# Patient Record
Sex: FEMALE | Race: WHITE | NOT HISPANIC OR LATINO | URBAN - METROPOLITAN AREA
[De-identification: names, ages, dates, MRNs, and addresses within clinical notes are randomized per-mention and may not be internally consistent; named-entity substitution may affect disease eponyms.]

---

## 2017-10-03 ENCOUNTER — EMERGENCY (EMERGENCY)
Facility: HOSPITAL | Age: 82
LOS: 0 days | Discharge: HOME | End: 2017-10-04

## 2017-10-03 DIAGNOSIS — Z79.899 OTHER LONG TERM (CURRENT) DRUG THERAPY: ICD-10-CM

## 2017-10-03 DIAGNOSIS — K59.00 CONSTIPATION, UNSPECIFIED: ICD-10-CM

## 2017-10-03 DIAGNOSIS — I10 ESSENTIAL (PRIMARY) HYPERTENSION: ICD-10-CM

## 2017-10-03 DIAGNOSIS — A41.9 SEPSIS, UNSPECIFIED ORGANISM: ICD-10-CM

## 2017-10-03 DIAGNOSIS — Z96.649 PRESENCE OF UNSPECIFIED ARTIFICIAL HIP JOINT: ICD-10-CM

## 2017-10-03 DIAGNOSIS — Z98.890 OTHER SPECIFIED POSTPROCEDURAL STATES: ICD-10-CM

## 2017-10-04 ENCOUNTER — EMERGENCY (EMERGENCY)
Facility: HOSPITAL | Age: 82
LOS: 0 days | Discharge: HOME | End: 2017-10-05

## 2017-10-04 DIAGNOSIS — A41.9 SEPSIS, UNSPECIFIED ORGANISM: ICD-10-CM

## 2017-10-04 DIAGNOSIS — R10.32 LEFT LOWER QUADRANT PAIN: ICD-10-CM

## 2017-10-04 DIAGNOSIS — I10 ESSENTIAL (PRIMARY) HYPERTENSION: ICD-10-CM

## 2017-10-04 DIAGNOSIS — K59.00 CONSTIPATION, UNSPECIFIED: ICD-10-CM

## 2017-10-04 DIAGNOSIS — Z79.899 OTHER LONG TERM (CURRENT) DRUG THERAPY: ICD-10-CM

## 2018-08-02 ENCOUNTER — APPOINTMENT (OUTPATIENT)
Dept: BURN CARE | Facility: CLINIC | Age: 83
End: 2018-08-02

## 2018-08-02 PROBLEM — Z00.00 ENCOUNTER FOR PREVENTIVE HEALTH EXAMINATION: Status: ACTIVE | Noted: 2018-08-02

## 2018-10-18 ENCOUNTER — APPOINTMENT (OUTPATIENT)
Dept: BURN CARE | Facility: CLINIC | Age: 83
End: 2018-10-18

## 2018-10-18 ENCOUNTER — OUTPATIENT (OUTPATIENT)
Dept: OUTPATIENT SERVICES | Facility: HOSPITAL | Age: 83
LOS: 1 days | Discharge: HOME | End: 2018-10-18

## 2018-10-18 DIAGNOSIS — S81.802A UNSPECIFIED OPEN WOUND, LEFT LOWER LEG, INITIAL ENCOUNTER: ICD-10-CM

## 2018-10-18 DIAGNOSIS — I10 ESSENTIAL (PRIMARY) HYPERTENSION: ICD-10-CM
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
## 2018-10-24 DIAGNOSIS — S81.802A UNSPECIFIED OPEN WOUND, LEFT LOWER LEG, INITIAL ENCOUNTER: ICD-10-CM
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
## 2018-11-01 ENCOUNTER — APPOINTMENT (OUTPATIENT)
Dept: WOUND CARE | Facility: CLINIC | Age: 83
End: 2018-11-01

## 2019-03-05 ENCOUNTER — INPATIENT (INPATIENT)
Facility: HOSPITAL | Age: 84
LOS: 8 days | Discharge: HOME HEALTH | End: 2019-03-14
Attending: INTERNAL MEDICINE | Admitting: INTERNAL MEDICINE
Payer: MEDICARE

## 2019-03-05 VITALS
RESPIRATION RATE: 20 BRPM | SYSTOLIC BLOOD PRESSURE: 177 MMHG | HEART RATE: 69 BPM | TEMPERATURE: 98 F | OXYGEN SATURATION: 98 % | DIASTOLIC BLOOD PRESSURE: 85 MMHG

## 2019-03-05 DIAGNOSIS — R09.89 OTHER SPECIFIED SYMPTOMS AND SIGNS INVOLVING THE CIRCULATORY AND RESPIRATORY SYSTEMS: ICD-10-CM

## 2019-03-05 LAB
ALBUMIN SERPL ELPH-MCNC: 3.9 G/DL — SIGNIFICANT CHANGE UP (ref 3.5–5.2)
ALP SERPL-CCNC: 116 U/L — HIGH (ref 30–115)
ALT FLD-CCNC: 8 U/L — SIGNIFICANT CHANGE UP (ref 0–41)
ANION GAP SERPL CALC-SCNC: 10 MMOL/L — SIGNIFICANT CHANGE UP (ref 7–14)
AST SERPL-CCNC: 18 U/L — SIGNIFICANT CHANGE UP (ref 0–41)
BASOPHILS # BLD AUTO: 0.02 K/UL — SIGNIFICANT CHANGE UP (ref 0–0.2)
BASOPHILS NFR BLD AUTO: 0.3 % — SIGNIFICANT CHANGE UP (ref 0–1)
BILIRUB SERPL-MCNC: 0.5 MG/DL — SIGNIFICANT CHANGE UP (ref 0.2–1.2)
BUN SERPL-MCNC: 11 MG/DL — SIGNIFICANT CHANGE UP (ref 10–20)
CALCIUM SERPL-MCNC: 9 MG/DL — SIGNIFICANT CHANGE UP (ref 8.5–10.1)
CHLORIDE SERPL-SCNC: 102 MMOL/L — SIGNIFICANT CHANGE UP (ref 98–110)
CO2 SERPL-SCNC: 26 MMOL/L — SIGNIFICANT CHANGE UP (ref 17–32)
CREAT SERPL-MCNC: 0.6 MG/DL — LOW (ref 0.7–1.5)
EOSINOPHIL # BLD AUTO: 0.16 K/UL — SIGNIFICANT CHANGE UP (ref 0–0.7)
EOSINOPHIL NFR BLD AUTO: 2.4 % — SIGNIFICANT CHANGE UP (ref 0–8)
GLUCOSE SERPL-MCNC: 91 MG/DL — SIGNIFICANT CHANGE UP (ref 70–99)
HCT VFR BLD CALC: 37.8 % — SIGNIFICANT CHANGE UP (ref 37–47)
HGB BLD-MCNC: 12.4 G/DL — SIGNIFICANT CHANGE UP (ref 12–16)
IMM GRANULOCYTES NFR BLD AUTO: 0.1 % — SIGNIFICANT CHANGE UP (ref 0.1–0.3)
LACTATE SERPL-SCNC: 1.1 MMOL/L — SIGNIFICANT CHANGE UP (ref 0.5–2.2)
LYMPHOCYTES # BLD AUTO: 1 K/UL — LOW (ref 1.2–3.4)
LYMPHOCYTES # BLD AUTO: 14.9 % — LOW (ref 20.5–51.1)
MCHC RBC-ENTMCNC: 30.4 PG — SIGNIFICANT CHANGE UP (ref 27–31)
MCHC RBC-ENTMCNC: 32.8 G/DL — SIGNIFICANT CHANGE UP (ref 32–37)
MCV RBC AUTO: 92.6 FL — SIGNIFICANT CHANGE UP (ref 81–99)
MONOCYTES # BLD AUTO: 0.78 K/UL — HIGH (ref 0.1–0.6)
MONOCYTES NFR BLD AUTO: 11.6 % — HIGH (ref 1.7–9.3)
NEUTROPHILS # BLD AUTO: 4.75 K/UL — SIGNIFICANT CHANGE UP (ref 1.4–6.5)
NEUTROPHILS NFR BLD AUTO: 70.7 % — SIGNIFICANT CHANGE UP (ref 42.2–75.2)
NRBC # BLD: 0 /100 WBCS — SIGNIFICANT CHANGE UP (ref 0–0)
PLATELET # BLD AUTO: 317 K/UL — SIGNIFICANT CHANGE UP (ref 130–400)
POTASSIUM SERPL-MCNC: 5 MMOL/L — SIGNIFICANT CHANGE UP (ref 3.5–5)
POTASSIUM SERPL-SCNC: 5 MMOL/L — SIGNIFICANT CHANGE UP (ref 3.5–5)
PROT SERPL-MCNC: 6.8 G/DL — SIGNIFICANT CHANGE UP (ref 6–8)
RBC # BLD: 4.08 M/UL — LOW (ref 4.2–5.4)
RBC # FLD: 14 % — SIGNIFICANT CHANGE UP (ref 11.5–14.5)
SODIUM SERPL-SCNC: 138 MMOL/L — SIGNIFICANT CHANGE UP (ref 135–146)
WBC # BLD: 6.72 K/UL — SIGNIFICANT CHANGE UP (ref 4.8–10.8)
WBC # FLD AUTO: 6.72 K/UL — SIGNIFICANT CHANGE UP (ref 4.8–10.8)

## 2019-03-05 PROCEDURE — 93970 EXTREMITY STUDY: CPT | Mod: 26

## 2019-03-05 PROCEDURE — 93925 LOWER EXTREMITY STUDY: CPT | Mod: 26

## 2019-03-05 RX ORDER — COLLAGENASE CLOSTRIDIUM HIST. 250 UNIT/G
1 OINTMENT (GRAM) TOPICAL
Qty: 0 | Refills: 0 | Status: DISCONTINUED | OUTPATIENT
Start: 2019-03-05 | End: 2019-03-12

## 2019-03-05 RX ORDER — LOSARTAN POTASSIUM 100 MG/1
25 TABLET, FILM COATED ORAL DAILY
Qty: 0 | Refills: 0 | Status: DISCONTINUED | OUTPATIENT
Start: 2019-03-05 | End: 2019-03-12

## 2019-03-05 RX ORDER — CHLORHEXIDINE GLUCONATE 213 G/1000ML
1 SOLUTION TOPICAL
Qty: 0 | Refills: 0 | Status: DISCONTINUED | OUTPATIENT
Start: 2019-03-05 | End: 2019-03-12

## 2019-03-05 RX ORDER — AMPICILLIN SODIUM AND SULBACTAM SODIUM 250; 125 MG/ML; MG/ML
3 INJECTION, POWDER, FOR SUSPENSION INTRAMUSCULAR; INTRAVENOUS ONCE
Qty: 0 | Refills: 0 | Status: DISCONTINUED | OUTPATIENT
Start: 2019-03-05 | End: 2019-03-05

## 2019-03-05 RX ORDER — FENTANYL CITRATE 50 UG/ML
1 INJECTION INTRAVENOUS
Qty: 0 | Refills: 0 | COMMUNITY

## 2019-03-05 RX ORDER — HEPARIN SODIUM 5000 [USP'U]/ML
5000 INJECTION INTRAVENOUS; SUBCUTANEOUS EVERY 8 HOURS
Qty: 0 | Refills: 0 | Status: DISCONTINUED | OUTPATIENT
Start: 2019-03-05 | End: 2019-03-12

## 2019-03-05 RX ORDER — VANCOMYCIN HCL 1 G
1000 VIAL (EA) INTRAVENOUS ONCE
Qty: 0 | Refills: 0 | Status: COMPLETED | OUTPATIENT
Start: 2019-03-05 | End: 2019-03-05

## 2019-03-05 RX ORDER — SODIUM CHLORIDE 9 MG/ML
1000 INJECTION INTRAMUSCULAR; INTRAVENOUS; SUBCUTANEOUS ONCE
Qty: 0 | Refills: 0 | Status: COMPLETED | OUTPATIENT
Start: 2019-03-05 | End: 2019-03-05

## 2019-03-05 RX ORDER — OLANZAPINE 15 MG/1
1 TABLET, FILM COATED ORAL
Qty: 0 | Refills: 0 | COMMUNITY

## 2019-03-05 RX ORDER — VALSARTAN 80 MG/1
1 TABLET ORAL
Qty: 0 | Refills: 0 | COMMUNITY

## 2019-03-05 RX ORDER — DIPHENHYDRAMINE HCL 50 MG
25 CAPSULE ORAL ONCE
Qty: 0 | Refills: 0 | Status: COMPLETED | OUTPATIENT
Start: 2019-03-05 | End: 2019-03-05

## 2019-03-05 RX ADMIN — SODIUM CHLORIDE 2000 MILLILITER(S): 9 INJECTION INTRAMUSCULAR; INTRAVENOUS; SUBCUTANEOUS at 15:08

## 2019-03-05 RX ADMIN — Medication 25 MILLIGRAM(S): at 18:55

## 2019-03-05 RX ADMIN — Medication 1 APPLICATION(S): at 17:07

## 2019-03-05 RX ADMIN — Medication 250 MILLIGRAM(S): at 14:52

## 2019-03-05 RX ADMIN — Medication 1 TABLET(S): at 16:16

## 2019-03-05 RX ADMIN — Medication 100 MILLIGRAM(S): at 21:35

## 2019-03-05 RX ADMIN — HEPARIN SODIUM 5000 UNIT(S): 5000 INJECTION INTRAVENOUS; SUBCUTANEOUS at 21:35

## 2019-03-05 NOTE — CONSULT NOTE ADULT - ASSESSMENT
pain sub 5th metatarsal due to plantarflexed 5th metatarsal  with pretrophic lesion.  pt unable to walk due to the pain.

## 2019-03-05 NOTE — ED PROVIDER NOTE - OBJECTIVE STATEMENT
84 yo f with pmh of htn sent in by podiatry for admission for left foot cellulitis.  no trauma, no injuries, no numbness, no weakness, no fevers, no chills, no cp, no sob.  pt with pain in the foot for 3 days.  pt went to podiatrist who debreeided foot and sent her in for admission for iv abx.  pt also has chronic left lower leg open wounds/ulcerations that she changes the dressing on daily

## 2019-03-05 NOTE — PATIENT PROFILE ADULT - NSPROMUTINFOINDIVIDFT_GEN_A_NUR
Verified Results  TSH 26Sep2018 08:15DEBORAH THEO STACIEFAVIONITO   [Sep 27, 2018 8:13AM THEO STACIEFAVIONITO]  Labs are okay     Test Name Result Flag Reference   TSH 2.073 mcUnits/mL  0.350-5.000     CBC WITHOUT DIFFERENTIAL 26Sep2018 08:15DEBORAH VENEGAS STACIERBAX   [Sep 27, 2018 8:13DEBORAH THEO STACIERBAX]  Labs are okay     Test Name Result Flag Reference   RDW-CV 13.3 %  11.0-15.0   PLATELET COUNT 219 K/mcL  140-450   WHITE BLOOD COUNT 9.8 K/mcL  4.2-11.0   RED CELL COUNT 4.35 mil/mcL L 4.50-5.90   HEMOGLOBIN 13.2 g/dl  13.0-17.0   HEMATOCRIT 41.1 %  39.0-51.0   MEAN CORPUSCULAR VOLUME 94.5 fL  78.0-100.0   MEAN CORPUSCULAR HEMOGLOBIN 30.3 pg  26.0-34.0   MEAN CORPUSCULAR HGB CONC 32.1 g/dl  32.0-36.5   NRBC 0 /100 WBC  0     COMP METABOLIC PNL 26Sep2018 08:15DEBORAH THEO [place]   [Sep 27, 2018 8:13AM THEO STACIEFAVIONITO]  Labs are okay     Test Name Result Flag Reference   SODIUM 144 mmol/L  135-145   POTASSIUM 4.3 mmol/L  3.4-5.1   CHLORIDE 111 mmol/L H    CARBON DIOXIDE 25 mmol/L  21-32   ANION GAP 12 mmol/L  10-20   GLUCOSE 103 mg/dl H 65-99   BUN 21 mg/dl H 6-20   CREATININE 1.16 mg/dl  0.67-1.17   GFR EST.AFRICAN AMER 75     eGFR 60 - 89 mL/min/1.73m2 = Mild decrease in kidney function.   GFR EST.NONAFRI AMER 65     eGFR 60 - 89 mL/min/1.73m2 = Mild decrease in kidney function.   BUN/CREATININE RATIO 18  7-25   BILIRUBIN TOTAL 0.3 mg/dl  0.2-1.0   GOT/AST 24 Units/L  <38   ALKALINE PHOSPHATASE 78 Units/L     ALBUMIN 4.2 g/dl  3.6-5.1   TOTAL PROTEIN 7.9 g/dl  6.4-8.2   GLOBULIN (CALCULATED) 3.7 g/dl  2.0-4.0   A/G RATIO 1.1  1.0-2.4   CALCIUM 9.1 mg/dl  8.4-10.2   GPT/ALT 37 Units/L  <79   FASTING STATUS 12 HRS hrs       LIPID PNL [id number] 08:15AM [place] VENEGAS   [Sep 27, 2018 8:13AM [place] VENEGAS]  Labs are okay     Test Name Result Flag Reference   FASTING STATUS FASTING hrs     CHOLESTEROL 180 mg/dl  <200   Desirable            <200  Borderline High      200 to 239  High                 >=240   HDL CHOLESTEROL 37 mg/dl L >39   Low             <40  Borderline Low 40 to 49  Near Optimal   50 to 59  Optimal        >=60   TRIGLYCERIDES 82 mg/dl  <150   Normal                   <150  Borderline High          150 to 199  High                     200 to 499  Very High                >=500   LDL CHOLESTEROL (CALCULATED) 127 mg/dl  <130   OPTIMAL               <100  NEAR OPTIMAL          100-129  BORDERLINE HIGH       130-159  HIGH                  160-189  VERY HIGH             >=190   NON-HDL CHOLESTEROL 143 mg/dl     Therapeutic Target:  CHD and risk equivalents <130  Multiple risk factors    <160  0 to 1 risk factors      <190   CHOLESTEROL/HDL RATIO 4.9 H <4.5        none

## 2019-03-05 NOTE — ED PROVIDER NOTE - NS ED ROS FT
Constitutional:  no fevers, no chills, no malaise  ENMT: No neck pain or stiffness  Cardiac:  No chest pain  Respiratory:  No cough or sob  GI:  No nausea, vomiting, diarrhea or abdominal pain.  MS:  see hpi  Neuro:  No headache, no dizziness  Skin:  see hpi  Except as documented in the HPI,  all other systems are negative

## 2019-03-05 NOTE — H&P ADULT - ATTENDING COMMENTS
Agree with resident's note, HPI, PE, assessment and plan.  Pt was seen and examined independently.     86 yo F with MHx of HTN, chronic venous stasis and Kaposi sarcome, has been on chemo (?Taxol) [id number], last dose 2 weeks ago, treatment at [facility] with dr. Friedman, sent to Ed by podiatrist, who she sees rare, with suspision for cellulitis. Pt states for last couple of days the pain in LLE gotten worse that prompted visit to podiatrist. Pt denies fever, chills, no discharge from wound, no worsening of swelling.     CONSTITUTIONAL: No weakness, fevers or chills  EYES/ENT: No visual changes;  No vertigo or throat pain   NECK: No pain or stiffness  RESPIRATORY: No cough, wheezing, hemoptysis; No shortness of breath  CARDIOVASCULAR: No chest pain or palpitations  GASTROINTESTINAL: No abdominal or epigastric pain. No nausea, vomiting, or hematemesis; No diarrhea or constipation. No melena or hematochezia.  GENITOURINARY: No dysuria, frequency or hematuria  NEUROLOGICAL: No numbness or weakness  SKIN: No itching, rashes     T(C): 36.2  HR: 65  BP: 158/69  RR: 18  SpO2: 98%      Physical exam:  NAD, well developed, well nourished, decreased hearing  HEENT: PERRL  NECK: supple, no JVD  RESP: CTA b/l, no crackles, rhonchi  CVS: S1S2, RRR  GI: abdomen soft NT, ND  Extremities: b/l LE swelling, LE chronic venous stasis skin changes, LLE wounds with no drainage, no new erythema, no warmth.  NEURO: AOx3, no focal deficit  H/L: no enlarged LN noted     LABS:                       11.0   4.31  )-----------( 268      ( 06 Mar 2019 06:38 )             33.8     03-06    137  |  101  |  10  ----------------------------<  113<H>  3.9   |  25  |  0.6<L>    Ca    8.5      06 Mar 2019 06:38  Mg     2.1     03-06    TPro  6.8  /  Alb  3.9  /  TBili  0.5  /  DBili  x   /  AST  18  /  ALT  8   /  AlkPhos  116<H>  03-05    US art: Bilateral peripheral arterial occlusive disease. Moderate to high-grade   stenosis in the left superficial femoral artery.    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 No cellulitis  - pain control with Fentanyl patch, pt not very compliant with it as per son  - pt does not want to continue treatment at [facility], can refer her to our heme/onc clinic  - no ABx    # High grade stenosis in the left superficial femoral artery.  - vascular eval    # HTN  - cont home meds    # Chronic anemia - OP w/u and treatment with PMD  DVT ppx    Pt can be discharged after vascular evaluation with OP f/u with oncologist.

## 2019-03-05 NOTE — H&P ADULT - NSHPLABSRESULTS_GEN_ALL_CORE
12.4   6.72  )-----------( 317      ( 05 Mar 2019 14:20 )             37.8     03-05  138  |  102  |  11  ----------------------------<  91  5.0   |  26  |  0.6<L>  Ca    9.0      05 Mar 2019 14:20  TPro  6.8  /  Alb  3.9  /  TBili  0.5  /  DBili  x   /  AST  18  /  ALT  8   /  AlkPhos  116<H>  03-05    Lactate Trend  03-05 @ 14:20 Lactate:1.1

## 2019-03-05 NOTE — ED PROVIDER NOTE - PHYSICAL EXAMINATION
CONSTITUTIONAL: Well-developed; well-nourished; in no acute distress, nontoxic appearing  SKIN: Chronic left lower leg open wounds/ulcerations with erythema, no discharge.  HEAD: Normocephalic; atraumatic.  EYES:  conjunctiva and sclera clear.  ENT: MMM, no nasal congestion  NECK: Supple; non tender.  ROM intact.  CARD: S1, S2 normal, no murmur  RESP: no resp distress, pt breathing comfortably  ABD: soft; non-distended; non-tender. No Rebound, No guarding  EXT: LLE: with erythema to sole of foot with localized area of worsening erytrhema, no discharge, + tenderness, no bleeding, motor/sensaiton intact; chornic open ulcerations to lower leg.  2+ dp pulse, no crepitus  NEURO: awake, alert, following commands, oriented, grossly unremarkable. No Focal deficits. GCS 15.   PSYCH: Cooperative, appropriate.

## 2019-03-05 NOTE — H&P ADULT - NSHPSOCIALHISTORY_GEN_ALL_CORE
Tobacco Usage:  Alcohol Usage:  Illicit Drug Usage: Tobacco Usage: Denies  Alcohol Usage: Denies  Illicit Drug Usage: Denies

## 2019-03-05 NOTE — CONSULT NOTE ADULT - SUBJECTIVE AND OBJECTIVE BOX
PODIATRY CONSULT   KALI ANDERS is a pleasant well-nourished, well developed 85y Female in no acute distress, alert awake, and oriented to person, place and time.   Patient is a 85y old  Female who presents with a chief complaint of Left foot pain and chronic wounds LLE.  HPI:  84 yo f with pmh of htn sent in by podiatry for admission for left foot cellulitis.  no trauma, no injuries, no numbness, no weakness, no fevers, no chills, nocp, no sob.  pt with pain in the foot for 3 days.  pt went to podiatrist who debreeided foot and sent her in for admission for iv abx.  pt also has chronic left lower leg open wounds/ulcerations that she changes the dressing on daily  Skin ulcer of left ankle, limited to breakdown of skin  Leg swelling  HTN (hypertension)      PMH: Skin ulcer of left ankle, limited to breakdown of skin  Leg swelling  HTN (hypertension)    PSH:   Medication ampicillin/sulbactam  IVPB 3 Gram(s) IV Intermittent once  vancomycin  IVPB 1000 milliGRAM(s) IV Intermittent once    Allergy: No Known Allergies        Labs:                    O:   Derm: Ulceration Right/Left ---- in nature, +/- hyperkeratotic border, wound base Granular/Fibrogranular/Necrotic patches/ , wound size (-- cm X – cm X –cm) +/- edema, +/- chuy-wound erythema, +/- purulence, +/- fluctuance, +/- tracking/tunneling, +/- probe to bone. +/- streaking erythema. +/- xerosis, +/- hemosiderin deposits. +/- active sign of infection  Vascular: Dorsalis Pedis and Posterior Tibial pulses --/4.  Capillary re-fill time less then 3 seconds digits 1-5 bilateral.  B/L feet warm to touch  Neuro: Protective sensation intact/diminished to the level of the digits bilateral.  MSK: Muscle strength 5/5 all major muscle groups bilateral.        Assessment and Plan:   Pt with…………………..  Chart reviewed and Patient evaluated  Discussed diagnosis and treatment with patient  Wound flush with normal saline  Applied --- with dry sterile dressing  Offloading to bilateral Heels.   Obtained wound culture to be sent to Pathology  X-rays ordered/reviewed : Shows -------  Recommend ID/Vascular consult  Continue IV abx as per ID  Arterial duplex ordered  Weight bearing/Non-weight bearing  to ------------  Discussed importance of daily foot examinations and proper shoe gear and to importance of lower Fasting Blood Glucose levels.   Podiatry will follow while in house.   will discuss care plan  with all  Attendings ------  Pt to OR for Excisional debridement  on non viable soft  tissue  ---- base down to subcutaneous tissue red granular base  Right/ Left foot ulceration  Medical clearance requested PODIATRY CONSULT   KALI ANDERS is  a 85y old  Female who presents with a chief complaint of Left foot pain and chronic wounds LLE.  HPI:  84 yo f with pmh of htn sent in by podiatry for admission for left foot cellulitis.  no trauma, no injuries, no numbness, no weakness, no fevers, no chills, nocp, no sob.  pt with pain in the foot for 3 days.  pt went to podiatrist who debreeided foot and sent her in for admission for iv abx.  pt also has chronic left lower leg open wounds/ulcerations that she changes the dressing on daily  Skin ulcer of left ankle, limited to breakdown of skin  Leg swelling  HTN (hypertension)      PMH: Skin ulcer of left ankle, limited to breakdown of skin  Leg swelling  HTN (hypertension)    PSH:   Medication ampicillin/sulbactam  IVPB 3 Gram(s) IV Intermittent once  vancomycin  IVPB 1000 milliGRAM(s) IV Intermittent once    Allergy: No Known Allergies        Labs:                    O: -   - Derm: Ulceration Left LE, medial lower leg, venus stasis in nature, wound base Fibrogranular, wound size (22.3 cm X 15.2 x 0.5 cm), sharp well demarcated with rolled borders. + edema, + chuy-wound erythema, - purulence, - probe to bone. - streaking erythema. + hemosiderin deposits.  -  Ulceration Left LE, lateral lower leg, venus stasis in nature, wound base Fibrogranular, wound size (3.8  cm X  2.8 x 0.5 cm), sharp well demarcated with rolled borders. + edema, + chuy-wound erythema, - purulence, - probe to bone. - streaking erythema. + hemosiderin deposits.  - Edema, erythema, and warmth of the LLE   Vascular: Dorsalis Pedis and Posterior Tibial pulses diminished.  Capillary re-fill time less then 3 seconds digits 1-5 bilateral.  B/L feet warm to touch  Neuro: Protective sensation intact to the level of the digits bilateral.  MSK: Muscle strength 5/5 all major muscle groups bilateral.         Assessment:   Venus stasis ulcer LLE  P:  Chart reviewed and Patient evaluated  Discussed diagnosis and treatment with patient  Wound flush with normal saline/peroxide  Applied Xeroform with dry sterile dressing and ACE bandage with compression to pt tolerance   Recommend ID consult   will discuss care plan  with  Attending PODIATRY CONSULT   KALI ANDERS is  a 85y old  Female who presents with a chief complaint of Left foot pain and chronic wounds LLE.  HPI:  86 yo f with pmh of htn sent in by podiatry for admission for left foot cellulitis.  no trauma, no injuries, no numbness, no weakness, no fevers, no chills, nocp, no sob.  pt with pain in the foot for 3 days.  pt went to podiatrist who debreeided foot and sent her in for admission for iv abx.  pt also has chronic left lower leg open wounds/ulcerations that she changes the dressing on daily  Skin ulcer of left ankle, limited to breakdown of skin  Leg swelling  HTN (hypertension)      PMH: Skin ulcer of left ankle, limited to breakdown of skin  Leg swelling  HTN (hypertension)    PSH:   Medication ampicillin/sulbactam  IVPB 3 Gram(s) IV Intermittent once  vancomycin  IVPB 1000 milliGRAM(s) IV Intermittent once    Allergy: No Known Allergies        Labs:                    O: -   - Derm: Ulceration Left LE, medial lower leg, venus stasis in nature, wound base Fibrogranular, wound size (22.3 cm X 15.2 x 0.5 cm), sharp well demarcated with rolled borders. + edema, + chuy-wound erythema, - purulence, - probe to bone. - streaking erythema. + hemosiderin deposits.  -  Ulceration Left LE, lateral lower leg, venus stasis in nature, wound base Fibrogranular, wound size (3.8  cm X  2.8 x 0.5 cm), sharp well demarcated with rolled borders. + edema, + chuy-wound erythema, - purulence, - probe to bone. - streaking erythema. + hemosiderin deposits.  - Edema, erythema, and warmth of the LLE   Vascular: Dorsalis Pedis and Posterior Tibial pulses diminished.  Capillary re-fill time less then 3 seconds digits 1-5 bilateral.  B/L feet warm to touch  Neuro: Protective sensation intact to the level of the digits bilateral.  MSK: Muscle strength 5/5 all major muscle groups bilateral.   pain on palpation left 5th met head.      Assessment:   Venus stasis ulcer LLE  cellulitis LLE  left 5th met head pain  P:  Chart reviewed and Patient evaluated  Discussed diagnosis and treatment with patient  Wound flush with normal saline/peroxide  Applied Xeroform with dry sterile dressing and ACE bandage with compression to pt tolerance   Recommend ID consult  ordered left foot xray  ordered arterial and venous duplex, consult vascular if abnormal  will discuss care plan  with  Attending PODIATRY CONSULT   KALI ANDERS is  a 85y old  Female who presents with a chief complaint of Left foot pain and chronic wounds LLE.  HPI:  86 yo f with pmh of htn sent in by podiatry for admission for left foot cellulitis.  no trauma, no injuries, no numbness, no weakness, no fevers, no chills, nocp, no sob.  pt with pain in the foot for 3 days.  pt went to podiatrist who debreeided foot and sent her in for admission for iv abx.  pt also has chronic left lower leg open wounds/ulcerations that she changes the dressing on daily  Skin ulcer of left ankle, limited to breakdown of skin  Leg swelling  HTN (hypertension)      PMH: Skin ulcer of left ankle, limited to breakdown of skin  Leg swelling  HTN (hypertension)    PSH:   Medication ampicillin/sulbactam  IVPB 3 Gram(s) IV Intermittent once  vancomycin  IVPB 1000 milliGRAM(s) IV Intermittent once    Allergy: No Known Allergies        Labs:                    O: -   - Derm: Ulceration Left LE, medial lower leg, venus stasis in nature, wound base Fibrogranular, wound size (22.3 cm X 15.2 x 0.5 cm), sharp well demarcated with rolled borders. + edema, + chuy-wound erythema, - purulence, - probe to bone. - streaking erythema. + hemosiderin deposits.  -  Ulceration Left LE, lateral lower leg, venus stasis in nature, wound base Fibrogranular, wound size (3.8  cm X  2.8 x 0.5 cm), sharp well demarcated with rolled borders. + edema, + chuy-wound erythema, - purulence, - probe to bone. - streaking erythema. + hemosiderin deposits.  - Ulcer left sub met 5, limited to the dermis. 0.3x0.2x0.1cm size, stable.   - Edema, erythema, and warmth of the LLE   Vascular: Dorsalis Pedis and Posterior Tibial pulses diminished.  Capillary re-fill time less then 3 seconds digits 1-5 bilateral.  B/L feet warm to touch  Neuro: Protective sensation intact to the level of the digits bilateral.  MSK: Muscle strength 5/5 all major muscle groups bilateral.   pain on palpation left 5th met head.      Assessment:   Venus stasis ulcer LLE  cellulitis LLE  Small left 5th sub met ulcer, limited to dermis, stable  P:  Chart reviewed and Patient evaluated  Discussed diagnosis and treatment with patient  Wound flush with normal saline/peroxide  Applied Xeroform with dry sterile dressing and ACE bandage with compression to pt tolerance   Recommend ID consult  IV ABx as per ID recommendations  ordered left foot xray  ordered arterial and venous duplex, consult vascular if abnormal  will discuss care plan  with  Attending

## 2019-03-05 NOTE — ED ADULT NURSE NOTE - OBJECTIVE STATEMENT
pt with c/o left foot lac, left leg swelling and pressure ulcer ( stage 2 in appearance, yellow and scaley in appearance)

## 2019-03-05 NOTE — H&P ADULT - ASSESSMENT
86 yo F  PMH: HTN  cc: sent in by podiatrist for the evaluation of cellulitis.   History: Patient has been having right foot pain x 3 days. Sent to the hospital by her podiatrist for IV antibiotics.   In ED:   Medications ordered: Patient was given Augmentin 875/125mg x 1 dose, Vancomycin 1g IV x 1 dose, NS 1 L bolus. Santyl ordered  Imaging studies ordered: Venous Duplex, Arterial Duplex, X-ray of foot AP/Lateral/Olique    ASSESSMENT:  1.	HTN  2.	Left Lower Extremity Cellulitis with Venous Stasis Ulcer    PLAN:  ·	Admit to medicine  ·	Podiatry following  ·	Dressings changed - Continue Santyl  ·	Consider ID consult  ·	Left Foot X-ray completed, f/u read  ·	Venous Duplex performed, order pending  ·	Arterial Duplex for PAD evaluation pending. If abnormal will need vascular consult.  ·	Continue home medications  ·	CHG daily and prn for incontinence  ·	Carbohydrate consistent / DASH diet  ·	DVT ppx with Heparin sc  ·	Full Code 86 yo F  PMH: HTN  cc: sent in by podiatrist for the evaluation of cellulitis.   History: Patient has been having right foot pain x 3 days. Sent to the hospital by her podiatrist for IV antibiotics.   In ED:   Medications ordered: Patient was given Augmentin 875/125mg x 1 dose, Vancomycin 1g IV x 1 dose, NS 1 L bolus. Santyl ordered  Imaging studies ordered: Venous Duplex, Arterial Duplex, X-ray of foot AP/Lateral/Olique    ASSESSMENT:  1.	HTN  2.	Left Lower Extremity Cellulitis with Venous Stasis Ulcer    PLAN:  ·	Admit to medicine  ·	Podiatry following  ·	Dressings changed - Continue Santyl  ·	ID consulted, f/u recommendations  ·	Blood Cultures x 2 pending  ·	Continue IV antibiotics: Clindamycin 600mg  IV q8h  ·	Left Foot X-ray completed, f/u read  ·	Venous Duplex performed, order pending  ·	Arterial Duplex for PAD evaluation pending. If abnormal will need vascular consult.  ·	Continue home medications  ·	CHG daily and prn for incontinence  ·	Carbohydrate consistent / DASH diet  ·	DVT ppx with Heparin sc  ·	Full Code 86 yo F  PMH: HTN  cc: sent in by podiatrist for the evaluation of cellulitis.   History: Patient has been having right foot pain x 3 days. Sent to the hospital by her podiatrist for IV antibiotics.   In ED:   Medications ordered: Patient was given Augmentin 875/125mg x 1 dose, Vancomycin 1g IV x 1 dose, NS 1 L bolus. Santyl ordered  Imaging studies ordered: Venous Duplex, Arterial Duplex, X-ray of foot AP/Lateral/Olique    ASSESSMENT:  1.	HTN  2.	Left Lower Extremity Cellulitis with Venous Stasis Ulcer    PLAN:  ·	Admit to medicine  ·	Podiatry following  ·	Dressings changed - Continue Santyl  ·	ID consulted, f/u recommendations  ·	Blood Cultures x 2 pending  ·	Continue IV antibiotics: Clindamycin 600mg  IV q8h  ·	Left Foot X-ray completed, f/u read  ·	Venous Duplex performed, order pending  ·	Arterial Duplex for PAD evaluation pending. If abnormal will need vascular consult.  ·	Valsartan not available as formulary. Will begin Losartan 25mg PO once a day. Increase as needed.  ·	CHG daily and prn for incontinence  ·	Carbohydrate consistent / DASH diet  ·	DVT ppx with Heparin sc  ·	Full Code

## 2019-03-05 NOTE — H&P ADULT - HISTORY OF PRESENT ILLNESS
86 yo F  PMH: HTN  cc: sent in by podiatrist for the evaluation of cellulitis.   History: Patient has been having right foot pain x 3 days. She went to her podiatrist and had a debridement done. She was then instructed to come to the hospital for IV antibiotics.   ROS: 86 yo F  PMH: HTN  cc: sent in by podiatrist for the evaluation of cellulitis.   History: Patient states that history began three years ago. She was taking the trash outside while it was raining and fell down 15 steps injuring both her legs. The right lower extremity has healed well however the left lower extremity has not. She has been following with a podiatrist for chronic ulcers she has developed. Patient has been having right foot pain x 3 days. She said that last night the pain became very severe. She tried to take two Tylenol which usually helps to alleviate the pain however, this time it did not improve. She went to her podiatrist in AM and had a debridement done. She was then instructed to come to the hospital for IV antibiotics and to r/o cellulitis. Patient denies f/n/v/d/c, chills, headache, dizziness, chest pain, sob, abdominal pain, back pain. Admits to severe sharp pain when attempting to ambulate.

## 2019-03-05 NOTE — ED PROVIDER NOTE - PROGRESS NOTE DETAILS
a/p:  cellulitis.  pt sent in by podiatery for admission to medicine.  I spoke to podiatry who will come to see pt.  will do labs, xray, podiatry consult, abx, admission

## 2019-03-05 NOTE — ED ADULT NURSE NOTE - NSIMPLEMENTINTERV_GEN_ALL_ED
Implemented All Fall with Harm Risk Interventions:  [place] to call system. Call bell, personal items and telephone within reach. Instruct patient to call for assistance. Room bathroom lighting operational. Non-slip footwear when patient is off stretcher. Physically safe environment: no spills, clutter or unnecessary equipment. Stretcher in lowest position, wheels locked, appropriate side rails in place. Provide visual cue, wrist band, yellow gown, etc. Monitor gait and stability. Monitor for mental status changes and reorient to person, place, and time. Review medications for side effects contributing to fall risk. Reinforce activity limits and safety measures with patient and family. Provide visual clues: red socks.

## 2019-03-05 NOTE — ED PROVIDER NOTE - CLINICAL SUMMARY MEDICAL DECISION MAKING FREE TEXT BOX
Pt sent in by podiatrist for admission for cellulitis to the foot.  pt given iv abx.  xray done.  podiatry consulted.  pt admitted to medicine for further management.  pt aware of plan.

## 2019-03-05 NOTE — H&P ADULT - RS GEN PE MLT RESP DETAILS PC
respirations non-labored/no wheezes/no rhonchi/no rales/breath sounds equal/good air movement/airway patent

## 2019-03-06 ENCOUNTER — TRANSCRIPTION ENCOUNTER (OUTPATIENT)
Age: 84
End: 2019-03-06

## 2019-03-06 LAB
ANION GAP SERPL CALC-SCNC: 11 MMOL/L — SIGNIFICANT CHANGE UP (ref 7–14)
BUN SERPL-MCNC: 10 MG/DL — SIGNIFICANT CHANGE UP (ref 10–20)
CALCIUM SERPL-MCNC: 8.5 MG/DL — SIGNIFICANT CHANGE UP (ref 8.5–10.1)
CHLORIDE SERPL-SCNC: 101 MMOL/L — SIGNIFICANT CHANGE UP (ref 98–110)
CO2 SERPL-SCNC: 25 MMOL/L — SIGNIFICANT CHANGE UP (ref 17–32)
CREAT SERPL-MCNC: 0.6 MG/DL — LOW (ref 0.7–1.5)
CRP SERPL-MCNC: 5.22 MG/DL — HIGH (ref 0–0.4)
ERYTHROCYTE [SEDIMENTATION RATE] IN BLOOD: 57 MM/HR — HIGH (ref 0–20)
GLUCOSE SERPL-MCNC: 113 MG/DL — HIGH (ref 70–99)
HCT VFR BLD CALC: 33.8 % — LOW (ref 37–47)
HGB BLD-MCNC: 11 G/DL — LOW (ref 12–16)
MAGNESIUM SERPL-MCNC: 2.1 MG/DL — SIGNIFICANT CHANGE UP (ref 1.8–2.4)
MCHC RBC-ENTMCNC: 29.9 PG — SIGNIFICANT CHANGE UP (ref 27–31)
MCHC RBC-ENTMCNC: 32.5 G/DL — SIGNIFICANT CHANGE UP (ref 32–37)
MCV RBC AUTO: 91.8 FL — SIGNIFICANT CHANGE UP (ref 81–99)
NRBC # BLD: 0 /100 WBCS — SIGNIFICANT CHANGE UP (ref 0–0)
PLATELET # BLD AUTO: 268 K/UL — SIGNIFICANT CHANGE UP (ref 130–400)
POTASSIUM SERPL-MCNC: 3.9 MMOL/L — SIGNIFICANT CHANGE UP (ref 3.5–5)
POTASSIUM SERPL-SCNC: 3.9 MMOL/L — SIGNIFICANT CHANGE UP (ref 3.5–5)
RBC # BLD: 3.68 M/UL — LOW (ref 4.2–5.4)
RBC # FLD: 14 % — SIGNIFICANT CHANGE UP (ref 11.5–14.5)
SODIUM SERPL-SCNC: 137 MMOL/L — SIGNIFICANT CHANGE UP (ref 135–146)
WBC # BLD: 4.31 K/UL — LOW (ref 4.8–10.8)
WBC # FLD AUTO: 4.31 K/UL — LOW (ref 4.8–10.8)

## 2019-03-06 RX ORDER — FENTANYL CITRATE 50 UG/ML
1 INJECTION INTRAVENOUS
Qty: 0 | Refills: 0 | Status: DISCONTINUED | OUTPATIENT
Start: 2019-03-06 | End: 2019-03-06

## 2019-03-06 RX ORDER — FENTANYL CITRATE 50 UG/ML
1 INJECTION INTRAVENOUS
Qty: 0 | Refills: 0 | Status: DISCONTINUED | OUTPATIENT
Start: 2019-03-06 | End: 2019-03-12

## 2019-03-06 RX ADMIN — FENTANYL CITRATE 1 PATCH: 50 INJECTION INTRAVENOUS at 20:01

## 2019-03-06 RX ADMIN — HEPARIN SODIUM 5000 UNIT(S): 5000 INJECTION INTRAVENOUS; SUBCUTANEOUS at 22:03

## 2019-03-06 RX ADMIN — Medication 100 MILLIGRAM(S): at 05:08

## 2019-03-06 RX ADMIN — Medication 1 APPLICATION(S): at 05:09

## 2019-03-06 RX ADMIN — Medication 1 APPLICATION(S): at 18:07

## 2019-03-06 RX ADMIN — LOSARTAN POTASSIUM 25 MILLIGRAM(S): 100 TABLET, FILM COATED ORAL at 05:07

## 2019-03-06 RX ADMIN — FENTANYL CITRATE 1 PATCH: 50 INJECTION INTRAVENOUS at 18:07

## 2019-03-06 RX ADMIN — HEPARIN SODIUM 5000 UNIT(S): 5000 INJECTION INTRAVENOUS; SUBCUTANEOUS at 05:08

## 2019-03-06 RX ADMIN — HEPARIN SODIUM 5000 UNIT(S): 5000 INJECTION INTRAVENOUS; SUBCUTANEOUS at 13:25

## 2019-03-06 NOTE — CONSULT NOTE ADULT - ASSESSMENT
84 yo f with pmh of htn sent in by podiatry for admission for left foot cellulitis and non-healing open wound. Art dplx high grade stenosis SFA on the left side.  Vascular surgery called to evaluate and manage PVD     - pt will need an angiogram   - will d/w Dr. Crenshaw the timing  - May be as outpt    SPECTRA [id number] 84 yo f with pmh of htn sent in by podiatry for admission for left foot cellulitis and non-healing open wound. Art dplx high grade stenosis SFA on the left side.  Vascular surgery called to evaluate and manage PVD     - pt will need an angiogram     - LLE angiogram is scheduled for Tuesday     - case discussed with Dr. Crenshaw    Pt and family is aware    SPECTRA [id number]

## 2019-03-06 NOTE — DISCHARGE NOTE ADULT - CARE PLAN
Principal Discharge DX:	Skin ulcer of left ankle, limited to breakdown of skin  Goal:	Continued treatment and monitoring  Assessment and plan of treatment:	Arterial duplex was performed and demonstrated moderate to severe stenosis in left superficial femoral artery, no Deep vein thrombosis was identified. Vascular surgery team is planning for Angiogram as outpatient. Please follow up with vascular surgery within one week of discharge. Please also follow up with primary medical doctor and podiatry within one week of discharge. Principal Discharge DX:	Skin ulcer of left ankle, limited to breakdown of skin  Goal:	Continued treatment and monitoring  Assessment and plan of treatment:	Arterial duplex was performed and demonstrated moderate to severe stenosis in left superficial femoral artery, no Deep vein thrombosis was identified. Vascular surgery team completed left lower extremity angio of the superficial femoral artery which showed good peripheral flow.   You can follow up with the vascular surgeon in 1-2 weeks outpatient.   Wound care services have been set up to help with dressing changes on your lower extremities  Wound care: Wound flush with normal saline/peroxide  Apply Santyl, Xeroform with dry sterile dressind, and ACE bandage every day with compression to pt tolerance  Secondary Diagnosis:	Kaposi sarcoma  Goal:	continue medical management  Assessment and plan of treatment:	Please continue to see Dr. Chance regarding treatment for your Kaposi's sarcoma.     If you are interested in changes providers, as mentioned on your hospital stay, the number for another doctor at [facility] is provided below

## 2019-03-06 NOTE — PROGRESS NOTE ADULT - ASSESSMENT
KALI ANDERS 85y Female  MRN#: [id number]       SUBJECTIVE  Patient is a 85y old Female who presents with a chief complaint of Currently admitted to medicine with the primary diagnosis of left lower extremity swelling    Today is hospital day 1d, no acute events overnight. Reporting pain with dressing change this morning, but was comfortable now. Described having worsening pain for the last 2-3 days associated with the swelling that was preventing her from walking.     Denies SOB, CP, abdominal pain Tolerating PO diet without n/v. Regular BMs, no dysuria or increased urinary frequency.   OBJECTIVE  Home Medications:  valsartan 40 mg oral tablet: 40 milligram(s) orally once a day (05 Mar 2019 17:50)    MEDICATIONS:  STANDING MEDICATIONS  chlorhexidine 4% Liquid 1 Application(s) Topical <User Schedule>  collagenase Ointment 1 Application(s) Topical two times a day  heparin  Injectable 5000 Unit(s) SubCutaneous every 8 hours  losartan 25 milliGRAM(s) Oral daily    PRN MEDICATIONS      VITAL SIGNS: Last 24 Hours  T(C): 36.2 (06 Mar 2019 05:13), Max: 37.1 (05 Mar 2019 15:57)  T(F): 97.2 (06 Mar 2019 05:13), Max: 98.7 (05 Mar 2019 15:57)  HR: 65 (06 Mar 2019 05:13) (65 - 74)  BP: 158/69 (06 Mar 2019 05:13) (155/67 - 171/84)  BP(mean): --  RR: 18 (06 Mar 2019 05:13) (18 - 18)  SpO2: 98% (05 Mar 2019 20:30) (98% - 99%)    LABS:                        11.0   4.31  )-----------( 268      ( 06 Mar 2019 06:38 )             33.8     03-06    137  |  101  |  10  ----------------------------<  113<H>  3.9   |  25  |  0.6<L>    Ca    8.5      06 Mar 2019 06:38  Mg     2.1     03-06    TPro  6.8  /  Alb  3.9  /  TBili  0.5  /  DBili  x   /  AST  18  /  ALT  8   /  AlkPhos  116<H>  03-05    LIVER FUNCTIONS - ( 05 Mar 2019 14:20 )  Alb: 3.9 g/dL / Pro: 6.8 g/dL / ALK PHOS: 116 U/L / ALT: 8 U/L / AST: 18 U/L / GGT: x                 Sedimentation Rate, Erythrocyte: 57 mm/hr <H> (03-06-19 @ 06:38)  Lactate, Blood: 1.1 mmol/L (03-05-19 @ 14:20)          RADIOLOGY:  < from: Xray Foot AP + Lateral + Oblique, Left (03.05.19 @ 15:14) >  There is no evidence of acute fracture or dislocation. Diffuse   osteopenia. Scattered degenerative changes. Mild soft tissue swelling of   the foot, without any radiopaque foreign body.    Impression: No acute fracture or dislocation.      < end of copied text >    < from: VA Duplex Lower Ext Vein Scan, Bilat (03.05.19 @ 15:56) >  Impression:    No evidence of deep venous thrombosis or superficial thrombophlebitis in   the bilateral lower extremities.    < end of copied text >    < from: VA Duplex Lower Extrem Arterial, Bilat (03.05.19 @ 15:57) >  Impression:    Bilateral peripheral arterial occlusive disease. Moderate to high-grade   stenosis in the left superficial femoral artery.    < end of copied text >    PHYSICAL EXAM:    GENERAL: NAD, well-developed, AAOx3  HEENT:  Atraumatic, Normocephalic. EOMI, PERRLA no JVD  PULMONARY: Clear to auscultation bilaterally; No wheeze  CARDIOVASCULAR: Regular rate and rhythm; + holosystolic murmur  GASTROINTESTINAL: Soft, Nontender, Nondistended; Bowel sounds present  MUSCULOSKELETAL:  legs wrapped in b/l ace bandages from ankle to thigh (exam limited given that patient had dressing changed this morning and explained they were very painful). Based on previous pictures:  has large left medial ankle extending to posterior heel. base is clean granulating tissue, rolled borders without erythema, no pus.   NEUROLOGY: non-focal  SKIN: No rashes or lesions      ADMISSION SUMMARY  Patient is a 85y old Female with history of HTN and kaposi sarcoma, admitted to the hospital for possible     ASSESSMENT & PLAN      #Left Lower Extremity Venous Stasis Ulcer  - Patient has history of kaposi sarcoma getting chemotherapy treatments at [facility], will try to reach out to [facility] for further information   -  Arterial duplex positive for moderate to severe stenosis in left superficial femoral artery, no DVT  - Vascular consulted for further management of stenosis   -ID following: no antibiotics needed at this time  - FU Blood cultures  - Dressing changes as per podiatry- cw with [place]    #HTN  cw losartan   (takes valsartan at home)    #Diet: DASH diet  #DVT ppx: heparin subq  # GI ppx: not indicated  #Dispo: from home, can likely go back home when medically stable  -

## 2019-03-06 NOTE — DISCHARGE NOTE ADULT - MEDICATION SUMMARY - MEDICATIONS TO TAKE
I will START or STAY ON the medications listed below when I get home from the hospital:    fentaNYL 12 mcg/hr transdermal film, extended release  -- 1 patch by transdermal patch every 72 hours, As Needed for pain  -- Indication: For pain    aspirin 81 mg oral delayed release tablet  -- 1 tab(s) by mouth once a day  -- Indication: For peripheral artery disease    valsartan 40 mg oral tablet  -- 40 milligram(s) by mouth once a day  -- Indication: For HTN (hypertension)    gabapentin 300 mg oral capsule  -- 1 cap(s) by mouth once a day (at bedtime)  -- Indication: For pain    simvastatin 20 mg oral tablet  -- 1 tab(s) by mouth once a day (at bedtime)  -- Indication: For peripheral artery disease    OLANZapine 2.5 mg oral tablet  -- 1 tab(s) by mouth 2 times a day  -- Indication: For dementia    collagenase 250 units/g topical ointment  -- Apply on skin to affected area once a day   -- Indication: For wound care

## 2019-03-06 NOTE — DISCHARGE NOTE ADULT - PLAN OF CARE
Continued treatment and monitoring Arterial duplex was performed and demonstrated moderate to severe stenosis in left superficial femoral artery, no Deep vein thrombosis was identified. Vascular surgery team is planning for Angiogram as outpatient. Please follow up with vascular surgery within one week of discharge. Please also follow up with primary medical doctor and podiatry within one week of discharge. Arterial duplex was performed and demonstrated moderate to severe stenosis in left superficial femoral artery, no Deep vein thrombosis was identified. Vascular surgery team completed left lower extremity angio of the superficial femoral artery which showed good peripheral flow.   You can follow up with the vascular surgeon in 1-2 weeks outpatient.   Wound care services have been set up to help with dressing changes on your lower extremities  Wound care: Wound flush with normal saline/peroxide  Apply Santyl, Xeroform with dry sterile dressind, and ACE bandage every day with compression to pt tolerance continue medical management Please continue to see Dr. Chance regarding treatment for your Kaposi's sarcoma.     If you are interested in changes providers, as mentioned on your hospital stay, the number for another doctor at [facility] is provided below

## 2019-03-06 NOTE — CONSULT NOTE ADULT - SUBJECTIVE AND OBJECTIVE BOX
KALI ANDERS  85y, Female  Allergy: vancomycin (Flushing)      HPI:  84 yo F  PMH: HTN  cc: sent in by podiatrist for the evaluation of cellulitis.   History: Patient states that history began three years ago. She was taking the trash outside while it was raining and fell down 15 steps injuring both her legs. The right lower extremity has healed well however the left lower extremity has not. She has been following with a podiatrist for chronic ulcers she has developed. Patient has been having right foot pain x 3 days. She said that last night the pain became very severe. She tried to take two Tylenol which usually helps to alleviate the pain however, this time it did not improve. She went to her podiatrist in AM and had a debridement done. She was then instructed to come to the hospital for IV antibiotics and to r/o cellulitis. Patient denies f/n/v/d/c, chills, headache, dizziness, chest pain, sob, abdominal pain, back pain. Admits to severe sharp pain when attempting to ambulate. (05 Mar 2019 16:27)    FAMILY HISTORY:    PAST MEDICAL & SURGICAL HISTORY:  Skin ulcer of left ankle, limited to breakdown of skin  Leg swelling  HTN (hypertension)        VITALS:  T(F): 97.2, Max: 98.7 (03-05-19 @ 15:57)  HR: 65  BP: 158/69  RR: 18Vital Signs Last 24 Hrs  T(C): 36.2 (06 Mar 2019 05:13), Max: 37.1 (05 Mar 2019 15:57)  T(F): 97.2 (06 Mar 2019 05:13), Max: 98.7 (05 Mar 2019 15:57)  HR: 65 (06 Mar 2019 05:13) (65 - 74)  BP: 158/69 (06 Mar 2019 05:13) (155/67 - 177/85)  BP(mean): --  RR: 18 (06 Mar 2019 05:13) (18 - 20)  SpO2: 98% (05 Mar 2019 20:30) (98% - 99%)    TESTS & MEASUREMENTS:                        11.0   4.31  )-----------( 268      ( 06 Mar 2019 06:38 )             33.8     03-05    138  |  102  |  11  ----------------------------<  91  5.0   |  26  |  0.6<L>    Ca    9.0      05 Mar 2019 14:20    TPro  6.8  /  Alb  3.9  /  TBili  0.5  /  DBili  x   /  AST  18  /  ALT  8   /  AlkPhos  116<H>  03-05    LIVER FUNCTIONS - ( 05 Mar 2019 14:20 )  Alb: 3.9 g/dL / Pro: 6.8 g/dL / ALK PHOS: 116 U/L / ALT: 8 U/L / AST: 18 U/L / GGT: x                   RADIOLOGY & ADDITIONAL TESTS:    ANTIBIOTICS:  clindamycin IVPB 600 milliGRAM(s) IV Intermittent every 8 hours

## 2019-03-06 NOTE — DISCHARGE NOTE ADULT - CARE PROVIDER_API CALL
Wilian Crenshaw)  Surgery; Vascular Surgery  [address] [place] [address], [address] [address]  Phone: [phone]  Fax: [phone]  Follow Up Time:     Adolph Coleman (DPM)  Podiatric Medicine and Surgery  [address] [place] [address]  Phone: [phone]  Fax: [phone]  Follow Up Time:

## 2019-03-06 NOTE — CONSULT NOTE ADULT - EXTREMITIES COMMENTS
LLE with edema. Large ulcers mostly distal medially, also laterally, good granular base, chronic surrounding hyperpigmentation. Faint pedal ulses

## 2019-03-06 NOTE — CONSULT NOTE ADULT - SUBJECTIVE AND OBJECTIVE BOX
VASCULAR SURGERY CONSULT NOTE      HPI:  84 yo F  PMH: HTN  cc: sent in by podiatrist for the evaluation of cellulitis.   History: Patient states that history began three years ago. She was taking the trash outside while it was raining and fell down 15 steps injuring both her legs. The right lower extremity has healed well however the left lower extremity has not. She has been following with a podiatrist for chronic ulcers she has developed. Patient has been having right foot pain x 3 days. She said that last night the pain became very severe. She tried to take two Tylenol which usually helps to alleviate the pain however, this time it did not improve. She went to her podiatrist in AM and had a debridement done. She was then instructed to come to the hospital for IV antibiotics and to r/o cellulitis. Patient denies f/n/v/d/c, chills, headache, dizziness, chest pain, sob, abdominal pain, back pain. Admits to severe sharp pain when attempting to ambulate. (05 Mar 2019 16:27)        PAST MEDICAL & SURGICAL HISTORY:  Skin ulcer of left ankle, limited to breakdown of skin  Leg swelling  HTN (hypertension)    vancomycin (Flushing)    Home Medications:  valsartan 40 mg oral tablet: 40 milligram(s) orally once a day (05 Mar 2019 17:50)    No permtinent family history of PVD    REVIEW OF SYSTEMS:  GENERAL:                                         negative  SKIN:                                                see HPI  OPTHALMOLOGIC:                          negative  ENMT:                                               negative  RESPIRATORY AND THORAX:        negative  CARDIOVASCULAR:                        see HPI  GASTROINTESTINAL:                       negative  NEPHROLOGY:                                  negative  MUSCULOSKELETAL:                       negative  NEUROLOGIC:                                   negative  PSYCHIATRIC:                                    negative  HEMATOLOGY/LYMPHATICS:         negative  ENDOCRINE:                                     negative  ALLERGIC/IMMUNOLOGIC:            negative    12 point ROS otherwise normal except as stated in HPI    PHYSICAL EXAM  Vital Signs Last 24 Hrs  T(C): 36.2 (06 Mar 2019 05:13), Max: 37.1 (05 Mar 2019 15:57)  T(F): 97.2 (06 Mar 2019 05:13), Max: 98.7 (05 Mar 2019 15:57)  HR: 65 (06 Mar 2019 05:13) (65 - 74)  BP: 158/69 (06 Mar 2019 05:13) (155/67 - 171/84)  BP(mean): --  RR: 18 (06 Mar 2019 05:13) (18 - 18)  SpO2: 98% (05 Mar 2019 20:30) (98% - 99%)    Appearance: Normal	  HEENT:   Normal oral mucosa, PERRL, EOMI	  Neck: Supple, - JVD;   Cardiovascular: Normal S1 S2, No JVD, No murmurs,   Respiratory: Lungs clear to auscultation, No Rales, Rhonchi, Wheezing	  Gastrointestinal:  Soft, Non-tender, positive BS	  Skin:   Ulceration Left LE, medial lower leg, venus stasis in nature, wound base Fibrogranular, wound size (22.3 cm X 15.2 x 0.5 cm), sharp well demarcated with rolled borders. + edema, + chuy-wound erythema, - purulence, - probe to bone. - streaking erythema. + hemosiderin deposits.  -  Ulceration Left LE, lateral lower leg, venus stasis in nature, wound base Fibrogranular, wound size (3.8  cm X  2.8 x 0.5 cm), sharp well demarcated with rolled borders. + edema, + chuy-wound erythema, - purulence, - probe to bone. - streaking erythema. + hemosiderin deposits.  - Ulcer left sub met 5, limited to the dermis. 0.3x0.2x0.1cm size, stable.   - Edema, erythema, and warmth of the LLE   Extremities: Normal range of motion, No clubbing, cyanosis   Neurologic: Non-focal  Psychiatry: A & O x 3, Mood & affect appropriate      PULSES:  Femoral:  Popliteal:  Dorsal Pedal: dopplerable  Posterior Tibial: dopplerable  Capillary:    MEDICATIONS:   MEDICATIONS  (STANDING):  chlorhexidine 4% Liquid 1 Application(s) Topical <User Schedule>  collagenase Ointment 1 Application(s) Topical two times a day  heparin  Injectable 5000 Unit(s) SubCutaneous every 8 hours  losartan 25 milliGRAM(s) Oral daily    MEDICATIONS  (PRN):      LAB/STUDIES:                        11.0   4.31  )-----------( 268      ( 06 Mar 2019 06:38 )             33.8     03-06    137  |  101  |  10  ----------------------------<  113<H>  3.9   |  25  |  0.6<L>    Ca    8.5      06 Mar 2019 06:38  Mg     2.1     03-06    TPro  6.8  /  Alb  3.9  /  TBili  0.5  /  DBili  x   /  AST  18  /  ALT  8   /  AlkPhos  116<H>  03-05      LIVER FUNCTIONS - ( 05 Mar 2019 14:20 )  Alb: 3.9 g/dL / Pro: 6.8 g/dL / ALK PHOS: 116 U/L / ALT: 8 U/L / AST: 18 U/L / GGT: x                 IMAGING:      < from: VA Duplex Lower Extrem Arterial, Bilat (03.05.19 @ 15:57) >  Bilateral peripheral arterial occlusive disease. Moderate to high-grade   stenosis in the left superficial femoral artery.    < from: VA Duplex Lower Ext Vein Scan, Bilat (03.05.19 @ 15:56) >  No evidence of deep venous thrombosis or superficial thrombophlebitis in   the bilateral lower extremities.

## 2019-03-06 NOTE — CONSULT NOTE ADULT - ASSESSMENT
IMPRESION:   Chronic venous stasis LEs left > right mostly medially with no abscess/ underlying Om/ cellulitis    RECOMMENDATIONS:  No ABx  local care

## 2019-03-06 NOTE — DISCHARGE NOTE ADULT - ADDITIONAL INSTRUCTIONS
Please take medications as prescribed. If symptoms worsen or reoccur, please call 911 or report to the nearest Emergency Medicine Department.

## 2019-03-06 NOTE — DISCHARGE NOTE ADULT - PATIENT PORTAL LINK FT
You can access the [facility] Patient Portal, offered by [facility], by registering with the following website: [email or web address]

## 2019-03-06 NOTE — CONSULT NOTE ADULT - ATTENDING COMMENTS
85 year old with left foot cellulitis    duplex shows sfa stenosis  will plan for angiogram on tuesday
agree with above    pt has pain sub 5th mpj left foot due to plantarflexed 5th metatarsal.  unable to walk    cannot go home without home health aid or needs to go to snf     would rec:  [facility] with cast padding for left foot problem    pt is unaware of dx of malignancy left calf.  I think it is kaposis sarcoma.  pt under the care of doctors in [place] for this problem but pt unaware of the dx and the family does not want pt to know of her dx.

## 2019-03-07 LAB
ALBUMIN SERPL ELPH-MCNC: 3.5 G/DL — SIGNIFICANT CHANGE UP (ref 3.5–5.2)
ALP SERPL-CCNC: 98 U/L — SIGNIFICANT CHANGE UP (ref 30–115)
ALT FLD-CCNC: 6 U/L — SIGNIFICANT CHANGE UP (ref 0–41)
ANION GAP SERPL CALC-SCNC: 12 MMOL/L — SIGNIFICANT CHANGE UP (ref 7–14)
AST SERPL-CCNC: 11 U/L — SIGNIFICANT CHANGE UP (ref 0–41)
BASOPHILS # BLD AUTO: 0.02 K/UL — SIGNIFICANT CHANGE UP (ref 0–0.2)
BASOPHILS NFR BLD AUTO: 0.5 % — SIGNIFICANT CHANGE UP (ref 0–1)
BILIRUB SERPL-MCNC: 0.5 MG/DL — SIGNIFICANT CHANGE UP (ref 0.2–1.2)
BUN SERPL-MCNC: 11 MG/DL — SIGNIFICANT CHANGE UP (ref 10–20)
CALCIUM SERPL-MCNC: 8.8 MG/DL — SIGNIFICANT CHANGE UP (ref 8.5–10.1)
CHLORIDE SERPL-SCNC: 102 MMOL/L — SIGNIFICANT CHANGE UP (ref 98–110)
CO2 SERPL-SCNC: 26 MMOL/L — SIGNIFICANT CHANGE UP (ref 17–32)
CREAT SERPL-MCNC: 0.6 MG/DL — LOW (ref 0.7–1.5)
EOSINOPHIL # BLD AUTO: 0.36 K/UL — SIGNIFICANT CHANGE UP (ref 0–0.7)
EOSINOPHIL NFR BLD AUTO: 9.4 % — HIGH (ref 0–8)
GLUCOSE SERPL-MCNC: 97 MG/DL — SIGNIFICANT CHANGE UP (ref 70–99)
HCT VFR BLD CALC: 36 % — LOW (ref 37–47)
HGB BLD-MCNC: 11.6 G/DL — LOW (ref 12–16)
IMM GRANULOCYTES NFR BLD AUTO: 0.3 % — SIGNIFICANT CHANGE UP (ref 0.1–0.3)
LYMPHOCYTES # BLD AUTO: 0.79 K/UL — LOW (ref 1.2–3.4)
LYMPHOCYTES # BLD AUTO: 20.6 % — SIGNIFICANT CHANGE UP (ref 20.5–51.1)
MAGNESIUM SERPL-MCNC: 2.2 MG/DL — SIGNIFICANT CHANGE UP (ref 1.8–2.4)
MCHC RBC-ENTMCNC: 29.9 PG — SIGNIFICANT CHANGE UP (ref 27–31)
MCHC RBC-ENTMCNC: 32.2 G/DL — SIGNIFICANT CHANGE UP (ref 32–37)
MCV RBC AUTO: 92.8 FL — SIGNIFICANT CHANGE UP (ref 81–99)
MONOCYTES # BLD AUTO: 0.56 K/UL — SIGNIFICANT CHANGE UP (ref 0.1–0.6)
MONOCYTES NFR BLD AUTO: 14.6 % — HIGH (ref 1.7–9.3)
NEUTROPHILS # BLD AUTO: 2.1 K/UL — SIGNIFICANT CHANGE UP (ref 1.4–6.5)
NEUTROPHILS NFR BLD AUTO: 54.6 % — SIGNIFICANT CHANGE UP (ref 42.2–75.2)
NRBC # BLD: 0 /100 WBCS — SIGNIFICANT CHANGE UP (ref 0–0)
PLATELET # BLD AUTO: 284 K/UL — SIGNIFICANT CHANGE UP (ref 130–400)
POTASSIUM SERPL-MCNC: 4.4 MMOL/L — SIGNIFICANT CHANGE UP (ref 3.5–5)
POTASSIUM SERPL-SCNC: 4.4 MMOL/L — SIGNIFICANT CHANGE UP (ref 3.5–5)
PROT SERPL-MCNC: 6.1 G/DL — SIGNIFICANT CHANGE UP (ref 6–8)
RBC # BLD: 3.88 M/UL — LOW (ref 4.2–5.4)
RBC # FLD: 14 % — SIGNIFICANT CHANGE UP (ref 11.5–14.5)
SODIUM SERPL-SCNC: 140 MMOL/L — SIGNIFICANT CHANGE UP (ref 135–146)
VIT D25+D1,25 OH+D1,25 PNL SERPL-MCNC: 54.1 PG/ML — SIGNIFICANT CHANGE UP (ref 19.9–79.3)
WBC # BLD: 3.84 K/UL — LOW (ref 4.8–10.8)
WBC # FLD AUTO: 3.84 K/UL — LOW (ref 4.8–10.8)

## 2019-03-07 PROCEDURE — 99222 1ST HOSP IP/OBS MODERATE 55: CPT

## 2019-03-07 RX ORDER — SENNA PLUS 8.6 MG/1
2 TABLET ORAL AT BEDTIME
Qty: 0 | Refills: 0 | Status: DISCONTINUED | OUTPATIENT
Start: 2019-03-07 | End: 2019-03-12

## 2019-03-07 RX ORDER — GABAPENTIN 400 MG/1
300 CAPSULE ORAL AT BEDTIME
Qty: 0 | Refills: 0 | Status: DISCONTINUED | OUTPATIENT
Start: 2019-03-07 | End: 2019-03-12

## 2019-03-07 RX ORDER — OLANZAPINE 15 MG/1
5 TABLET, FILM COATED ORAL AT BEDTIME
Qty: 0 | Refills: 0 | Status: DISCONTINUED | OUTPATIENT
Start: 2019-03-07 | End: 2019-03-07

## 2019-03-07 RX ORDER — DOCUSATE SODIUM 100 MG
100 CAPSULE ORAL THREE TIMES A DAY
Qty: 0 | Refills: 0 | Status: DISCONTINUED | OUTPATIENT
Start: 2019-03-07 | End: 2019-03-12

## 2019-03-07 RX ORDER — OLANZAPINE 15 MG/1
2.5 TABLET, FILM COATED ORAL
Qty: 0 | Refills: 0 | Status: DISCONTINUED | OUTPATIENT
Start: 2019-03-07 | End: 2019-03-12

## 2019-03-07 RX ADMIN — HEPARIN SODIUM 5000 UNIT(S): 5000 INJECTION INTRAVENOUS; SUBCUTANEOUS at 06:32

## 2019-03-07 RX ADMIN — Medication 1 APPLICATION(S): at 06:42

## 2019-03-07 RX ADMIN — Medication 100 MILLIGRAM(S): at 21:27

## 2019-03-07 RX ADMIN — Medication 100 MILLIGRAM(S): at 13:15

## 2019-03-07 RX ADMIN — FENTANYL CITRATE 1 PATCH: 50 INJECTION INTRAVENOUS at 20:38

## 2019-03-07 RX ADMIN — GABAPENTIN 300 MILLIGRAM(S): 400 CAPSULE ORAL at 21:27

## 2019-03-07 RX ADMIN — HEPARIN SODIUM 5000 UNIT(S): 5000 INJECTION INTRAVENOUS; SUBCUTANEOUS at 21:27

## 2019-03-07 RX ADMIN — OLANZAPINE 2.5 MILLIGRAM(S): 15 TABLET, FILM COATED ORAL at 18:04

## 2019-03-07 RX ADMIN — SENNA PLUS 2 TABLET(S): 8.6 TABLET ORAL at 21:27

## 2019-03-07 RX ADMIN — Medication 1 APPLICATION(S): at 18:04

## 2019-03-07 RX ADMIN — FENTANYL CITRATE 1 PATCH: 50 INJECTION INTRAVENOUS at 06:23

## 2019-03-07 RX ADMIN — LOSARTAN POTASSIUM 25 MILLIGRAM(S): 100 TABLET, FILM COATED ORAL at 06:32

## 2019-03-07 RX ADMIN — HEPARIN SODIUM 5000 UNIT(S): 5000 INJECTION INTRAVENOUS; SUBCUTANEOUS at 13:15

## 2019-03-07 NOTE — CONSULT NOTE ADULT - SUBJECTIVE AND OBJECTIVE BOX
HPI:  84 yo F  PMH: HTN  cc: sent in by podiatrist for the evaluation of cellulitis.   History: Patient states that history began three years ago. She was taking the trash outside while it was raining and fell down 15 steps injuring both her legs. The right lower extremity has healed well however the left lower extremity has not. She has been following with a podiatrist for chronic ulcers she has developed. Patient has been having right foot pain x 3 days. She said that last night the pain became very severe. She tried to take two Tylenol which usually helps to alleviate the pain however, this time it did not improve. She went to her podiatrist in AM and had a debridement done. She was then instructed to come to the hospital for IV antibiotics and to r/o cellulitis. Patient denies f/n/v/d/c, chills, headache, dizziness, chest pain, sob, abdominal pain, back pain. Admits to severe sharp pain when attempting to ambulate. (05 Mar 2019 16:27)      PAST MEDICAL & SURGICAL HISTORY:  Skin ulcer of left ankle, limited to breakdown of skin  Leg swelling  HTN (hypertension)      Hospital Course:  She is decondiotioned and weak. She has chronic venous insuff. She was found to have PAD LLE. For angiogram on Tues.  TODAY'S SUBJECTIVE & REVIEW OF SYMPTOMS:     Constitutional WNL   Cardio WNL   Resp WNL   GI WNL  Heme WNL  Endo WNL  Skin WNL  MSK WNL  Neuro WNL  Cognitive WNL  Psych WNL      MEDICATIONS  (STANDING):  chlorhexidine 4% Liquid 1 Application(s) Topical <User Schedule>  collagenase Ointment 1 Application(s) Topical two times a day  docusate sodium 100 milliGRAM(s) Oral three times a day  fentaNYL   Patch  12 MICROgram(s)/Hr 1 Patch Transdermal every 72 hours  heparin  Injectable 5000 Unit(s) SubCutaneous every 8 hours  losartan 25 milliGRAM(s) Oral daily  OLANZapine 5 milliGRAM(s) Oral at bedtime  senna 2 Tablet(s) Oral at bedtime    MEDICATIONS  (PRN):      FAMILY HISTORY:      Allergies    vancomycin (Flushing)    Intolerances        SOCIAL HISTORY:    [  ] Etoh  [  ] Smoking  [  ] Substance abuse     Home Environment:  [  ] Home Alone  [x  ] Lives with Family- who has some disability  [  ] Home Health Aid    Dwelling:  [  ] Apartment  [  ] Private House  [  ] Adult Home  [  ] Skilled Nursing Facility      [  ] Short Term  [  ] Long Term  [  ] Stairs       Elevator [  ]    FUNCTIONAL STATUS PTA: (Check all that apply)  Ambulation: [ x  ]Independent    [  ] Dependent     [  ] Non-Ambulatory  Assistive Device: [  ] SA Cane  [  ]  Q Cane  [ x ] Walker (recent use of rollator walker)[  ]  Wheelchair  ADL : [  ] Independent  [  ]  Dependent       Vital Signs Last 24 Hrs  T(C): 36 (07 Mar 2019 13:28), Max: 36.5 (07 Mar 2019 05:30)  T(F): 96.8 (07 Mar 2019 13:28), Max: 97.7 (07 Mar 2019 05:30)  HR: 68 (07 Mar 2019 13:28) (65 - 68)  BP: 137/67 (07 Mar 2019 13:28) (137/67 - 167/74)  BP(mean): --  RR: 20 (07 Mar 2019 13:28) (18 - 20)  SpO2: --      PHYSICAL EXAM: Alert & Oriented X3  GENERAL: NAD, well-groomed, well-developed  HEAD:  Atraumatic, Normocephalic  EYES: EOMI, PERRLA, conjunctiva and sclera clear  NECK: Supple, No JVD, Normal thyroid  CHEST/LUNG: Clear to percussion bilaterally; No rales, rhonchi, wheezing, or rubs  HEART: Regular rate and rhythm; No murmurs, rubs, or gallops  ABDOMEN: Soft, Nontender, Nondistended; Bowel sounds present  EXTREMITIES:  2+ Peripheral Pulses, No clubbing, cyanosis, or edema    NERVOUS SYSTEM:  Cranial Nerves 2-12 intact [  ] Abnormal  [  ]  ROM: WFL all extremities [  ]  Abnormal [  ]  Motor Strength: WFL all extremities  [  ]  Abnormal [x  ] 4+/5 to 5-/5 LE's  Sensation: intact to light touch [  ] Abnormal [  ]  Reflexes: Symmetric [  ]  Abnormal [  ]    FUNCTIONAL STATUS:  Bed Mobility: Independent [  ]  Supervision [  ]  Needs Assistance [  ]  N/A [  ]  Transfers: Independent [  ]  Supervision [  ]  Needs Assistance [  ]  N/A [  ]   Ambulation: Independent [  ]  Supervision [  ]  Needs Assistance [  ]  N/A [  ]  ADL: Independent [  ] Requires Assistance [  ] N/A [  ]      LABS:                        11.6   3.84  )-----------( 284      ( 07 Mar 2019 06:27 )             36.0     03-07    140  |  102  |  11  ----------------------------<  97  4.4   |  26  |  0.6<L>    Ca    8.8      07 Mar 2019 06:27  Mg     2.2     03-07    TPro  6.1  /  Alb  3.5  /  TBili  0.5  /  DBili  x   /  AST  11  /  ALT  6   /  AlkPhos  98  03-07          RADIOLOGY & ADDITIONAL STUDIES:    Assesment:

## 2019-03-07 NOTE — PROGRESS NOTE ADULT - ASSESSMENT
IMPRESION:   Chronic venous stasis LEs left > right mostly medially with no abscess/ underlying Om/ cellulitis  Vascular read: might need an angiogram    RECOMMENDATIONS:  No ABx  local care  Recall prn please

## 2019-03-07 NOTE — PROGRESS NOTE ADULT - SUBJECTIVE AND OBJECTIVE BOX
KALI ANDERS  85y, Female      OVERNIGHT EVENTS:    no fevers, feels well    VITALS:  T(F): 97.7, Max: 97.7 (03-07-19 @ 05:30)  HR: 65  BP: 152/72  RR: 18Vital Signs Last 24 Hrs  T(C): 36.5 (07 Mar 2019 05:30), Max: 36.5 (07 Mar 2019 05:30)  T(F): 97.7 (07 Mar 2019 05:30), Max: 97.7 (07 Mar 2019 05:30)  HR: 65 (07 Mar 2019 05:30) (65 - 67)  BP: 152/72 (07 Mar 2019 05:30) (141/65 - 167/74)  BP(mean): --  RR: 18 (07 Mar 2019 05:30) (18 - 18)  SpO2: --    TESTS & MEASUREMENTS:                        11.6   3.84  )-----------( 284      ( 07 Mar 2019 06:27 )             36.0     03-07    140  |  102  |  11  ----------------------------<  97  4.4   |  26  |  0.6<L>    Ca    8.8      07 Mar 2019 06:27  Mg     2.2     03-07    TPro  6.1  /  Alb  3.5  /  TBili  0.5  /  DBili  x   /  AST  11  /  ALT  6   /  AlkPhos  98  03-07    LIVER FUNCTIONS - ( 07 Mar 2019 06:27 )  Alb: 3.5 g/dL / Pro: 6.1 g/dL / ALK PHOS: 98 U/L / ALT: 6 U/L / AST: 11 U/L / GGT: x             Culture - Blood (collected 03-05-19 @ 21:12)  Source: .Blood None  Preliminary Report (03-07-19 @ 03:01):    No growth to date.            RADIOLOGY & ADDITIONAL TESTS:    ANTIBIOTICS:

## 2019-03-07 NOTE — PROGRESS NOTE ADULT - ASSESSMENT
KALI ANDERS 85y Female  MRN#: [id number]       SUBJECTIVE  Patient is a 85y old Female who presents with a chief complaint of Currently admitted to medicine with the primary diagnosis of left lower extremity swelling       Today is hospital day 2d, according to nursing staff, patient was sundowning overnight. Spoke to son this morning regarding this and he reported that she takes 5mg olanzapine for this.   Otherwise, patient appears comfortable, sitting up in chair at bedside. reporting adequate pain control, no CP, no SOB, tolerating PO diet.   As per vascular surgery team, patient is scheduled for angiogram on Tuesday 3/12    OBJECTIVE  Home Medications:  valsartan 40 mg oral tablet: 40 milligram(s) orally once a day (05 Mar 2019 17:50)    MEDICATIONS:  STANDING MEDICATIONS  chlorhexidine 4% Liquid 1 Application(s) Topical <User Schedule>  collagenase Ointment 1 Application(s) Topical two times a day  docusate sodium 100 milliGRAM(s) Oral three times a day  fentaNYL   Patch  12 MICROgram(s)/Hr 1 Patch Transdermal every 72 hours  heparin  Injectable 5000 Unit(s) SubCutaneous every 8 hours  losartan 25 milliGRAM(s) Oral daily  senna 2 Tablet(s) Oral at bedtime    PRN MEDICATIONS      VITAL SIGNS: Last 24 Hours  T(C): 36.5 (07 Mar 2019 05:30), Max: 36.5 (07 Mar 2019 05:30)  T(F): 97.7 (07 Mar 2019 05:30), Max: 97.7 (07 Mar 2019 05:30)  HR: 65 (07 Mar 2019 05:30) (65 - 67)  BP: 152/72 (07 Mar 2019 05:30) (141/65 - 167/74)  BP(mean): --  RR: 18 (07 Mar 2019 05:30) (18 - 18)  SpO2: --    LABS:                        11.6   3.84  )-----------( 284      ( 07 Mar 2019 06:27 )             36.0     03-07    140  |  102  |  11  ----------------------------<  97  4.4   |  26  |  0.6<L>    Ca    8.8      07 Mar 2019 06:27  Mg     2.2     03-07    TPro  6.1  /  Alb  3.5  /  TBili  0.5  /  DBili  x   /  AST  11  /  ALT  6   /  AlkPhos  98  03-07    LIVER FUNCTIONS - ( 07 Mar 2019 06:27 )  Alb: 3.5 g/dL / Pro: 6.1 g/dL / ALK PHOS: 98 U/L / ALT: 6 U/L / AST: 11 U/L / GGT: x                     Culture - Blood (collected 05 Mar 2019 21:12)  Source: .Blood None  Preliminary Report (07 Mar 2019 03:01):    No growth to date.          RADIOLOGY:  < from: Xray Foot AP + Lateral + Oblique, Left (03.05.19 @ 15:14) >  There is no evidence of acute fracture or dislocation. Diffuse   osteopenia. Scattered degenerative changes. Mild soft tissue swelling of   the foot, without any radiopaque foreign body.    Impression: No acute fracture or dislocation.      < end of copied text >    < from: [facility] Duplex Lower Ext Vein Scan, Bilat (03.05.19 @ 15:56) >  Impression:    No evidence of deep venous thrombosis or superficial thrombophlebitis in   the bilateral lower extremities.    < end of copied text >    < from: VA Duplex Lower Extrem Arterial, Bilat (03.05.19 @ 15:57) >  Impression:    Bilateral peripheral arterial occlusive disease. Moderate to high-grade   stenosis in the left superficial femoral artery.    < end of copied text >    PHYSICAL EXAM:    GENERAL: NAD, well-developed, AAOx3  HEENT:  Atraumatic, Normocephalic. EOMI, PERRLA no JVD  PULMONARY: Clear to auscultation bilaterally; No wheeze  CARDIOVASCULAR: Regular rate and rhythm; + holosystolic murmur  GASTROINTESTINAL: Soft, Nontender, Nondistended; Bowel sounds present  MUSCULOSKELETAL:  legs wrapped in b/l ace bandages from ankle to thigh (exam limited given that patient had dressing changed this morning and explained they were very painful). Based on previous pictures:  has large left medial ankle extending to posterior heel. base is clean granulating tissue, rolled borders without erythema, no pus.   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 (as per son, gets Doxil every three weeks, last treatment was in February)   -  Arterial duplex positive for moderate to severe stenosis in left superficial femoral artery, no DVT  - Vascular planning for Angiogram next Tuesday (3/12)  -ID following: no antibiotics needed at this time  - FU Blood cultures  - Dressing changes as per podiatry- cw with Inés    #HTN  cw losartan   (takes valsartan at home)    #Dementia/ sundowning  - cw home meds- olanzapine     #Diet: DASH diet  #DVT ppx: heparin subq  # GI ppx: not indicated  #Dispo: from home, can likely go back home when medically stable  Pt/Rehab ordered on 3/7  Spoke with son Berry today. phone number: [phone] KALI ANDERS 85y Female  MRN#: [id number]       SUBJECTIVE  Patient is a 85y old Female who presents with a chief complaint of Currently admitted to medicine with the primary diagnosis of left lower extremity swelling       Today is hospital day 2d, according to nursing staff, patient was sundowning overnight. Spoke to son this morning regarding this and he reported that she takes 5mg olanzapine for this.   Otherwise, patient appears comfortable, sitting up in chair at bedside. reporting adequate pain control, no CP, no SOB, tolerating PO diet.   As per vascular surgery team, patient is scheduled for angiogram on Tuesday 3/12    OBJECTIVE  Home Medications:  valsartan 40 mg oral tablet: 40 milligram(s) orally once a day (05 Mar 2019 17:50)    MEDICATIONS:  STANDING MEDICATIONS  chlorhexidine 4% Liquid 1 Application(s) Topical <User Schedule>  collagenase Ointment 1 Application(s) Topical two times a day  docusate sodium 100 milliGRAM(s) Oral three times a day  fentaNYL   Patch  12 MICROgram(s)/Hr 1 Patch Transdermal every 72 hours  heparin  Injectable 5000 Unit(s) SubCutaneous every 8 hours  losartan 25 milliGRAM(s) Oral daily  senna 2 Tablet(s) Oral at bedtime    PRN MEDICATIONS      VITAL SIGNS: Last 24 Hours  T(C): 36.5 (07 Mar 2019 05:30), Max: 36.5 (07 Mar 2019 05:30)  T(F): 97.7 (07 Mar 2019 05:30), Max: 97.7 (07 Mar 2019 05:30)  HR: 65 (07 Mar 2019 05:30) (65 - 67)  BP: 152/72 (07 Mar 2019 05:30) (141/65 - 167/74)  BP(mean): --  RR: 18 (07 Mar 2019 05:30) (18 - 18)  SpO2: --    LABS:                        11.6   3.84  )-----------( 284      ( 07 Mar 2019 06:27 )             36.0     03-07    140  |  102  |  11  ----------------------------<  97  4.4   |  26  |  0.6<L>    Ca    8.8      07 Mar 2019 06:27  Mg     2.2     03-07    TPro  6.1  /  Alb  3.5  /  TBili  0.5  /  DBili  x   /  AST  11  /  ALT  6   /  AlkPhos  98  03-07    LIVER FUNCTIONS - ( 07 Mar 2019 06:27 )  Alb: 3.5 g/dL / Pro: 6.1 g/dL / ALK PHOS: 98 U/L / ALT: 6 U/L / AST: 11 U/L / GGT: x                     Culture - Blood (collected 05 Mar 2019 21:12)  Source: .Blood None  Preliminary Report (07 Mar 2019 03:01):    No growth to date.          RADIOLOGY:  < from: Xray Foot AP + Lateral + Oblique, Left (03.05.19 @ 15:14) >  There is no evidence of acute fracture or dislocation. Diffuse   osteopenia. Scattered degenerative changes. Mild soft tissue swelling of   the foot, without any radiopaque foreign body.    Impression: No acute fracture or dislocation.      < end of copied text >    < from: VA Duplex Lower Ext Vein Scan, Bilat (03.05.19 @ 15:56) >  Impression:    No evidence of deep venous thrombosis or superficial thrombophlebitis in   the bilateral lower extremities.    < end of copied text >    < from: VA Duplex Lower Extrem Arterial, Bilat (03.05.19 @ 15:57) >  Impression:    Bilateral peripheral arterial occlusive disease. Moderate to high-grade   stenosis in the left superficial femoral artery.    < end of copied text >    PHYSICAL EXAM:    GENERAL: NAD, well-developed, AAOx3  HEENT:  Atraumatic, Normocephalic. EOMI, PERRLA no JVD  PULMONARY: Clear to auscultation bilaterally; No wheeze  CARDIOVASCULAR: Regular rate and rhythm; + holosystolic murmur  GASTROINTESTINAL: Soft, Nontender, Nondistended; Bowel sounds present  MUSCULOSKELETAL:  legs wrapped in b/l ace bandages from ankle to thigh (exam limited given that patient had dressing changed this morning and explained they were very painful). Based on previous pictures:  has large left medial ankle extending to posterior heel. base is clean granulating tissue, rolled borders without erythema, no pus.   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 (as per son, gets Doxil every four weeks, last treatment was 2/21)   -  Arterial duplex positive for moderate to severe stenosis in left superficial femoral artery, no DVT  - Vascular planning for Angiogram next Tuesday (3/12)  -ID following: no antibiotics needed at this time  - FU Blood cultures  - Dressing changes as per podiatry- cw with Inés    #HTN  cw losartan   (takes valsartan at home)    #Dementia/ sundowning  - cw home meds- olanzapine     #Diet: DASH diet  #DVT ppx: heparin subq  # GI ppx: not indicated  #Dispo: from home, can likely go back home when medically stable  Pt/Rehab ordered on 3/7  Spoke with son Berry today. phone number: [phone] KALI ANDERS 85y Female  MRN#: [id number]       SUBJECTIVE  Patient is a 85y old Female who presents with a chief complaint of Currently admitted to medicine with the primary diagnosis of left lower extremity swelling    Today is hospital day 2d, according to nursing staff, patient was sundowning overnight. Spoke to son this morning regarding this and he reported that she takes 5mg olanzapine for this.   Otherwise, patient appears comfortable, sitting up in chair at bedside. reporting adequate pain control, no CP, no SOB, tolerating PO diet.   As per vascular surgery team, patient is scheduled for angiogram on Tuesday 3/12    OBJECTIVE  Home Medications:  valsartan 40 mg oral tablet: 40 milligram(s) orally once a day (05 Mar 2019 17:50)    MEDICATIONS:  chlorhexidine 4% Liquid 1 Application(s) Topical <User Schedule>  collagenase Ointment 1 Application(s) Topical two times a day  docusate sodium 100 milliGRAM(s) Oral three times a day  fentaNYL   Patch  12 MICROgram(s)/Hr 1 Patch Transdermal every 72 hours  heparin  Injectable 5000 Unit(s) SubCutaneous every 8 hours  losartan 25 milliGRAM(s) Oral daily  senna 2 Tablet(s) Oral at bedtime      VITAL SIGNS: Last 24 Hours  T(C): 36.5 (07 Mar 2019 05:30), Max: 36.5 (07 Mar 2019 05:30)  T(F): 97.7 (07 Mar 2019 05:30), Max: 97.7 (07 Mar 2019 05:30)  HR: 65 (07 Mar 2019 05:30) (65 - 67)  BP: 152/72 (07 Mar 2019 05:30) (141/65 - 167/74)  RR: 18 (07 Mar 2019 05:30) (18 - 18)    LABS:                        11.6   3.84  )-----------( 284      ( 07 Mar 2019 06:27 )             36.0     03-07    140  |  102  |  11  ----------------------------<  97  4.4   |  26  |  0.6<L>    Ca    8.8      07 Mar 2019 06:27  Mg     2.2     03-07    TPro  6.1  /  Alb  3.5  /  TBili  0.5  /  DBili  x   /  AST  11  /  ALT  6   /  AlkPhos  98  03-07    LIVER FUNCTIONS - ( 07 Mar 2019 06:27 )  Alb: 3.5 g/dL / Pro: 6.1 g/dL / ALK PHOS: 98 U/L / ALT: 6 U/L / AST: 11 U/L / GGT: x           Culture - Blood (collected 05 Mar 2019 21:12)  Source: .Blood None  Preliminary Report (07 Mar 2019 03:01):  No growth to date.    RADIOLOGY:  < from: Xray Foot AP + Lateral + Oblique, Left (03.05.19 @ 15:14) >  There is no evidence of acute fracture or dislocation. Diffuse   osteopenia. Scattered degenerative changes. Mild soft tissue swelling of   the foot, without any radiopaque foreign body.    Impression: No acute fracture or dislocation.      < end of copied text >    < from: VA Duplex Lower Ext Vein Scan, Bilat (03.05.19 @ 15:56) >  Impression:    No evidence of deep venous thrombosis or superficial thrombophlebitis in   the bilateral lower extremities.    < end of copied text >    < from: VA Duplex Lower Extrem Arterial, Bilat (03.05.19 @ 15:57) >  Impression:    Bilateral peripheral arterial occlusive disease. Moderate to high-grade   stenosis in the left superficial femoral artery.    < end of copied text >    PHYSICAL EXAM:    GENERAL: NAD, well-developed, AAOx3  HEENT:  Atraumatic, Normocephalic. EOMI, PERRLA no JVD  PULMONARY: Clear to auscultation bilaterally; No wheeze  CARDIOVASCULAR: Regular rate and rhythm; + holosystolic murmur  GASTROINTESTINAL: Soft, Nontender, Nondistended; Bowel sounds present  MUSCULOSKELETAL:  legs wrapped in b/l ace bandages from ankle to thigh (exam limited given that patient had dressing changed this morning and explained they were very painful). Based on previous pictures:  has large left medial ankle extending to posterior heel. base is clean granulating tissue, rolled borders without erythema, no pus.   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 Chance (as per son, gets Doxil every four weeks, last treatment was 2/21)   -  Arterial duplex positive for moderate to severe stenosis in left superficial femoral artery, no DVT  - Vascular planning for Angiogram next Tuesday (3/12)  -ID following: no antibiotics needed at this time  - FU Blood cultures  - Dressing changes as per podiatry- cw with Inés    #HTN  cw losartan   (takes valsartan at home)    #Dementia/ sundowning  - cw home meds- olanzapine     #Diet: DASH diet  #DVT ppx: heparin subq  # GI ppx: not indicated  #Dispo: from home, can likely go back home when medically stable  Pt/Rehab ordered on 3/7  Spoke with son Berry today. phone number: [phone]      Attending Attestation    Pt has been seen and examined. Case and Plan discussed with Team,  and wife at bedside. Pt and  prefers to involve their son Washington at [phone]. Pt is here for LLE Ulcer and found to have PVD as above. Per vascular pt will need an angiogram scheduled for Tuesday. Will call vascular to try to expedite to sooner.   Otherwise continue with all meds per orders.  PT / SNF evaluation  Agree with above documentation as corrected.     #Progress Note Handoff  Pending  Consults:   Tests: Angiogram on Tuesday per Vascular  Test Results:  Family Discussion: Family and pt want to pursue procedure  Future Disposition: Likely SNF

## 2019-03-07 NOTE — CONSULT NOTE ADULT - ASSESSMENT
IMPRESSION: Rehab of debility, chronic venous insuff, PAD, for angiogram on Suzanne Ku     PRECAUTIONS: [x  ] Cardiac  [  ] Respiratory  [  ] Seizures [  ] Contact Isolation  [  ] Droplet Isolation  [  ] Other    Weight Bearing Status: WBAT Bilat LE's; soft surgical darco shoes bilateral are suggested    RECOMMENDATION:    Elevation of LE's as much as possible.  Out of Bed to Chair     DVT/Decubiti Prophylaxis    REHAB PLAN:     [ xx  ] Bedside P/T 3-5 times a week   [   ]   Bedside O/T  2-3 times a week             [   ] No Rehab Therapy Indicated                   [   ]  Speech Therapy   Conditioning/ROM                                    ADL  Bed Mobility                                               Conditioning/ROM  Transfers                                                     Bed Mobility  Sitting /Standing Balance                         Transfers                                        Gait Training                                               Sitting/Standing Balance  Stair Training [   ]Applicable                    Home equipment Eval                                                                        Splinting  [   ] Only      GOALS:   ADL   [x   ]   Independent                    Transfers  [ x  ] Independent                          Ambulation  [x   ] Independent     [    ] With device                            [   ]  CG                                                         [   ]  CG                                                                  [   ] CG                            [    ] Min A                                                   [   ] Min A                                                              [   ] Min  A          DISCHARGE PLAN:   [   ]  Good candidate for Intensive Rehabilitation/Hospital based-4A [facility]                                             Will tolerate 3hrs Intensive Rehab Daily                                       [  x  ]  Short Term Rehab in Skilled Nursing Facility                                                                 VS                                     [x    ]  Home with Outpatient or VN services                                         [    ]  Possible Candidate for Intensive Hospital based Rehab

## 2019-03-08 LAB
ALBUMIN SERPL ELPH-MCNC: 3.6 G/DL — SIGNIFICANT CHANGE UP (ref 3.5–5.2)
ALP SERPL-CCNC: 98 U/L — SIGNIFICANT CHANGE UP (ref 30–115)
ALT FLD-CCNC: 7 U/L — SIGNIFICANT CHANGE UP (ref 0–41)
ANION GAP SERPL CALC-SCNC: 12 MMOL/L — SIGNIFICANT CHANGE UP (ref 7–14)
AST SERPL-CCNC: 13 U/L — SIGNIFICANT CHANGE UP (ref 0–41)
BASOPHILS # BLD AUTO: 0.02 K/UL — SIGNIFICANT CHANGE UP (ref 0–0.2)
BASOPHILS NFR BLD AUTO: 0.5 % — SIGNIFICANT CHANGE UP (ref 0–1)
BILIRUB SERPL-MCNC: 0.4 MG/DL — SIGNIFICANT CHANGE UP (ref 0.2–1.2)
BUN SERPL-MCNC: 13 MG/DL — SIGNIFICANT CHANGE UP (ref 10–20)
CALCIUM SERPL-MCNC: 9.1 MG/DL — SIGNIFICANT CHANGE UP (ref 8.5–10.1)
CHLORIDE SERPL-SCNC: 105 MMOL/L — SIGNIFICANT CHANGE UP (ref 98–110)
CO2 SERPL-SCNC: 26 MMOL/L — SIGNIFICANT CHANGE UP (ref 17–32)
CREAT SERPL-MCNC: 0.7 MG/DL — SIGNIFICANT CHANGE UP (ref 0.7–1.5)
EOSINOPHIL # BLD AUTO: 0.44 K/UL — SIGNIFICANT CHANGE UP (ref 0–0.7)
EOSINOPHIL NFR BLD AUTO: 11.7 % — HIGH (ref 0–8)
ESTIMATED AVERAGE GLUCOSE: 123 MG/DL — HIGH (ref 68–114)
GLUCOSE SERPL-MCNC: 103 MG/DL — HIGH (ref 70–99)
HBA1C BLD-MCNC: 5.9 % — HIGH (ref 4–5.6)
HCT VFR BLD CALC: 36.2 % — LOW (ref 37–47)
HGB BLD-MCNC: 11.6 G/DL — LOW (ref 12–16)
IMM GRANULOCYTES NFR BLD AUTO: 0.3 % — SIGNIFICANT CHANGE UP (ref 0.1–0.3)
LYMPHOCYTES # BLD AUTO: 0.62 K/UL — LOW (ref 1.2–3.4)
LYMPHOCYTES # BLD AUTO: 16.5 % — LOW (ref 20.5–51.1)
MAGNESIUM SERPL-MCNC: 2.4 MG/DL — SIGNIFICANT CHANGE UP (ref 1.8–2.4)
MCHC RBC-ENTMCNC: 30.1 PG — SIGNIFICANT CHANGE UP (ref 27–31)
MCHC RBC-ENTMCNC: 32 G/DL — SIGNIFICANT CHANGE UP (ref 32–37)
MCV RBC AUTO: 94 FL — SIGNIFICANT CHANGE UP (ref 81–99)
MONOCYTES # BLD AUTO: 0.58 K/UL — SIGNIFICANT CHANGE UP (ref 0.1–0.6)
MONOCYTES NFR BLD AUTO: 15.5 % — HIGH (ref 1.7–9.3)
NEUTROPHILS # BLD AUTO: 2.08 K/UL — SIGNIFICANT CHANGE UP (ref 1.4–6.5)
NEUTROPHILS NFR BLD AUTO: 55.5 % — SIGNIFICANT CHANGE UP (ref 42.2–75.2)
NRBC # BLD: 0 /100 WBCS — SIGNIFICANT CHANGE UP (ref 0–0)
PLATELET # BLD AUTO: 319 K/UL — SIGNIFICANT CHANGE UP (ref 130–400)
POTASSIUM SERPL-MCNC: 4.6 MMOL/L — SIGNIFICANT CHANGE UP (ref 3.5–5)
POTASSIUM SERPL-SCNC: 4.6 MMOL/L — SIGNIFICANT CHANGE UP (ref 3.5–5)
PROT SERPL-MCNC: 6 G/DL — SIGNIFICANT CHANGE UP (ref 6–8)
RBC # BLD: 3.85 M/UL — LOW (ref 4.2–5.4)
RBC # FLD: 14.1 % — SIGNIFICANT CHANGE UP (ref 11.5–14.5)
SODIUM SERPL-SCNC: 143 MMOL/L — SIGNIFICANT CHANGE UP (ref 135–146)
WBC # BLD: 3.75 K/UL — LOW (ref 4.8–10.8)
WBC # FLD AUTO: 3.75 K/UL — LOW (ref 4.8–10.8)

## 2019-03-08 PROCEDURE — 93306 TTE W/DOPPLER COMPLETE: CPT | Mod: 26

## 2019-03-08 RX ORDER — GABAPENTIN 400 MG/1
1 CAPSULE ORAL
Qty: 15 | Refills: 0 | OUTPATIENT
Start: 2019-03-08 | End: 2019-03-22

## 2019-03-08 RX ORDER — FENTANYL CITRATE 50 UG/ML
1 INJECTION INTRAVENOUS
Qty: 0 | Refills: 0 | COMMUNITY
Start: 2019-03-08

## 2019-03-08 RX ADMIN — Medication 100 MILLIGRAM(S): at 21:05

## 2019-03-08 RX ADMIN — Medication 100 MILLIGRAM(S): at 13:19

## 2019-03-08 RX ADMIN — Medication 100 MILLIGRAM(S): at 06:14

## 2019-03-08 RX ADMIN — Medication 1 APPLICATION(S): at 14:51

## 2019-03-08 RX ADMIN — SENNA PLUS 2 TABLET(S): 8.6 TABLET ORAL at 21:05

## 2019-03-08 RX ADMIN — HEPARIN SODIUM 5000 UNIT(S): 5000 INJECTION INTRAVENOUS; SUBCUTANEOUS at 06:14

## 2019-03-08 RX ADMIN — HEPARIN SODIUM 5000 UNIT(S): 5000 INJECTION INTRAVENOUS; SUBCUTANEOUS at 21:04

## 2019-03-08 RX ADMIN — LOSARTAN POTASSIUM 25 MILLIGRAM(S): 100 TABLET, FILM COATED ORAL at 06:14

## 2019-03-08 RX ADMIN — OLANZAPINE 2.5 MILLIGRAM(S): 15 TABLET, FILM COATED ORAL at 17:34

## 2019-03-08 RX ADMIN — HEPARIN SODIUM 5000 UNIT(S): 5000 INJECTION INTRAVENOUS; SUBCUTANEOUS at 13:19

## 2019-03-08 RX ADMIN — GABAPENTIN 300 MILLIGRAM(S): 400 CAPSULE ORAL at 21:04

## 2019-03-08 RX ADMIN — OLANZAPINE 2.5 MILLIGRAM(S): 15 TABLET, FILM COATED ORAL at 06:14

## 2019-03-08 NOTE — PHYSICAL THERAPY INITIAL EVALUATION ADULT - SPECIFY REASON(S)
PT gave lengthy education to pt and family at b/s regarding benefits of OOB, role of b/s PT, and PT POC. Pt and family insist pt has too much L foot pain to tolerate OOB at this time. Pt demonstrates

## 2019-03-08 NOTE — PROGRESS NOTE ADULT - ASSESSMENT
KALI ANDERS 85y Female  MRN#: [id number]     SUBJECTIVE  Patient is a 85y old Female who presents with a chief complaint of Currently admitted to medicine with the primary diagnosis of left lower extremity swelling    Today is hospital day 3d, no acute events overnight. Resting comfortable in bed. No new requests. Pending PT eval     OBJECTIVE  Home Medications:  fentaNYL 12 mcg/hr transdermal film, extended release: 1 patch transdermal every 72 hours (08 Mar 2019 13:38)  valsartan 40 mg oral tablet: 40 milligram(s) orally once a day (05 Mar 2019 17:50)    MEDICATIONS:  STANDING MEDICATIONS  chlorhexidine 4% Liquid 1 Application(s) Topical <User Schedule>  collagenase Ointment 1 Application(s) Topical two times a day  docusate sodium 100 milliGRAM(s) Oral three times a day  fentaNYL   Patch  12 MICROgram(s)/Hr 1 Patch Transdermal every 72 hours  gabapentin 300 milliGRAM(s) Oral at bedtime  heparin  Injectable 5000 Unit(s) SubCutaneous every 8 hours  losartan 25 milliGRAM(s) Oral daily  OLANZapine 2.5 milliGRAM(s) Oral two times a day  senna 2 Tablet(s) Oral at bedtime    PRN MEDICATIONS      VITAL SIGNS: Last 24 Hours  T(C): 36.8 (08 Mar 2019 11:50), Max: 36.8 (08 Mar 2019 11:50)  T(F): 98.2 (08 Mar 2019 11:50), Max: 98.2 (08 Mar 2019 11:50)  HR: 87 (08 Mar 2019 11:50) (61 - 87)  BP: 136/61 (08 Mar 2019 11:50) (125/60 - 144/66)  BP(mean): --  RR: 18 (08 Mar 2019 11:50) (18 - 18)  SpO2: --    LABS:                        11.6   3.75  )-----------( 319      ( 08 Mar 2019 07:24 )             36.2     03-08    143  |  105  |  13  ----------------------------<  103<H>  4.6   |  26  |  0.7    Ca    9.1      08 Mar 2019 07:24  Mg     2.4     03-08    TPro  6.0  /  Alb  3.6  /  TBili  0.4  /  DBili  x   /  AST  13  /  ALT  7   /  AlkPhos  98  03-08    LIVER FUNCTIONS - ( 08 Mar 2019 07:24 )  Alb: 3.6 g/dL / Pro: 6.0 g/dL / ALK PHOS: 98 U/L / ALT: 7 U/L / AST: 13 U/L / GGT: x                     Culture - Blood (collected 05 Mar 2019 21:12)  Source: .Blood None  Preliminary Report (07 Mar 2019 14:01):    No growth to date.    Culture - Blood (collected 05 Mar 2019 21:12)  Source: .Blood None  Preliminary Report (07 Mar 2019 03:01):    No growth to date.          RADIOLOGY:  < from: Xray Foot AP + Lateral + Oblique, Left (03.05.19 @ 15:14) >  There is no evidence of acute fracture or dislocation. Diffuse   osteopenia. Scattered degenerative changes. Mild soft tissue swelling of   the foot, without any radiopaque foreign body.    Impression: No acute fracture or dislocation.      < end of copied text >    < from: VA Duplex Lower Ext Vein Scan, Bilat (03.05.19 @ 15:56) >  Impression:    No evidence of deep venous thrombosis or superficial thrombophlebitis in   the bilateral lower extremities.    < end of copied text >    < from: VA Duplex Lower Extrem Arterial, Bilat (03.05.19 @ 15:57) >  Impression:    Bilateral peripheral arterial occlusive disease. Moderate to high-grade   stenosis in the left superficial femoral artery.    < end of copied text >      ECHO- completed. results pending     PHYSICAL EXAM:    GENERAL: NAD, well-developed, AAOx3  HEENT:  Atraumatic, Normocephalic. EOMI, PERRLA no JVD  PULMONARY: Clear to auscultation bilaterally; No wheeze  CARDIOVASCULAR: Regular rate and rhythm; + holosystolic murmur  GASTROINTESTINAL: Soft, Nontender, Nondistended; Bowel sounds present  MUSCULOSKELETAL:  legs wrapped in b/l ace bandages from ankle to thigh (exam limited given that patient had dressing changed this morning and explained they were very painful). Based on previous pictures:  has large left medial ankle extending to posterior heel. base is clean granulating tissue, rolled borders without erythema, no pus.   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 Chance (as per son, gets Doxil every four weeks, last treatment was 2/21)   -  Arterial duplex positive for moderate to severe stenosis in left superficial femoral artery, no DVT  - Vascular planning for Angiogram next Tuesday (3/12)- if patient is discharged, she will still be able to keep this appointment and come back as an outpatient  -ID following: no antibiotics needed at this time  - FU Blood cultures  - Dressing changes as per podiatry- cw with Inés    #HTN  cw losartan   (takes valsartan at home)    #Dementia/ sundowning  - cw home meds- olanzapine     #Diet: DASH diet  #DVT ppx: heparin subq  # GI ppx: not indicated  #Dispo: from home, can likely go back home when medically stable  Pt/Rehab ordered on 3/7  Spoke with son Berry today. phone number: [phone]. Agreeable with plan that patient can be discharged home today or tomorrow if medically stable and follow up outpatient on Tuesday  Will be called on Monday by OR with information about where and when to arrive

## 2019-03-08 NOTE — PROGRESS NOTE ADULT - ASSESSMENT
KALI ANDERS 85y Female  MRN#: [id number]     SUBJECTIVE  Patient is a 85y old Female who presents with a chief complaint of Currently admitted to medicine with the primary diagnosis of left lower extremity swelling    Today is hospital day 2d, according to nursing staff, patient was sundowning overnight. Spoke to son this morning regarding this and he reported that she takes 5mg olanzapine for this.   Otherwise, patient appears comfortable, sitting up in chair at bedside. reporting adequate pain control, no CP, no SOB, tolerating PO diet.   As per vascular surgery team, patient is scheduled for angiogram on Tuesday 3/12    OBJECTIVE  Home Medications:  valsartan 40 mg oral tablet: 40 milligram(s) orally once a day (05 Mar 2019 17:50)    MEDICATIONS:  chlorhexidine 4% Liquid 1 Application(s) Topical <User Schedule>  collagenase Ointment 1 Application(s) Topical two times a day  docusate sodium 100 milliGRAM(s) Oral three times a day  fentaNYL   Patch  12 MICROgram(s)/Hr 1 Patch Transdermal every 72 hours  heparin  Injectable 5000 Unit(s) SubCutaneous every 8 hours  losartan 25 milliGRAM(s) Oral daily  senna 2 Tablet(s) Oral at bedtime    VITAL SIGNS: Last 24 Hours  T(C): 36.8 (08 Mar 2019 11:50), Max: 36.8 (08 Mar 2019 11:50)  T(F): 98.2 (08 Mar 2019 11:50), Max: 98.2 (08 Mar 2019 11:50)  HR: 87 (08 Mar 2019 11:50) (61 - 87)  BP: 136/61 (08 Mar 2019 11:50) (125/60 - 144/66)  RR: 18 (08 Mar 2019 11:50) (18 - 18)    LABS:                        11.6   3.84  )-----------( 284      ( 07 Mar 2019 06:27 )             36.0     03-07    140  |  102  |  11  ----------------------------<  97  4.4   |  26  |  0.6<L>    Ca    8.8      07 Mar 2019 06:27  Mg     2.2     03-07    TPro  6.1  /  Alb  3.5  /  TBili  0.5  /  DBili  x   /  AST  11  /  ALT  6   /  AlkPhos  98  03-07    LIVER FUNCTIONS - ( 07 Mar 2019 06:27 )  Alb: 3.5 g/dL / Pro: 6.1 g/dL / ALK PHOS: 98 U/L / ALT: 6 U/L / AST: 11 U/L / GGT: x           Culture - Blood (collected 05 Mar 2019 21:12)  Source: .Blood None  Preliminary Report (07 Mar 2019 03:01):  No growth to date.    RADIOLOGY:  < from: Xray Foot AP + Lateral + Oblique, Left (03.05.19 @ 15:14) >  There is no evidence of acute fracture or dislocation. Diffuse   osteopenia. Scattered degenerative changes. Mild soft tissue swelling of   the foot, without any radiopaque foreign body.    Impression: No acute fracture or dislocation.      < end of copied text >    < from: [facility] Duplex Lower Ext Vein Scan, Bilat (03.05.19 @ 15:56) >  Impression:    No evidence of deep venous thrombosis or superficial thrombophlebitis in   the bilateral lower extremities.    < end of copied text >    < from: VA Duplex Lower Extrem Arterial, Bilat (03.05.19 @ 15:57) >  Impression:    Bilateral peripheral arterial occlusive disease. Moderate to high-grade   stenosis in the left superficial femoral artery.    < end of copied text >    PHYSICAL EXAM:    GENERAL: NAD, well-developed, AAOx3  HEENT:  Atraumatic, Normocephalic. EOMI, PERRLA no JVD  PULMONARY: Clear to auscultation bilaterally; No wheeze  CARDIOVASCULAR: Regular rate and rhythm; + holosystolic murmur  GASTROINTESTINAL: Soft, Nontender, Nondistended; Bowel sounds present  MUSCULOSKELETAL:  legs wrapped in b/l ace bandages from ankle to thigh (exam limited given that patient had dressing changed this morning and explained they were very painful). Based on previous pictures:  has large left medial ankle extending to posterior heel. base is clean granulating tissue, rolled borders without erythema, no pus.   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 (as per son, gets Doxil every four weeks, last treatment was 2/21)   -  Arterial duplex positive for moderate to severe stenosis in left superficial femoral artery, no DVT  - Vascular planning for Angiogram next Tuesday (3/12)  -ID following: no antibiotics needed at this time  - FU Blood cultures  - Dressing changes as per podiatry- cw with Inés    #HTN  cw losartan   (takes valsartan at home)    #Dementia/ sundowning  - cw home meds- olanzapine     #Diet: DASH diet  #DVT ppx: heparin subq  # GI ppx: not indicated  #Dispo: from home, can likely go back home when medically stable  Pt/Rehab ordered on 3/7  Spoke with son Berry today. phone number: [phone]    Pt has been seen and examined. Case and Plan discussed with Team,  and wife at bedside. Pt and  prefers to involve their son Washington at [phone]. Pt is here for LLE Ulcer and found to have PVD as above. Per vascular pt will need an angiogram scheduled for Tuesday. Get PT evaluation for disposition. Otherwise continue with all care. Anticipate for possible d/c tomorrow and make vascular appointment. f/u TTE.    PT eval pending    #Progress Note Handoff  Pending  Consults:   Tests: Angiogram on Tuesday per Vascular  Test Results: TTE Report  Family Discussion: Family and pt want to pursue procedure  Future Disposition: Likely Home / Needs PT to eval.

## 2019-03-09 RX ORDER — ACETAMINOPHEN 500 MG
650 TABLET ORAL EVERY 8 HOURS
Qty: 0 | Refills: 0 | Status: DISCONTINUED | OUTPATIENT
Start: 2019-03-09 | End: 2019-03-12

## 2019-03-09 RX ADMIN — FENTANYL CITRATE 1 PATCH: 50 INJECTION INTRAVENOUS at 08:34

## 2019-03-09 RX ADMIN — OLANZAPINE 2.5 MILLIGRAM(S): 15 TABLET, FILM COATED ORAL at 05:32

## 2019-03-09 RX ADMIN — HEPARIN SODIUM 5000 UNIT(S): 5000 INJECTION INTRAVENOUS; SUBCUTANEOUS at 13:55

## 2019-03-09 RX ADMIN — Medication 100 MILLIGRAM(S): at 05:32

## 2019-03-09 RX ADMIN — Medication 1 APPLICATION(S): at 18:21

## 2019-03-09 RX ADMIN — Medication 100 MILLIGRAM(S): at 13:54

## 2019-03-09 RX ADMIN — FENTANYL CITRATE 1 PATCH: 50 INJECTION INTRAVENOUS at 18:58

## 2019-03-09 RX ADMIN — FENTANYL CITRATE 1 PATCH: 50 INJECTION INTRAVENOUS at 19:00

## 2019-03-09 RX ADMIN — LOSARTAN POTASSIUM 25 MILLIGRAM(S): 100 TABLET, FILM COATED ORAL at 05:32

## 2019-03-09 RX ADMIN — Medication 100 MILLIGRAM(S): at 21:28

## 2019-03-09 RX ADMIN — HEPARIN SODIUM 5000 UNIT(S): 5000 INJECTION INTRAVENOUS; SUBCUTANEOUS at 05:32

## 2019-03-09 RX ADMIN — Medication 1 APPLICATION(S): at 05:36

## 2019-03-09 RX ADMIN — CHLORHEXIDINE GLUCONATE 1 APPLICATION(S): 213 SOLUTION TOPICAL at 05:32

## 2019-03-09 RX ADMIN — OLANZAPINE 2.5 MILLIGRAM(S): 15 TABLET, FILM COATED ORAL at 18:21

## 2019-03-09 NOTE — PROGRESS NOTE ADULT - ASSESSMENT
KALI ANDERS 85y Female  MRN#: [id number]     SUBJECTIVE  Patient is a 85y old Female who presents with a chief complaint of Currently admitted to medicine with the primary diagnosis of left lower extremity swelling    Today is hospital day 4 Sundowning h/x dementia on Olanzepine at home resumed in hospital yesterday. This morning pleasant no complaints.  at bedside.  Pt evaluated pt and she is in too much pain to walk     OBJECTIVE  Home Medications:  valsartan 40 mg oral tablet: 40 milligram(s) orally once a day (05 Mar 2019 17:50)    MEDICATIONS:    MEDICATIONS  (STANDING):  chlorhexidine 4% Liquid 1 Application(s) Topical <User Schedule>  collagenase Ointment 1 Application(s) Topical two times a day  docusate sodium 100 milliGRAM(s) Oral three times a day  fentaNYL   Patch  12 MICROgram(s)/Hr 1 Patch Transdermal every 72 hours  gabapentin 300 milliGRAM(s) Oral at bedtime  heparin  Injectable 5000 Unit(s) SubCutaneous every 8 hours  losartan 25 milliGRAM(s) Oral daily  OLANZapine 2.5 milliGRAM(s) Oral two times a day  senna 2 Tablet(s) Oral at bedtime    VITAL SIGNS: Last 24 Hours    T(C): 36.6 (09 Mar 2019 04:50), Max: 37 (08 Mar 2019 21:23)  T(F): 97.8 (09 Mar 2019 04:50), Max: 98.6 (08 Mar 2019 21:23)  HR: 62 (09 Mar 2019 04:50) (62 - 87)  BP: 131/65 (09 Mar 2019 04:50) (131/65 - 141/67)  RR: 18 (09 Mar 2019 04:50) (18 - 18)    LABS:                         11.6   3.75  )-----------( 319      ( 08 Mar 2019 07:24 )             36.2       03-08    143  |  105  |  13  ----------------------------<  103<H>   4.6   |  26  |  0.7    Ca    9.1      08 Mar 2019 07:24  Mg     2.4     03-08    TPro  6.0  /  Alb  3.6  /  TBili  0.4  /  DBili  x   /  AST  13  /  ALT  7   /  AlkPhos  98  03-08               11.6   3.84  )-----------( 284      ( 07 Mar 2019 06:27 )             36.0     03-07    140  |  102  |  11  ----------------------------<  97  4.4   |  26  |  0.6<L>    Ca    8.8      07 Mar 2019 06:27  Mg     2.2     03-07    TPro  6.1  /  Alb  3.5  /  TBili  0.5  /  DBili  x   /  AST  11  /  ALT  6   /  AlkPhos  98  03-07    LIVER FUNCTIONS - ( 07 Mar 2019 06:27 )  Alb: 3.5 g/dL / Pro: 6.1 g/dL / ALK PHOS: 98 U/L / ALT: 6 U/L / AST: 11 U/L / GGT: x           Culture - Blood (collected 05 Mar 2019 21:12)  Source: .Blood None  Preliminary Report (07 Mar 2019 03:01):  No growth to date.    RADIOLOGY:  < from: Xray Foot AP + Lateral + Oblique, Left (03.05.19 @ 15:14) >  There is no evidence of acute fracture or dislocation. Diffuse   osteopenia. Scattered degenerative changes. Mild soft tissue swelling of   the foot, without any radiopaque foreign body.    Impression: No acute fracture or dislocation.      < end of copied text >    < from: VA Duplex Lower Ext Vein Scan, Bilat (03.05.19 @ 15:56) >  Impression:    No evidence of deep venous thrombosis or superficial thrombophlebitis in   the bilateral lower extremities.    < end of copied text >    < from: VA Duplex Lower Extrem Arterial, Bilat (03.05.19 @ 15:57) >  Impression:    Bilateral peripheral arterial occlusive disease. Moderate to high-grade   stenosis in the left superficial femoral artery.    < end of copied text >    PHYSICAL EXAM:    GENERAL: NAD, well-developed, AAOx3  HEENT:  Atraumatic, Normocephalic. EOMI, PERRLA no JVD  PULMONARY: Clear to auscultation bilaterally; No wheeze  CARDIOVASCULAR: Regular rate and rhythm; + holosystolic murmur  GASTROINTESTINAL: Soft, Nontender, Nondistended; Bowel sounds present  MUSCULOSKELETAL:  legs wrapped in b/l ace bandages from ankle to thigh (exam limited given that patient had dressing changed this morning and explained they were very painful). Based on previous pictures:  has large left medial ankle extending to posterior heel. base is clean granulating tissue, rolled borders without erythema, no pus.   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 (as per son, gets Doxil every four weeks, last treatment was 2/21)   -  Arterial duplex positive for moderate to severe stenosis in left superficial femoral artery, no DVT  - Vascular planning for Angiogram next Tuesday (3/12)  - ID following: no antibiotics needed at this time  - FU Blood cultures  - Dressing changes as per podiatry- cw with Inés    #HTN  cw losartan   (takes valsartan at home)    #Dementia/ sundowning  - cw home meds- olanzapine     #Diet: DASH diet  #DVT ppx: heparin subq  # GI ppx: not indicated  #Dispo: from home, can likely go back home when medically stable  Pt/Rehab ordered on 3/7  Spoke with son Berry today. phone number: [phone]    Pt has been seen and examined. Case and Plan discussed with Team,  and wife at bedside.   Pt and  prefers to involve their son Washington at [phone].   Pt is here for LLE Ulcer and found to have PVD as above. Per vascular pt will need an angiogram scheduled for Tuesday.  TTE EF wnl, mild AS   Will confirm with Vascular to schedule Angiogram on Tuesday  Pt with pain and inability to ambulate with PT 2/2 LLE Ulcer / PVD / Claudication   C/W Fentalyl Patch and add Tylenol 650mg po q8h ATC    #Progress Note Handoff  Pending  Consults:   Tests: Angiogram on Tuesday per Vascular  Test Results:   Family Discussion: Family and pt want to pursue procedure. Call [place] for any questions [phone]  Future Disposition: Likely Home / Needs PT to eval.

## 2019-03-09 NOTE — PROGRESS NOTE ADULT - ASSESSMENT
KALI ANDERS 85y Female  MRN#: [id number]         SUBJECTIVE  Patient is a 85y old Female who presents with a chief complaint of Currently admitted to medicine with the primary diagnosis of left lower extremity swelling    Today is hospital day 4d, no acute events overnight. Has pain associated with wounds, preventing her from participatin gin PT effectively but she is able to transition from bed to schair with assistance.   Decision made to keep patient in hospital until angiogram on Tuesday given that patient has difficulty walking and her  also has disabilities, believe it is safest to keep her in house until angiogrm.     OBJECTIVE  Home Medications:  fentaNYL 12 mcg/hr transdermal film, extended release: 1 patch transdermal every 72 hours (08 Mar 2019 13:38)  valsartan 40 mg oral tablet: 40 milligram(s) orally once a day (05 Mar 2019 17:50)    MEDICATIONS:  STANDING MEDICATIONS  chlorhexidine 4% Liquid 1 Application(s) Topical <User Schedule>  collagenase Ointment 1 Application(s) Topical two times a day  docusate sodium 100 milliGRAM(s) Oral three times a day  fentaNYL   Patch  12 MICROgram(s)/Hr 1 Patch Transdermal every 72 hours  gabapentin 300 milliGRAM(s) Oral at bedtime  heparin  Injectable 5000 Unit(s) SubCutaneous every 8 hours  losartan 25 milliGRAM(s) Oral daily  OLANZapine 2.5 milliGRAM(s) Oral two times a day  senna 2 Tablet(s) Oral at bedtime    PRN MEDICATIONS  acetaminophen   Tablet .. 650 milliGRAM(s) Oral every 8 hours PRN      VITAL SIGNS: Last 24 Hours  T(C): 36.6 (09 Mar 2019 04:50), Max: 37 (08 Mar 2019 21:23)  T(F): 97.8 (09 Mar 2019 04:50), Max: 98.6 (08 Mar 2019 21:23)  HR: 62 (09 Mar 2019 04:50) (62 - 87)  BP: 131/65 (09 Mar 2019 04:50) (131/65 - 141/67)  BP(mean): --  RR: 18 (09 Mar 2019 04:50) (18 - 18)  SpO2: --    LABS:                        11.6   3.75  )-----------( 319      ( 08 Mar 2019 07:24 )             36.2     03-08    143  |  105  |  13  ----------------------------<  103<H>  4.6   |  26  |  0.7    Ca    9.1      08 Mar 2019 07:24  Mg     2.4     03-08    TPro  6.0  /  Alb  3.6  /  TBili  0.4  /  DBili  x   /  AST  13  /  ALT  7   /  AlkPhos  98  03-08    LIVER FUNCTIONS - ( 08 Mar 2019 07:24 )  Alb: 3.6 g/dL / Pro: 6.0 g/dL / ALK PHOS: 98 U/L / ALT: 7 U/L / AST: 13 U/L / GGT: x             Culture - Blood (collected 05 Mar 2019 21:12)  Source: .Blood None  Preliminary Report (07 Mar 2019 14:01):    No growth to date.    Culture - Blood (collected 05 Mar 2019 21:12)  Source: .Blood None  Preliminary Report (07 Mar 2019 03:01):    No growth to date.                RADIOLOGY:  < from: Xray Foot AP + Lateral + Oblique, Left (03.05.19 @ 15:14) >  There is no evidence of acute fracture or dislocation. Diffuse   osteopenia. Scattered degenerative changes. Mild soft tissue swelling of   the foot, without any radiopaque foreign body.    Impression: No acute fracture or dislocation.      < end of copied text >    < from: VA Duplex Lower Ext Vein Scan, Bilat (03.05.19 @ 15:56) >  Impression:    No evidence of deep venous thrombosis or superficial thrombophlebitis in   the bilateral lower extremities.    < end of copied text >    < from: VA Duplex Lower Extrem Arterial, Bilat (03.05.19 @ 15:57) >  Impression:    Bilateral peripheral arterial occlusive disease. Moderate to high-grade   stenosis in the left superficial femoral artery.    < end of copied text >      < from: Transthoracic Echocardiogram (03.08.19 @ 08:47) >  Summary:   1. LV Ejection Fraction by Zavala's Method with a biplane EF of 58 %.   2. Normal left ventricular size and wall thicknesses, with normal   systolic function.   3. Spectral Doppler shows impaired relaxation pattern of left   ventricular myocardial filling (Grade I diastolic dysfunction).   4. Moderate mitral annular calcification.   5. Structurally normal mitral valve, with normal leaflet excursion.   6. Mild tricuspid regurgitation.   7. 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 MildAS by   doppler.   8. Pulmonic valve regurgitation.    < end of copied text >      PHYSICAL EXAM:    GENERAL: NAD, well-developed, AAOx3  HEENT:  Atraumatic, Normocephalic. EOMI, PERRLA no JVD  PULMONARY: Clear to auscultation bilaterally; No wheeze  CARDIOVASCULAR: Regular rate and rhythm; + holosystolic murmur  GASTROINTESTINAL: Soft, Nontender, Nondistended; Bowel sounds present  MUSCULOSKELETAL:  legs wrapped in b/l ace bandages from ankle to thigh (exam limited given that patient had dressing changed this morning and explained they were very painful). Based on previous pictures:  has large left medial ankle extending to posterior heel. base is clean granulating tissue, rolled borders without erythema, no pus.   No edema or erythema above ace-bandages. 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 (as per son, gets Doxil every four weeks, last treatment was 2/21)   -  Arterial duplex positive for moderate to severe stenosis in left superficial femoral artery, no DVT  - Vascular planning for Angiogram next Tuesday (3/12)   -ID following: no antibiotics needed at this time  - Dressing changes as per podiatry- cw with Santyl  - Pain control with fentanyl patch (home med) and tylenol PRN    #HTN  cw losartan   (takes valsartan at home)    #Dementia/ sundowning  - cw home meds- olanzapine     #Diet: DASH diet  #DVT ppx: heparin subq  # GI ppx: not indicated  #Dispo: from home, can likely go back home when medically stable  Pt/Rehab ordered on 3/7, not participating in PT on 3/8 due to pain in feet  decision made to keep patient in the hospital until angiogram on Tuesday given concern for safe discharge because patient has difficulty walking and her  has his own disabilities which would make it difficult for him to help her walk at home  Will have to reassess home care/ possible SNF placement after angiogram on Tuesday  Spoke with son Berry today. phone number: [phone]. Agreeable with plan

## 2019-03-10 LAB
BLD GP AB SCN SERPL QL: SIGNIFICANT CHANGE UP
TYPE + AB SCN PNL BLD: SIGNIFICANT CHANGE UP

## 2019-03-10 RX ADMIN — SENNA PLUS 2 TABLET(S): 8.6 TABLET ORAL at 21:55

## 2019-03-10 RX ADMIN — Medication 100 MILLIGRAM(S): at 21:55

## 2019-03-10 RX ADMIN — LOSARTAN POTASSIUM 25 MILLIGRAM(S): 100 TABLET, FILM COATED ORAL at 05:25

## 2019-03-10 RX ADMIN — HEPARIN SODIUM 5000 UNIT(S): 5000 INJECTION INTRAVENOUS; SUBCUTANEOUS at 05:25

## 2019-03-10 RX ADMIN — Medication 100 MILLIGRAM(S): at 14:29

## 2019-03-10 RX ADMIN — GABAPENTIN 300 MILLIGRAM(S): 400 CAPSULE ORAL at 21:55

## 2019-03-10 RX ADMIN — FENTANYL CITRATE 1 PATCH: 50 INJECTION INTRAVENOUS at 07:45

## 2019-03-10 RX ADMIN — OLANZAPINE 2.5 MILLIGRAM(S): 15 TABLET, FILM COATED ORAL at 05:24

## 2019-03-10 RX ADMIN — HEPARIN SODIUM 5000 UNIT(S): 5000 INJECTION INTRAVENOUS; SUBCUTANEOUS at 14:29

## 2019-03-10 RX ADMIN — Medication 650 MILLIGRAM(S): at 21:54

## 2019-03-10 RX ADMIN — Medication 100 MILLIGRAM(S): at 05:25

## 2019-03-10 RX ADMIN — Medication 1 APPLICATION(S): at 17:03

## 2019-03-10 RX ADMIN — OLANZAPINE 2.5 MILLIGRAM(S): 15 TABLET, FILM COATED ORAL at 17:03

## 2019-03-10 RX ADMIN — Medication 650 MILLIGRAM(S): at 22:53

## 2019-03-10 RX ADMIN — CHLORHEXIDINE GLUCONATE 1 APPLICATION(S): 213 SOLUTION TOPICAL at 05:26

## 2019-03-10 RX ADMIN — HEPARIN SODIUM 5000 UNIT(S): 5000 INJECTION INTRAVENOUS; SUBCUTANEOUS at 21:55

## 2019-03-10 RX ADMIN — FENTANYL CITRATE 1 PATCH: 50 INJECTION INTRAVENOUS at 19:24

## 2019-03-10 NOTE — PROGRESS NOTE ADULT - ASSESSMENT
KALI ANDERS 85y Female  MRN#: [id number]     SUBJECTIVE  Patient is a 85y old Female who presents with a chief complaint of Currently admitted to medicine with the primary diagnosis of left lower extremity swelling    Today is hospital day 5  This morning pleasant but complains of leg pain if she walks.  at bedside.  Pt evaluated pt and she is in too much pain to walk     OBJECTIVE  Home Medications:  valsartan 40 mg oral tablet: 40 milligram(s) orally once a day (05 Mar 2019 17:50)    MEDICATIONS:    MEDICATIONS  (STANDING):  chlorhexidine 4% Liquid 1 Application(s) Topical <User Schedule>  collagenase Ointment 1 Application(s) Topical two times a day  docusate sodium 100 milliGRAM(s) Oral three times a day  fentaNYL   Patch  12 MICROgram(s)/Hr 1 Patch Transdermal every 72 hours  gabapentin 300 milliGRAM(s) Oral at bedtime  heparin  Injectable 5000 Unit(s) SubCutaneous every 8 hours  losartan 25 milliGRAM(s) Oral daily  OLANZapine 2.5 milliGRAM(s) Oral two times a day  senna 2 Tablet(s) Oral at bedtime    MEDICATIONS  (PRN):  acetaminophen   Tablet .. 650 milliGRAM(s) Oral every 8 hours PRN Mild Pain (1 - 3)      VITAL SIGNS: Last 24 Hours    T(C): 36.2 (10 Mar 2019 05:02), Max: 37 (09 Mar 2019 13:50)  T(F): 97.1 (10 Mar 2019 05:02), Max: 98.6 (09 Mar 2019 13:50)  HR: 65 (10 Mar 2019 05:02) (65 - 75)  BP: 161/69 (10 Mar 2019 05:02) (131/73 - 163/77)  RR: 18 (10 Mar 2019 05:02) (18 - 18)      LABS: no new labs                        11.6   3.75  )-----------( 319      ( 08 Mar 2019 07:24 )             36.2       03-08    143  |  105  |  13  ----------------------------<  103<H>   4.6   |  26  |  0.7    Ca    9.1      08 Mar 2019 07:24  Mg     2.4     03-08    TPro  6.0  /  Alb  3.6  /  TBili  0.4  /  DBili  x   /  AST  13  /  ALT  7   /  AlkPhos  98  03-08               11.6   3.84  )-----------( 284      ( 07 Mar 2019 06:27 )             36.0     03-07    140  |  102  |  11  ----------------------------<  97  4.4   |  26  |  0.6<L>    Ca    8.8      07 Mar 2019 06:27  Mg     2.2     03-07    TPro  6.1  /  Alb  3.5  /  TBili  0.5  /  DBili  x   /  AST  11  /  ALT  6   /  AlkPhos  98  03-07    LIVER FUNCTIONS - ( 07 Mar 2019 06:27 )  Alb: 3.5 g/dL / Pro: 6.1 g/dL / ALK PHOS: 98 U/L / ALT: 6 U/L / AST: 11 U/L / GGT: x           Culture - Blood (collected 05 Mar 2019 21:12)  Source: .Blood None  Preliminary Report (07 Mar 2019 03:01):  No growth to date.    RADIOLOGY:  < from: Xray Foot AP + Lateral + Oblique, Left (03.05.19 @ 15:14) >  There is no evidence of acute fracture or dislocation. Diffuse   osteopenia. Scattered degenerative changes. Mild soft tissue swelling of   the foot, without any radiopaque foreign body.    Impression: No acute fracture or dislocation.      < end of copied text >    < from: VA Duplex Lower Ext Vein Scan, Bilat (03.05.19 @ 15:56) >  Impression:    No evidence of deep venous thrombosis or superficial thrombophlebitis in   the bilateral lower extremities.    < end of copied text >    < from: VA Duplex Lower Extrem Arterial, Bilat (03.05.19 @ 15:57) >  Impression:    Bilateral peripheral arterial occlusive disease. Moderate to high-grade   stenosis in the left superficial femoral artery.    < end of copied text >    PHYSICAL EXAM:    GENERAL: NAD, well-developed, AAOx3  HEENT:  Atraumatic, Normocephalic. EOMI, PERRLA no JVD  PULMONARY: Clear to auscultation bilaterally; No wheeze  CARDIOVASCULAR: Regular rate and rhythm; + holosystolic murmur  GASTROINTESTINAL: Soft, Nontender, Nondistended; Bowel sounds present  MUSCULOSKELETAL:  legs wrapped in b/l ace bandages from ankle to thigh (exam limited given that patient had dressing changed this morning and explained they were very painful). Based on previous pictures:  has large left medial ankle extending to posterior heel. base is clean granulating tissue, rolled borders without erythema, no pus.   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 Chance (as per son, gets Doxil every four weeks, last treatment was 2/21)   -  Arterial duplex positive for moderate to severe stenosis in left superficial femoral artery, no DVT  - Vascular planning for Angiogram next Tuesday (3/12)  - ID following: no antibiotics needed at this time  - FU Blood cultures  - Dressing changes as per podiatry- cw with Inés    #HTN  cw losartan   (takes valsartan at home)    #Dementia/ sundowning  - cw home meds- olanzapine     #Diet: DASH diet  #DVT ppx: heparin subq  # GI ppx: not indicated  #Dispo: from home, can likely go back home when medically stable  Pt/Rehab ordered on 3/7  Spoke with son Berry today. phone number: [phone]    #Progress Note Handoff  Pending  Consults:   Tests: Angiogram on Tuesday per Vascular  Test Results:   Family Discussion: Family and pt want to pursue procedure. Case d/w [place] (son) who agrees with current plan. Pt and  also in agreement. [place] # [phone]  Future Disposition: Possible SNF

## 2019-03-11 LAB
ANION GAP SERPL CALC-SCNC: 11 MMOL/L — SIGNIFICANT CHANGE UP (ref 7–14)
APTT BLD: 34.5 SEC — SIGNIFICANT CHANGE UP (ref 27–39.2)
BUN SERPL-MCNC: 16 MG/DL — SIGNIFICANT CHANGE UP (ref 10–20)
CALCIUM SERPL-MCNC: 9.2 MG/DL — SIGNIFICANT CHANGE UP (ref 8.5–10.1)
CHLORIDE SERPL-SCNC: 103 MMOL/L — SIGNIFICANT CHANGE UP (ref 98–110)
CO2 SERPL-SCNC: 27 MMOL/L — SIGNIFICANT CHANGE UP (ref 17–32)
CREAT SERPL-MCNC: 0.7 MG/DL — SIGNIFICANT CHANGE UP (ref 0.7–1.5)
CULTURE RESULTS: SIGNIFICANT CHANGE UP
CULTURE RESULTS: SIGNIFICANT CHANGE UP
GLUCOSE SERPL-MCNC: 101 MG/DL — HIGH (ref 70–99)
HCT VFR BLD CALC: 37.9 % — SIGNIFICANT CHANGE UP (ref 37–47)
HGB BLD-MCNC: 12 G/DL — SIGNIFICANT CHANGE UP (ref 12–16)
INR BLD: 1.04 RATIO — SIGNIFICANT CHANGE UP (ref 0.65–1.3)
MAGNESIUM SERPL-MCNC: 2.4 MG/DL — SIGNIFICANT CHANGE UP (ref 1.8–2.4)
MCHC RBC-ENTMCNC: 30.2 PG — SIGNIFICANT CHANGE UP (ref 27–31)
MCHC RBC-ENTMCNC: 31.7 G/DL — LOW (ref 32–37)
MCV RBC AUTO: 95.2 FL — SIGNIFICANT CHANGE UP (ref 81–99)
NRBC # BLD: 0 /100 WBCS — SIGNIFICANT CHANGE UP (ref 0–0)
PLATELET # BLD AUTO: 279 K/UL — SIGNIFICANT CHANGE UP (ref 130–400)
POTASSIUM SERPL-MCNC: 4.9 MMOL/L — SIGNIFICANT CHANGE UP (ref 3.5–5)
POTASSIUM SERPL-SCNC: 4.9 MMOL/L — SIGNIFICANT CHANGE UP (ref 3.5–5)
PROTHROM AB SERPL-ACNC: 11.9 SEC — SIGNIFICANT CHANGE UP (ref 9.95–12.87)
RBC # BLD: 3.98 M/UL — LOW (ref 4.2–5.4)
RBC # FLD: 14.4 % — SIGNIFICANT CHANGE UP (ref 11.5–14.5)
SODIUM SERPL-SCNC: 141 MMOL/L — SIGNIFICANT CHANGE UP (ref 135–146)
SPECIMEN SOURCE: SIGNIFICANT CHANGE UP
SPECIMEN SOURCE: SIGNIFICANT CHANGE UP
WBC # BLD: 3.38 K/UL — LOW (ref 4.8–10.8)
WBC # FLD AUTO: 3.38 K/UL — LOW (ref 4.8–10.8)

## 2019-03-11 RX ADMIN — LOSARTAN POTASSIUM 25 MILLIGRAM(S): 100 TABLET, FILM COATED ORAL at 05:55

## 2019-03-11 RX ADMIN — Medication 1 APPLICATION(S): at 17:22

## 2019-03-11 RX ADMIN — HEPARIN SODIUM 5000 UNIT(S): 5000 INJECTION INTRAVENOUS; SUBCUTANEOUS at 13:19

## 2019-03-11 RX ADMIN — Medication 1 APPLICATION(S): at 05:55

## 2019-03-11 RX ADMIN — GABAPENTIN 300 MILLIGRAM(S): 400 CAPSULE ORAL at 21:15

## 2019-03-11 RX ADMIN — Medication 100 MILLIGRAM(S): at 05:55

## 2019-03-11 RX ADMIN — FENTANYL CITRATE 1 PATCH: 50 INJECTION INTRAVENOUS at 07:49

## 2019-03-11 RX ADMIN — OLANZAPINE 2.5 MILLIGRAM(S): 15 TABLET, FILM COATED ORAL at 17:22

## 2019-03-11 RX ADMIN — Medication 100 MILLIGRAM(S): at 13:19

## 2019-03-11 RX ADMIN — SENNA PLUS 2 TABLET(S): 8.6 TABLET ORAL at 21:16

## 2019-03-11 RX ADMIN — HEPARIN SODIUM 5000 UNIT(S): 5000 INJECTION INTRAVENOUS; SUBCUTANEOUS at 05:55

## 2019-03-11 RX ADMIN — OLANZAPINE 2.5 MILLIGRAM(S): 15 TABLET, FILM COATED ORAL at 05:55

## 2019-03-11 RX ADMIN — CHLORHEXIDINE GLUCONATE 1 APPLICATION(S): 213 SOLUTION TOPICAL at 05:55

## 2019-03-11 RX ADMIN — HEPARIN SODIUM 5000 UNIT(S): 5000 INJECTION INTRAVENOUS; SUBCUTANEOUS at 21:16

## 2019-03-11 RX ADMIN — FENTANYL CITRATE 1 PATCH: 50 INJECTION INTRAVENOUS at 21:17

## 2019-03-11 RX ADMIN — Medication 100 MILLIGRAM(S): at 21:15

## 2019-03-11 NOTE — CHART NOTE - NSCHARTNOTEFT_GEN_A_CORE
Patient: KALI ANDERS  MRN: [id number]    Vitals:  T(F): 96.2 (03-11-19 @ 05:19), Max: 98.5 (03-10-19 @ 20:10)  HR: 57 (03-11-19 @ 05:19) (57 - 65)  BP: 119/59 (03-11-19 @ 05:19) (119/59 - 136/61)  RR: 18 (03-11-19 @ 05:19) (18 - 18)  SpO2: 96% (03-11-19 @ 08:43) (96% - 96%)    Procedure: Left lower extremity angiogram  Diet: Diet, NPO after Midnight:      NPO Start Date: 11-Mar-2019,   NPO Start Time: 23:59 (03-11-19 @ 09:22)    EKG: pending  CXR: pending    Pre-OP Labs:  CAPILLARY BLOOD GLUCOSE                              12.0   3.38  )-----------( 279      ( 11 Mar 2019 07:08 )             37.9     03-11    141  |  103  |  16  ----------------------------<  101<H>  4.9   |  27  |  0.7    Ca    9.2      11 Mar 2019 07:08  Mg     2.4     03-11      PT/INR - ( 11 Mar 2019 07:08 )   PT: 11.90 sec;   INR: 1.04 ratio    PTT - ( 11 Mar 2019 07:08 )  PTT:34.5 sec    Type & Screen: active from 3/10/2019    Anticoagulation/Antiplatelet: None        PLAN:  Plan for LLE angiogram tomorrow in OR  Preoperative labs, active type and screen  Pending CXR and EKG  Please keep patient NPO after midnight

## 2019-03-11 NOTE — H&P ADULT - PMH
Subjective   Colleen Shanks is a 36 y.o.female who presents to the ED with complaints of left sided lower back pain. The patient reports her pain has been constant since it onset three days ago. She has taken Ibuprofen and Tylenol, which have provided mild relief. She also complains of nausea, abdominal swelling, and an inability to urinate, but she has not experienced any fever, constipation, chest pain, or shortness of breath. Additionally, she has a history of urinary tract infections and a cholecystectomy. There are no other complaints at this time.         History provided by:  Patient  Back Pain   Pain location: left sided lumbar region.  Quality:  Aching  Radiates to:  Does not radiate  Pain severity:  Moderate  Pain is:  Same all the time  Onset quality:  Sudden  Duration:  4 days  Timing:  Constant  Progression:  Unchanged  Chronicity:  New  Relieved by: Ibuprofen and Tylenol.  Worsened by:  Nothing  Associated symptoms: no chest pain and no fever        Review of Systems   Constitutional: Negative for fever.   Respiratory: Negative for shortness of breath.    Cardiovascular: Negative for chest pain.   Gastrointestinal: Positive for abdominal distention and nausea. Negative for constipation.   Genitourinary: Positive for difficulty urinating.   Musculoskeletal: Positive for back pain.   All other systems reviewed and are negative.      Past Medical History:   Diagnosis Date   • Otitis externa    • Serous otitis media    • UTI (urinary tract infection)        Allergies   Allergen Reactions   • Macrobid [Nitrofurantoin Monohyd Macro]    • Nitrofurantoin Rash       Past Surgical History:   Procedure Laterality Date   • CHOLECYSTECTOMY     • OTHER SURGICAL HISTORY      UTERINE BIOPSY        Family History   Problem Relation Age of Onset   • Diabetes Father        Social History     Socioeconomic History   • Marital status:      Spouse name: Not on file   • Number of children: Not on file   • Years of  education: Not on file   • Highest education level: Not on file   Tobacco Use   • Smoking status: Never Smoker   • Smokeless tobacco: Never Used   Substance and Sexual Activity   • Alcohol use: Yes     Comment: on occasion   • Drug use: No   • Sexual activity: Defer         Objective   Physical Exam   Constitutional: She is oriented to person, place, and time. She appears well-developed and well-nourished. No distress.   HENT:   Head: Normocephalic and atraumatic.   Nose: Nose normal.   Eyes: Conjunctivae are normal. No scleral icterus.   Neck: Normal range of motion. Neck supple.   Cardiovascular: Normal rate, regular rhythm, normal heart sounds and intact distal pulses.   No murmur heard.  Pulmonary/Chest: Effort normal and breath sounds normal. No respiratory distress.   Abdominal: Soft. Bowel sounds are normal. There is tenderness in the right lower quadrant, suprapubic area, left upper quadrant and left lower quadrant.   Tenderness to percussion of left flank. Severe tenderness to palpation of the suprapubic region. Moderate tenderness to the bilateral lower quadrants and the left upper quadrant.   Musculoskeletal: Normal range of motion. She exhibits no edema.   Neurological: She is alert and oriented to person, place, and time.   Skin: Skin is warm and dry.   Psychiatric: She has a normal mood and affect. Her behavior is normal.   Nursing note and vitals reviewed.      Procedures         ED Course     Recent Results (from the past 24 hour(s))   Comprehensive Metabolic Panel    Collection Time: 03/11/19  7:37 AM   Result Value Ref Range    Glucose 93 70 - 100 mg/dL    BUN 11 9 - 23 mg/dL    Creatinine 0.84 0.60 - 1.30 mg/dL    Sodium 137 132 - 146 mmol/L    Potassium 3.9 3.5 - 5.5 mmol/L    Chloride 108 99 - 109 mmol/L    CO2 23.0 20.0 - 31.0 mmol/L    Calcium 9.0 8.7 - 10.4 mg/dL    Total Protein 7.3 5.7 - 8.2 g/dL    Albumin 4.28 3.20 - 4.80 g/dL    ALT (SGPT) 28 7 - 40 U/L    AST (SGOT) 26 0 - 33 U/L     Alkaline Phosphatase 97 25 - 100 U/L    Total Bilirubin 0.3 0.3 - 1.2 mg/dL    eGFR Non African Amer 77 >60 mL/min/1.73    Globulin 3.0 gm/dL    A/G Ratio 1.4 (L) 1.5 - 2.5 g/dL    BUN/Creatinine Ratio 13.1 7.0 - 25.0    Anion Gap 6.0 3.0 - 11.0 mmol/L   Lipase    Collection Time: 03/11/19  7:37 AM   Result Value Ref Range    Lipase 43 6 - 51 U/L   CBC Auto Differential    Collection Time: 03/11/19  7:37 AM   Result Value Ref Range    WBC 9.03 3.50 - 10.80 10*3/mm3    RBC 4.78 3.89 - 5.14 10*6/mm3    Hemoglobin 11.4 (L) 11.5 - 15.5 g/dL    Hematocrit 36.8 34.5 - 44.0 %    MCV 77.0 (L) 80.0 - 99.0 fL    MCH 23.8 (L) 27.0 - 31.0 pg    MCHC 31.0 (L) 32.0 - 36.0 g/dL    RDW 14.4 11.3 - 14.5 %    RDW-SD 40.8 37.0 - 54.0 fl    MPV 10.3 6.0 - 12.0 fL    Platelets 297 150 - 450 10*3/mm3    Neutrophil % 64.8 41.0 - 71.0 %    Lymphocyte % 25.2 24.0 - 44.0 %    Monocyte % 8.7 0.0 - 12.0 %    Eosinophil % 1.1 0.0 - 3.0 %    Basophil % 0.2 0.0 - 1.0 %    Immature Grans % 0.8 (H) 0.0 - 0.6 %    Neutrophils, Absolute 5.84 1.50 - 8.30 10*3/mm3    Lymphocytes, Absolute 2.28 0.60 - 4.80 10*3/mm3    Monocytes, Absolute 0.79 0.00 - 1.00 10*3/mm3    Eosinophils, Absolute 0.10 0.00 - 0.30 10*3/mm3    Basophils, Absolute 0.02 0.00 - 0.20 10*3/mm3    Immature Grans, Absolute 0.07 (H) 0.00 - 0.05 10*3/mm3   Urinalysis With Culture If Indicated - Urine, Clean Catch    Collection Time: 03/11/19  7:38 AM   Result Value Ref Range    Color, UA Yellow Yellow, Straw    Appearance, UA Cloudy (A) Clear    pH, UA 5.5 5.0 - 8.0    Specific Gravity, UA 1.020 1.001 - 1.030    Glucose, UA Negative Negative    Ketones, UA Negative Negative    Bilirubin, UA Negative Negative    Blood, UA Negative Negative    Protein, UA Negative Negative    Leuk Esterase, UA Moderate (2+) (A) Negative    Nitrite, UA Negative Negative    Urobilinogen, UA 0.2 E.U./dL 0.2 - 1.0 E.U./dL   Urinalysis, Microscopic Only - Urine, Clean Catch    Collection Time: 03/11/19  7:38 AM  HTN (hypertension)    Leg swelling    Skin ulcer of left ankle, limited to breakdown of skin   Result Value Ref Range    RBC, UA 3-6 (A) None Seen, 0-2 /HPF    WBC, UA 13-20 (A) None Seen, 0-2 /HPF    Bacteria, UA 2+ (A) None Seen, Trace /HPF    Squamous Epithelial Cells, UA 21-30 (A) None Seen, 0-2 /HPF    Hyaline Casts, UA Unable to determine due to loaded field 0 - 6 /LPF    Methodology Manual Light Microscopy    POCT Pregnancy, urine    Collection Time: 03/11/19  7:43 AM   Result Value Ref Range    HCG, Urine, QL Negative Negative    Lot Number [id number]     Internal Positive Control Positive     Internal Negative Control Negative      Note: In addition to lab results from this visit, the labs listed above may include labs taken at another facility or during a different encounter within the last 24 hours. Please correlate lab times with ED admission and discharge times for further clarification of the services performed during this visit.    CT Abdomen Pelvis Without Contrast   Final Result   Prominence identified of the terminal ileum.  Mild wall   thickening and surrounding stranding cannot be excluded in which an   early inflammatory process is a possibility. Findings may also suggest a   mild enteritis. Clinical correlation is needed.       D:  03/11/2019   E:  03/11/2019       This report was finalized on 3/11/2019 9:09 AM by Dr. Cristal Maguire MD.            Vitals:    03/11/19 0800 03/11/19 0851 03/11/19 1000 03/11/19 1100   BP: 134/86 152/77 180/87 128/78   Patient Position:  Sitting     Pulse:  87     Resp:  17     Temp:       TempSrc:       SpO2: 96% 98% 99% 96%   Weight:       Height:         Medications   sodium chloride 0.9 % bolus 1,000 mL (0 mL Intravenous Stopped 3/11/19 0918)   Morphine sulfate (PF) injection 4 mg (4 mg Intravenous Given 3/11/19 0754)   ondansetron (ZOFRAN) injection 4 mg (4 mg Intravenous Given 3/11/19 0751)   cefTRIAXone (ROCEPHIN) IVPB 1 g (0 g Intravenous Stopped 3/11/19 0918)   ondansetron (ZOFRAN) injection 4 mg (4 mg Intravenous Given 3/11/19 0845)    Morphine sulfate (PF) injection 4 mg (4 mg Intravenous Given 3/11/19 1027)   sodium chloride 0.9 % bolus 1,000 mL (0 mL Intravenous Stopped 3/11/19 1115)   promethazine (PHENERGAN) injection 12.5 mg (12.5 mg Intravenous Given 3/11/19 1020)     ECG/EMG Results (last 24 hours)     ** No results found for the last 24 hours. **        No orders to display                       MDM  Number of Diagnoses or Management Options  Terminal ileitis without complication (CMS/HCC): new and requires workup  Urinary tract infection without hematuria, site unspecified: new and requires workup  Diagnosis management comments: CT scan shows signs of terminal ileitis.  No other acute laboratory or imaging abnormality identified.    Patient on recheck was still vomiting, therefore repeat dose of Zofran, and repeat dose of morphine will be administered.    Patient ultimately will be discharged with Zofran, and Lortab.    She will be advised to follow-up her primary care physician for referral to a GI physician to further rule out Crohn's with outpatient evaluation.        Amount and/or Complexity of Data Reviewed  Clinical lab tests: ordered and reviewed  Tests in the radiology section of CPT®: ordered and reviewed  Decide to obtain previous medical records or to obtain history from someone other than the patient: yes  Review and summarize past medical records: yes  Independent visualization of images, tracings, or specimens: yes        Final diagnoses:   Terminal ileitis without complication (CMS/HCC)   Urinary tract infection without hematuria, site unspecified       Documentation assistance provided by geraldo Huber.  Information recorded by the geraldo was done at my direction and has been verified and validated by me.     Benji Huber  03/11/19 0749       Benji Huber  03/11/19 0842       Mynor Ellis MD  03/11/19 8950

## 2019-03-11 NOTE — PROGRESS NOTE ADULT - ASSESSMENT
KALI ANDERS 85y Female  MRN#: [id number]       SUBJECTIVE  Patient is a 85y old Female who presents with a chief complaint of Currently admitted to medicine with the primary diagnosis of left lower extremity swelling     Today is hospital day 6d, no acute events overnight. Continues to have pain in her legs b/l, relieved with current pain regimen.     OBJECTIVE  Home Medications:  fentaNYL 12 mcg/hr transdermal film, extended release: 1 patch transdermal every 72 hours (08 Mar 2019 13:38)  valsartan 40 mg oral tablet: 40 milligram(s) orally once a day (05 Mar 2019 17:50)    MEDICATIONS:  STANDING MEDICATIONS  chlorhexidine 4% Liquid 1 Application(s) Topical <User Schedule>  collagenase Ointment 1 Application(s) Topical two times a day  docusate sodium 100 milliGRAM(s) Oral three times a day  fentaNYL   Patch  12 MICROgram(s)/Hr 1 Patch Transdermal every 72 hours  gabapentin 300 milliGRAM(s) Oral at bedtime  heparin  Injectable 5000 Unit(s) SubCutaneous every 8 hours  losartan 25 milliGRAM(s) Oral daily  OLANZapine 2.5 milliGRAM(s) Oral two times a day  senna 2 Tablet(s) Oral at bedtime    PRN MEDICATIONS  acetaminophen   Tablet .. 650 milliGRAM(s) Oral every 8 hours PRN      VITAL SIGNS: Last 24 Hours  T(C): 35.7 (11 Mar 2019 05:19), Max: 36.9 (10 Mar 2019 20:10)  T(F): 96.2 (11 Mar 2019 05:19), Max: 98.5 (10 Mar 2019 20:10)  HR: 57 (11 Mar 2019 05:19) (57 - 65)  BP: 119/59 (11 Mar 2019 05:19) (119/59 - 136/61)  BP(mean): --  RR: 18 (11 Mar 2019 05:19) (18 - 18)  SpO2: 96% (11 Mar 2019 08:43) (96% - 96%)    LABS:                        12.0   3.38  )-----------( 279      ( 11 Mar 2019 07:08 )             37.9     03-11    141  |  103  |  16  ----------------------------<  101<H>  4.9   |  27  |  0.7    Ca    9.2      11 Mar 2019 07:08  Mg     2.4     03-11        PT/INR - ( 11 Mar 2019 07:08 )   PT: 11.90 sec;   INR: 1.04 ratio         PTT - ( 11 Mar 2019 07:08 )  PTT:34.5 sec      Culture - Blood (03.05.19 @ 21:12)    Specimen Source: .Blood None    Culture Results:   No growth to date.        RADIOLOGY:  < from: Xray Foot AP + Lateral + Oblique, Left (03.05.19 @ 15:14) >  There is no evidence of acute fracture or dislocation. Diffuse   osteopenia. Scattered degenerative changes. Mild soft tissue swelling of   the foot, without any radiopaque foreign body.    Impression: No acute fracture or dislocation.      < end of copied text >    < from: VA Duplex Lower Ext Vein Scan, Bilat (03.05.19 @ 15:56) >  Impression:    No evidence of deep venous thrombosis or superficial thrombophlebitis in   the bilateral lower extremities.    < end of copied text >    < from: VA Duplex Lower Extrem Arterial, Bilat (03.05.19 @ 15:57) >  Impression:    Bilateral peripheral arterial occlusive disease. Moderate to high-grade   stenosis in the left superficial femoral artery.    < end of copied text >      < from: Transthoracic Echocardiogram (03.08.19 @ 08:47) >  Summary:   1. LV Ejection Fraction by Zavala's Method with a biplane EF of 58 %.   2. Normal left ventricular size and wall thicknesses, with normal   systolic function.   3. Spectral Doppler shows impaired relaxation pattern of left   ventricular myocardial filling (Grade I diastolic dysfunction).   4. Moderate mitral annular calcification.   5. Structurally normal mitral valve, with normal leaflet excursion.   6. Mild tricuspid regurgitation.   7. 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 MildAS by   doppler.   8. Pulmonic valve regurgitation.    < end of copied text >      PHYSICAL EXAM:    GENERAL: NAD, well-developed, AAOx3  HEENT:  Atraumatic, Normocephalic. EOMI, PERRLA no JVD  PULMONARY: Clear to auscultation bilaterally; No wheeze  CARDIOVASCULAR: Regular rate and rhythm; + holosystolic murmur  GASTROINTESTINAL: Soft, Nontender, Nondistended; Bowel sounds present  MUSCULOSKELETAL:  legs wrapped in b/l ace bandages from ankle to thigh. Knees and upper thighs are non-edematous, non erythematous + tenderness to palpation on left side under knee.  Based on previous pictures:  has large left medial ankle extending to posterior heel. base is clean granulating tissue, rolled borders without erythema, no pus.   No edema or erythema above ace-bandages. 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 Chance (as per son, gets Doxil every four weeks, last treatment was 2/21)   -  Arterial duplex positive for moderate to severe stenosis in left superficial femoral artery, no DVT  - Vascular planning for Angiogram on Tuesday (3/12)   -ID following: no antibiotics needed at this time  - Dressing changes as per podiatry- cw with Santyl  - Pain control with fentanyl patch (home med) and tylenol PRN    #HTN  cw losartan   (takes valsartan at home)    #Dementia/ sundowning  - cw home meds- olanzapine     #Diet: DASH diet  #DVT ppx: heparin subq  # GI ppx: not indicated  #Dispo: from home, can likely go back home when medically stable  Pt/Rehab ordered on 3/7, not participating in PT on 3/8 due to pain in feet  Keeping patient in house at least until Angiogram on tuesday given she has difficulty walking and her  will have difficulty taking care of her and helping her ambulate considering he has some disabilities himself.   Son Berry, preferred contact. phone number: [phone]. Agreeable with plan KALI ANDERS 85y Female  MRN#: [id number]       SUBJECTIVE  Patient is a 85y old Female who presents with a chief complaint of Currently admitted to medicine with the primary diagnosis of left lower extremity swelling     Today is hospital day 6d, no acute events overnight. Continues to have pain in her legs b/l, relieved with current pain regimen.     OBJECTIVE  Home Medications:  fentaNYL 12 mcg/hr transdermal film, extended release: 1 patch transdermal every 72 hours (08 Mar 2019 13:38)  valsartan 40 mg oral tablet: 40 milligram(s) orally once a day (05 Mar 2019 17:50)    MEDICATIONS:  STANDING MEDICATIONS  chlorhexidine 4% Liquid 1 Application(s) Topical <User Schedule>  collagenase Ointment 1 Application(s) Topical two times a day  docusate sodium 100 milliGRAM(s) Oral three times a day  fentaNYL   Patch  12 MICROgram(s)/Hr 1 Patch Transdermal every 72 hours  gabapentin 300 milliGRAM(s) Oral at bedtime  heparin  Injectable 5000 Unit(s) SubCutaneous every 8 hours  losartan 25 milliGRAM(s) Oral daily  OLANZapine 2.5 milliGRAM(s) Oral two times a day  senna 2 Tablet(s) Oral at bedtime    PRN MEDICATIONS  acetaminophen   Tablet .. 650 milliGRAM(s) Oral every 8 hours PRN      VITAL SIGNS: Last 24 Hours  T(C): 35.7 (11 Mar 2019 05:19), Max: 36.9 (10 Mar 2019 20:10)  T(F): 96.2 (11 Mar 2019 05:19), Max: 98.5 (10 Mar 2019 20:10)  HR: 57 (11 Mar 2019 05:19) (57 - 65)  BP: 119/59 (11 Mar 2019 05:19) (119/59 - 136/61)  RR: 18 (11 Mar 2019 05:19) (18 - 18)  SpO2: 96% (11 Mar 2019 08:43) (96% - 96%)    LABS:                        12.0   3.38  )-----------( 279      ( 11 Mar 2019 07:08 )             37.9     03-11    141  |  103  |  16  ----------------------------<  101<H>  4.9   |  27  |  0.7    Ca    9.2      11 Mar 2019 07:08  Mg     2.4     03-11      PT/INR - ( 11 Mar 2019 07:08 )   PT: 11.90 sec;   INR: 1.04 ratio         PTT - ( 11 Mar 2019 07:08 )  PTT:34.5 sec      Culture - Blood (03.05.19 @ 21:12)    Specimen Source: .Blood None    Culture Results:   No growth to date.        RADIOLOGY:  < from: Xray Foot AP + Lateral + Oblique, Left (03.05.19 @ 15:14) >  There is no evidence of acute fracture or dislocation. Diffuse   osteopenia. Scattered degenerative changes. Mild soft tissue swelling of   the foot, without any radiopaque foreign body.    Impression: No acute fracture or dislocation.      < end of copied text >    < from: VA Duplex Lower Ext Vein Scan, Bilat (03.05.19 @ 15:56) >  Impression:    No evidence of deep venous thrombosis or superficial thrombophlebitis in   the bilateral lower extremities.    < end of copied text >    < from: VA Duplex Lower Extrem Arterial, Bilat (03.05.19 @ 15:57) >  Impression:    Bilateral peripheral arterial occlusive disease. Moderate to high-grade   stenosis in the left superficial femoral artery.    < end of copied text >      < from: Transthoracic Echocardiogram (03.08.19 @ 08:47) >  Summary:   1. LV Ejection Fraction by Zavala's Method with a biplane EF of 58 %.   2. Normal left ventricular size and wall thicknesses, with normal   systolic function.   3. Spectral Doppler shows impaired relaxation pattern of left   ventricular myocardial filling (Grade I diastolic dysfunction).   4. Moderate mitral annular calcification.   5. Structurally normal mitral valve, with normal leaflet excursion.   6. Mild tricuspid regurgitation.   7. 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 MildAS by   doppler.   8. Pulmonic valve regurgitation.    < end of copied text >      PHYSICAL EXAM:    GENERAL: NAD, well-developed, AAOx3  HEENT:  Atraumatic, Normocephalic. EOMI, PERRLA no JVD  PULMONARY: Clear to auscultation bilaterally; No wheeze  CARDIOVASCULAR: Regular rate and rhythm; + holosystolic murmur  GASTROINTESTINAL: Soft, Nontender, Nondistended; Bowel sounds present  MUSCULOSKELETAL:  legs wrapped in b/l ace bandages from ankle to thigh. Knees and upper thighs are non-edematous, non erythematous + tenderness to palpation on left side under knee.  Based on previous pictures:  has large left medial ankle extending to posterior heel. base is clean granulating tissue, rolled borders without erythema, no pus.   No edema or erythema above ace-bandages. 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 Chance (as per son, gets Doxil every four weeks, last treatment was 2/21)   -  Arterial duplex positive for moderate to severe stenosis in left superficial femoral artery, no DVT  - Vascular planning for Angiogram on Tuesday (3/12)   -ID following: no antibiotics needed at this time  - Dressing changes as per podiatry- cw with Santyl  - Pain control with fentanyl patch (home med) and tylenol PRN    #HTN  cw losartan   (takes valsartan at home)    #Dementia/ sundowning  - cw home meds- olanzapine     #Diet: DASH diet  #DVT ppx: heparin subq  # GI ppx: not indicated  #Dispo: from home, can likely go back home when medically stable  Pt/Rehab ordered on 3/7, not participating in PT on 3/8 due to pain in feet  Keeping patient in house at least until Angiogram on tuesday given she has difficulty walking and her  will have difficulty taking care of her and helping her ambulate considering he has some disabilities himself.   Son Berry, preferred contact. phone number: [phone]. Agreeable with plan    Attending Attestation  Pt has been seen and examined. case and plan discussed at rounds and at bedside and agree with above documentation as corrected.   Pt is awaiting Angiogram for SFA Stenosis scheduled for tomorrow. Pls check CXR, EKG this afternoon. Keep NPO past MN and Type and Screen.  So far pt medically optimal for scheduled Angiogram    Dispo: OR Tuesday am. Family on board

## 2019-03-11 NOTE — PRE-ANESTHESIA EVALUATION ADULT - NSRADCARDRESULTSFT_GEN_ALL_CORE
1. LV Ejection Fraction by Zavala's Method with a biplane EF of 58 %.   2. Normal left ventricular size and wall thicknesses, with normal   systolic function.   3. Spectral Doppler shows impaired relaxation pattern of left   ventricular myocardial filling (Grade I diastolic dysfunction).   4. Moderate mitral annular calcification.   5. Structurally normal mitral valve, with normal leaflet excursion.   6. Mild tricuspid regurgitation.   7. 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 Mild AS by   doppler.   8. Pulmonic valve regurgitation.

## 2019-03-12 ENCOUNTER — APPOINTMENT (OUTPATIENT)
Dept: VASCULAR SURGERY | Facility: HOSPITAL | Age: 84
End: 2019-03-12
Payer: MEDICARE

## 2019-03-12 LAB
ANION GAP SERPL CALC-SCNC: 13 MMOL/L — SIGNIFICANT CHANGE UP (ref 7–14)
APTT BLD: 35.6 SEC — SIGNIFICANT CHANGE UP (ref 27–39.2)
BLD GP AB SCN SERPL QL: SIGNIFICANT CHANGE UP
BUN SERPL-MCNC: 18 MG/DL — SIGNIFICANT CHANGE UP (ref 10–20)
CALCIUM SERPL-MCNC: 9.1 MG/DL — SIGNIFICANT CHANGE UP (ref 8.5–10.1)
CHLORIDE SERPL-SCNC: 102 MMOL/L — SIGNIFICANT CHANGE UP (ref 98–110)
CO2 SERPL-SCNC: 25 MMOL/L — SIGNIFICANT CHANGE UP (ref 17–32)
CREAT SERPL-MCNC: 0.7 MG/DL — SIGNIFICANT CHANGE UP (ref 0.7–1.5)
GLUCOSE SERPL-MCNC: 96 MG/DL — SIGNIFICANT CHANGE UP (ref 70–99)
HCT VFR BLD CALC: 38.8 % — SIGNIFICANT CHANGE UP (ref 37–47)
HGB BLD-MCNC: 12.3 G/DL — SIGNIFICANT CHANGE UP (ref 12–16)
INR BLD: 1.02 RATIO — SIGNIFICANT CHANGE UP (ref 0.65–1.3)
MAGNESIUM SERPL-MCNC: 2.3 MG/DL — SIGNIFICANT CHANGE UP (ref 1.8–2.4)
MCHC RBC-ENTMCNC: 29.9 PG — SIGNIFICANT CHANGE UP (ref 27–31)
MCHC RBC-ENTMCNC: 31.7 G/DL — LOW (ref 32–37)
MCV RBC AUTO: 94.4 FL — SIGNIFICANT CHANGE UP (ref 81–99)
NRBC # BLD: 0 /100 WBCS — SIGNIFICANT CHANGE UP (ref 0–0)
PLATELET # BLD AUTO: 304 K/UL — SIGNIFICANT CHANGE UP (ref 130–400)
POTASSIUM SERPL-MCNC: 4.5 MMOL/L — SIGNIFICANT CHANGE UP (ref 3.5–5)
POTASSIUM SERPL-SCNC: 4.5 MMOL/L — SIGNIFICANT CHANGE UP (ref 3.5–5)
PROTHROM AB SERPL-ACNC: 11.7 SEC — SIGNIFICANT CHANGE UP (ref 9.95–12.87)
RBC # BLD: 4.11 M/UL — LOW (ref 4.2–5.4)
RBC # FLD: 14.4 % — SIGNIFICANT CHANGE UP (ref 11.5–14.5)
SODIUM SERPL-SCNC: 140 MMOL/L — SIGNIFICANT CHANGE UP (ref 135–146)
TYPE + AB SCN PNL BLD: SIGNIFICANT CHANGE UP
WBC # BLD: 3.17 K/UL — LOW (ref 4.8–10.8)
WBC # FLD AUTO: 3.17 K/UL — LOW (ref 4.8–10.8)

## 2019-03-12 PROCEDURE — 75710 ARTERY X-RAYS ARM/LEG: CPT | Mod: 26,59

## 2019-03-12 PROCEDURE — 37224: CPT | Mod: LT

## 2019-03-12 PROCEDURE — 76937 US GUIDE VASCULAR ACCESS: CPT | Mod: 26

## 2019-03-12 PROCEDURE — 36247 INS CATH ABD/L-EXT ART 3RD: CPT | Mod: 59,LT

## 2019-03-12 RX ORDER — SIMVASTATIN 20 MG/1
20 TABLET, FILM COATED ORAL AT BEDTIME
Qty: 0 | Refills: 0 | Status: DISCONTINUED | OUTPATIENT
Start: 2019-03-12 | End: 2019-03-14

## 2019-03-12 RX ORDER — SENNA PLUS 8.6 MG/1
2 TABLET ORAL AT BEDTIME
Qty: 0 | Refills: 0 | Status: DISCONTINUED | OUTPATIENT
Start: 2019-03-12 | End: 2019-03-14

## 2019-03-12 RX ORDER — GABAPENTIN 400 MG/1
300 CAPSULE ORAL AT BEDTIME
Qty: 0 | Refills: 0 | Status: DISCONTINUED | OUTPATIENT
Start: 2019-03-12 | End: 2019-03-14

## 2019-03-12 RX ORDER — FENTANYL CITRATE 50 UG/ML
1 INJECTION INTRAVENOUS
Qty: 0 | Refills: 0 | Status: DISCONTINUED | OUTPATIENT
Start: 2019-03-12 | End: 2019-03-14

## 2019-03-12 RX ORDER — HYDROMORPHONE HYDROCHLORIDE 2 MG/ML
0.5 INJECTION INTRAMUSCULAR; INTRAVENOUS; SUBCUTANEOUS
Qty: 0 | Refills: 0 | Status: DISCONTINUED | OUTPATIENT
Start: 2019-03-12 | End: 2019-03-13

## 2019-03-12 RX ORDER — ACETAMINOPHEN 500 MG
650 TABLET ORAL EVERY 8 HOURS
Qty: 0 | Refills: 0 | Status: DISCONTINUED | OUTPATIENT
Start: 2019-03-12 | End: 2019-03-14

## 2019-03-12 RX ORDER — LOSARTAN POTASSIUM 100 MG/1
25 TABLET, FILM COATED ORAL DAILY
Qty: 0 | Refills: 0 | Status: DISCONTINUED | OUTPATIENT
Start: 2019-03-12 | End: 2019-03-14

## 2019-03-12 RX ORDER — ASPIRIN/CALCIUM CARB/MAGNESIUM 324 MG
325 TABLET ORAL ONCE
Qty: 0 | Refills: 0 | Status: COMPLETED | OUTPATIENT
Start: 2019-03-12 | End: 2019-03-12

## 2019-03-12 RX ORDER — CHLORHEXIDINE GLUCONATE 213 G/1000ML
1 SOLUTION TOPICAL
Qty: 0 | Refills: 0 | Status: DISCONTINUED | OUTPATIENT
Start: 2019-03-12 | End: 2019-03-14

## 2019-03-12 RX ORDER — HEPARIN SODIUM 5000 [USP'U]/ML
5000 INJECTION INTRAVENOUS; SUBCUTANEOUS EVERY 8 HOURS
Qty: 0 | Refills: 0 | Status: DISCONTINUED | OUTPATIENT
Start: 2019-03-12 | End: 2019-03-14

## 2019-03-12 RX ORDER — SODIUM CHLORIDE 9 MG/ML
1000 INJECTION, SOLUTION INTRAVENOUS
Qty: 0 | Refills: 0 | Status: DISCONTINUED | OUTPATIENT
Start: 2019-03-12 | End: 2019-03-13

## 2019-03-12 RX ORDER — ASPIRIN/CALCIUM CARB/MAGNESIUM 324 MG
81 TABLET ORAL DAILY
Qty: 0 | Refills: 0 | Status: DISCONTINUED | OUTPATIENT
Start: 2019-03-12 | End: 2019-03-14

## 2019-03-12 RX ORDER — COLLAGENASE CLOSTRIDIUM HIST. 250 UNIT/G
1 OINTMENT (GRAM) TOPICAL
Qty: 0 | Refills: 0 | Status: DISCONTINUED | OUTPATIENT
Start: 2019-03-12 | End: 2019-03-14

## 2019-03-12 RX ORDER — OLANZAPINE 15 MG/1
2.5 TABLET, FILM COATED ORAL
Qty: 0 | Refills: 0 | Status: DISCONTINUED | OUTPATIENT
Start: 2019-03-12 | End: 2019-03-14

## 2019-03-12 RX ORDER — DOCUSATE SODIUM 100 MG
100 CAPSULE ORAL THREE TIMES A DAY
Qty: 0 | Refills: 0 | Status: DISCONTINUED | OUTPATIENT
Start: 2019-03-12 | End: 2019-03-14

## 2019-03-12 RX ADMIN — OLANZAPINE 2.5 MILLIGRAM(S): 15 TABLET, FILM COATED ORAL at 20:23

## 2019-03-12 RX ADMIN — SODIUM CHLORIDE 50 MILLILITER(S): 9 INJECTION, SOLUTION INTRAVENOUS at 18:09

## 2019-03-12 RX ADMIN — HEPARIN SODIUM 5000 UNIT(S): 5000 INJECTION INTRAVENOUS; SUBCUTANEOUS at 06:15

## 2019-03-12 RX ADMIN — LOSARTAN POTASSIUM 25 MILLIGRAM(S): 100 TABLET, FILM COATED ORAL at 21:26

## 2019-03-12 RX ADMIN — HEPARIN SODIUM 5000 UNIT(S): 5000 INJECTION INTRAVENOUS; SUBCUTANEOUS at 13:12

## 2019-03-12 RX ADMIN — Medication 325 MILLIGRAM(S): at 19:45

## 2019-03-12 RX ADMIN — Medication 100 MILLIGRAM(S): at 21:25

## 2019-03-12 RX ADMIN — Medication 1 APPLICATION(S): at 06:14

## 2019-03-12 RX ADMIN — Medication 100 MILLIGRAM(S): at 06:15

## 2019-03-12 RX ADMIN — FENTANYL CITRATE 1 PATCH: 50 INJECTION INTRAVENOUS at 21:28

## 2019-03-12 RX ADMIN — OLANZAPINE 2.5 MILLIGRAM(S): 15 TABLET, FILM COATED ORAL at 06:15

## 2019-03-12 RX ADMIN — CHLORHEXIDINE GLUCONATE 1 APPLICATION(S): 213 SOLUTION TOPICAL at 06:13

## 2019-03-12 RX ADMIN — SENNA PLUS 2 TABLET(S): 8.6 TABLET ORAL at 21:25

## 2019-03-12 RX ADMIN — HEPARIN SODIUM 5000 UNIT(S): 5000 INJECTION INTRAVENOUS; SUBCUTANEOUS at 21:26

## 2019-03-12 RX ADMIN — LOSARTAN POTASSIUM 25 MILLIGRAM(S): 100 TABLET, FILM COATED ORAL at 06:15

## 2019-03-12 RX ADMIN — SIMVASTATIN 20 MILLIGRAM(S): 20 TABLET, FILM COATED ORAL at 21:27

## 2019-03-12 RX ADMIN — Medication 81 MILLIGRAM(S): at 21:26

## 2019-03-12 RX ADMIN — GABAPENTIN 300 MILLIGRAM(S): 400 CAPSULE ORAL at 21:25

## 2019-03-12 RX ADMIN — FENTANYL CITRATE 1 PATCH: 50 INJECTION INTRAVENOUS at 22:12

## 2019-03-12 NOTE — PROGRESS NOTE ADULT - REASON FOR ADMISSION
Leg pain / ulcer
RLE chronic venus ulcer
chronic LLE ulcer
chronic venous stasis ulcer
chronic venous stasis ulcer
leg pain
RLE chronic venus ulcer

## 2019-03-12 NOTE — PROGRESS NOTE ADULT - ASSESSMENT
KALI ANDERS 85y Female  MRN#: [id number]     SUBJECTIVE  Patient is a 85y old Female who presents with a chief complaint of Currently admitted to medicine with the primary diagnosis of left lower extremity swelling     Today is hospital day 7d, no acute events overnight. comfortable this morning. Plan for angiogram today     OBJECTIVE  Home Medications:  fentaNYL 12 mcg/hr transdermal film, extended release: 1 patch transdermal every 72 hours (08 Mar 2019 13:38)  valsartan 40 mg oral tablet: 40 milligram(s) orally once a day (05 Mar 2019 17:50)    MEDICATIONS:  STANDING MEDICATIONS  chlorhexidine 4% Liquid 1 Application(s) Topical <User Schedule>  collagenase Ointment 1 Application(s) Topical two times a day  docusate sodium 100 milliGRAM(s) Oral three times a day  fentaNYL   Patch  12 MICROgram(s)/Hr 1 Patch Transdermal every 72 hours  gabapentin 300 milliGRAM(s) Oral at bedtime  heparin  Injectable 5000 Unit(s) SubCutaneous every 8 hours  losartan 25 milliGRAM(s) Oral daily  OLANZapine 2.5 milliGRAM(s) Oral two times a day  senna 2 Tablet(s) Oral at bedtime    PRN MEDICATIONS  acetaminophen   Tablet .. 650 milliGRAM(s) Oral every 8 hours PRN      VITAL SIGNS: Last 24 Hours  T(C): 35.6 (12 Mar 2019 05:35), Max: 36.2 (11 Mar 2019 21:35)  T(F): 96 (12 Mar 2019 05:35), Max: 97.1 (11 Mar 2019 21:35)  HR: 62 (12 Mar 2019 05:35) (62 - 85)  BP: 175/79 (12 Mar 2019 05:35) (120/58 - 175/79)  BP(mean): --  RR: 18 (12 Mar 2019 05:35) (18 - 18)  SpO2: 98% (12 Mar 2019 08:12) (98% - 98%)    LABS:                        12.3   3.17  )-----------( 304      ( 12 Mar 2019 06:10 )             38.8     03-12    140  |  102  |  18  ----------------------------<  96  4.5   |  25  |  0.7    Ca    9.1      12 Mar 2019 06:10  Mg     2.3     03-12        PT/INR - ( 12 Mar 2019 06:10 )   PT: 11.70 sec;   INR: 1.02 ratio         PTT - ( 12 Mar 2019 06:10 )  PTT:35.6 sec        RADIOLOGY:  < from: Xray Foot AP + Lateral + Oblique, Left (03.05.19 @ 15:14) >  There is no evidence of acute fracture or dislocation. Diffuse   osteopenia. Scattered degenerative changes. Mild soft tissue swelling of   the foot, without any radiopaque foreign body.    Impression: No acute fracture or dislocation.      < end of copied text >    < from: VA Duplex Lower Ext Vein Scan, Bilat (03.05.19 @ 15:56) >  Impression:    No evidence of deep venous thrombosis or superficial thrombophlebitis in   the bilateral lower extremities.    < end of copied text >    < from: VA Duplex Lower Extrem Arterial, Bilat (03.05.19 @ 15:57) >  Impression:    Bilateral peripheral arterial occlusive disease. Moderate to high-grade   stenosis in the left superficial femoral artery.    < end of copied text >      < from: Transthoracic Echocardiogram (03.08.19 @ 08:47) >  Summary:   1. LV Ejection Fraction by Zavala's Method with a biplane EF of 58 %.   2. Normal left ventricular size and wall thicknesses, with normal   systolic function.   3. Spectral Doppler shows impaired relaxation pattern of left   ventricular myocardial filling (Grade I diastolic dysfunction).   4. Moderate mitral annular calcification.   5. Structurally normal mitral valve, with normal leaflet excursion.   6. Mild tricuspid regurgitation.   7. 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 MildAS by   doppler.   8. Pulmonic valve regurgitation.    < end of copied text >      PHYSICAL EXAM:    GENERAL: NAD, well-developed, AAOx3  HEENT:  Atraumatic, Normocephalic. EOMI, PERRLA no JVD  PULMONARY: Clear to auscultation bilaterally; No wheeze  CARDIOVASCULAR: Regular rate and rhythm; + holosystolic murmur  GASTROINTESTINAL: Soft, Nontender, Nondistended; Bowel sounds present  MUSCULOSKELETAL:  legs wrapped in b/l ace bandages from ankle to thigh. Knees and upper thighs are non-edematous, non erythematous + tenderness to palpation on left side under knee.  Based on previous pictures:  has large left medial ankle extending to posterior heel. base is clean granulating tissue, rolled borders without erythema, no pus.   No edema or erythema above ace-bandages. 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 Chance (as per son, gets Doxil every four weeks, last treatment was 2/21)   -  Arterial duplex positive for moderate to severe stenosis in left superficial femoral artery, no DVT  - Vascular planning for Angiogram today   -ID following: no antibiotics needed at this time  - Dressing changes as per podiatry- cw with Santyl  - Pain control with fentanyl patch (home med) and tylenol PRN    #HTN  cw losartan   (takes valsartan at home)    #Dementia/ sundowning  - cw home meds- olanzapine     #Diet: DASH diet  #DVT ppx: heparin subq  # GI ppx: not indicated  #Dispo: from home, still acute, SNF vs home after angiogram depending on results,   Pt/Rehab ordered on 3/7, not participating in PT on 3/8 due to pain in feet  Son Berry, preferred contact. phone number: [phone]. Agreeable with plan KALI ANDERS 85y Female  MRN#: [id number]     SUBJECTIVE  Patient is a 85y old Female who presents with a chief complaint of Currently admitted to medicine with the primary diagnosis of left lower extremity swelling     Today is hospital day 7d, no acute events overnight. comfortable this morning. Plan for angiogram today     OBJECTIVE  Home Medications:  fentaNYL 12 mcg/hr transdermal film, extended release: 1 patch transdermal every 72 hours (08 Mar 2019 13:38)  valsartan 40 mg oral tablet: 40 milligram(s) orally once a day (05 Mar 2019 17:50)    MEDICATIONS:  STANDING MEDICATIONS  chlorhexidine 4% Liquid 1 Application(s) Topical <User Schedule>  collagenase Ointment 1 Application(s) Topical two times a day  docusate sodium 100 milliGRAM(s) Oral three times a day  fentaNYL   Patch  12 MICROgram(s)/Hr 1 Patch Transdermal every 72 hours  gabapentin 300 milliGRAM(s) Oral at bedtime  heparin  Injectable 5000 Unit(s) SubCutaneous every 8 hours  losartan 25 milliGRAM(s) Oral daily  OLANZapine 2.5 milliGRAM(s) Oral two times a day  senna 2 Tablet(s) Oral at bedtime    PRN MEDICATIONS  acetaminophen   Tablet .. 650 milliGRAM(s) Oral every 8 hours PRN      VITAL SIGNS: Last 24 Hours  T(C): 35.6 (12 Mar 2019 05:35), Max: 36.2 (11 Mar 2019 21:35)  T(F): 96 (12 Mar 2019 05:35), Max: 97.1 (11 Mar 2019 21:35)  HR: 62 (12 Mar 2019 05:35) (62 - 85)  BP: 175/79 (12 Mar 2019 05:35) (120/58 - 175/79)  RR: 18 (12 Mar 2019 05:35) (18 - 18)  SpO2: 98% (12 Mar 2019 08:12) (98% - 98%)    LABS:                        12.3   3.17  )-----------( 304      ( 12 Mar 2019 06:10 )             38.8     03-12    140  |  102  |  18  ----------------------------<  96  4.5   |  25  |  0.7    Ca    9.1      12 Mar 2019 06:10  Mg     2.3     03-12    PT/INR - ( 12 Mar 2019 06:10 )   PT: 11.70 sec;   INR: 1.02 ratio       PTT - ( 12 Mar 2019 06:10 )  PTT:35.6 sec    RADIOLOGY:  < from: Xray Foot AP + Lateral + Oblique, Left (03.05.19 @ 15:14) >  There is no evidence of acute fracture or dislocation. Diffuse   osteopenia. Scattered degenerative changes. Mild soft tissue swelling of   the foot, without any radiopaque foreign body.    Impression: No acute fracture or dislocation.      < end of copied text >    < from: VA Duplex Lower Ext Vein Scan, Bilat (03.05.19 @ 15:56) >  Impression:    No evidence of deep venous thrombosis or superficial thrombophlebitis in   the bilateral lower extremities.    < end of copied text >    < from: VA Duplex Lower Extrem Arterial, Bilat (03.05.19 @ 15:57) >  Impression:    Bilateral peripheral arterial occlusive disease. Moderate to high-grade   stenosis in the left superficial femoral artery.    < end of copied text >      < from: Transthoracic Echocardiogram (03.08.19 @ 08:47) >  Summary:   1. LV Ejection Fraction by Zavala's Method with a biplane EF of 58 %.   2. Normal left ventricular size and wall thicknesses, with normal   systolic function.   3. Spectral Doppler shows impaired relaxation pattern of left   ventricular myocardial filling (Grade I diastolic dysfunction).   4. Moderate mitral annular calcification.   5. Structurally normal mitral valve, with normal leaflet excursion.   6. Mild tricuspid regurgitation.   7. 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 MildAS by   doppler.   8. Pulmonic valve regurgitation.    < end of copied text >      PHYSICAL EXAM:    GENERAL: NAD, well-developed, AAOx3  HEENT:  Atraumatic, Normocephalic. EOMI, PERRLA no JVD  PULMONARY: Clear to auscultation bilaterally; No wheeze  CARDIOVASCULAR: Regular rate and rhythm; + holosystolic murmur  GASTROINTESTINAL: Soft, Nontender, Nondistended; Bowel sounds present  MUSCULOSKELETAL:  legs wrapped in b/l ace bandages from ankle to thigh. Knees and upper thighs are non-edematous, non erythematous + tenderness to palpation on left side under knee.  Based on previous pictures:  has large left medial ankle extending to posterior heel. base is clean granulating tissue, rolled borders without erythema, no pus.   No edema or erythema above ace-bandages. 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 Chance (as per son, gets Doxil every four weeks, last treatment was 2/21)   -  Arterial duplex positive for moderate to severe stenosis in left superficial femoral artery, no DVT  - Vascular planning for Angiogram today   -ID following: no antibiotics needed at this time  - Dressing changes as per podiatry- cw with Santyl  - Pain control with fentanyl patch (home med) and tylenol PRN    #HTN  cw losartan   (takes valsartan at home)    #Dementia/ sundowning  - cw home meds- olanzapine     #Diet: DASH diet  #DVT ppx: heparin subq  # GI ppx: not indicated  #Dispo: from home, still acute, SNF vs home after angiogram depending on results,   Pt/Rehab ordered on 3/7, not participating in PT on 3/8 due to pain in feet  Son Berry, preferred contact. phone number: [phone]. Agreeable with plan    Attending Attestation  Pt has been seen and examined. Case and Plan discussed and pt is ready for Angiogram procedure scheduled today.  CXR reviewed and EKG reviewed, Labs reviewed, TTE Reviewed. Vitals Stable. PT is NPO.   Medically optimal for scheduled procedure. METS 2 due to leg pain. Limited mobility.   Will follow post op    Agree with above Resident note as corrected.    Dispo: OR today. Will follow. Case d/w family and on board.

## 2019-03-12 NOTE — DIETITIAN INITIAL EVALUATION ADULT. - ENERGY NEEDS
Energy: 8727-0463 kcal/day (MSJx1.1-1.2 AF)    Protein: 53-63 g/day (1-1.2 g/kg IBW)    Fluids: 1 mL/kcal or per LIP

## 2019-03-12 NOTE — BRIEF OPERATIVE NOTE - NSICDXBRIEFPROCEDURE_GEN_ALL_CORE_FT
PROCEDURES:  Angiogram selective 12-Mar-2019 18:07:30 Left leg angiogram; SFA angioplasty Tierney Dove

## 2019-03-12 NOTE — DIETITIAN INITIAL EVALUATION ADULT. - PHYSICAL APPEARANCE
BMI: 30.3. Alert, somnolent. Last BM reported 3/8. No chewing/swallowing difficulty reported. Skin: L LE venous ulcer.

## 2019-03-12 NOTE — DIETITIAN INITIAL EVALUATION ADULT. - SOURCE
family/significant other/Fair appetite & po intake PTP. Regular diet. NKFA. No supplements. No known history of unintentional wt loss.

## 2019-03-12 NOTE — PRE-ANESTHESIA EVALUATION ADULT - NSANTHOSAYNRD_GEN_A_CORE
No. STARR screening performed.  STOP BANG Legend: 0-2 = LOW Risk; 3-4 = INTERMEDIATE Risk; 5-8 = HIGH Risk
No. STARR screening performed.  STOP BANG Legend: 0-2 = LOW Risk; 3-4 = INTERMEDIATE Risk; 5-8 = HIGH Risk

## 2019-03-12 NOTE — DIETITIAN INITIAL EVALUATION ADULT. - OTHER INFO
Reason for RD assessment: LOS assessment. Pertinent Medical Information: Left Lower Extremity Venous Stasis Ulcer. Plan for angiogram today. Patient has history of kaposi sarcoma getting chemotherapy treatments. Dementia/sundowning noted.

## 2019-03-12 NOTE — DIETITIAN INITIAL EVALUATION ADULT. - DIET TYPE
consistent carbohydrate (evening snack)/DASH/TLC (sodium and cholesterol restricted diet)/Fair appetite reported; consuming % of meals at this time.

## 2019-03-12 NOTE — PROGRESS NOTE ADULT - SUBJECTIVE AND OBJECTIVE BOX
Procedure: Status post s/p lle angio sfa angioplasty with three vessel tibial runoff  Post Operative day #1    Patient resting comfortably no complaints    CELLULITIS  ^SENT IN BY DR HOLT/FOOT PAIN  Handoff  MEWS Score  Skin ulcer of left ankle, limited to breakdown of skin  Leg swelling  HTN (hypertension)  Peripheral arterial disease  Peripheral arterial disease  Skin ulcer of left ankle, limited to breakdown of skin  Cellulitis  Suspected deep vein thrombosis (DVT)  Angiogram selective  SENT IN BY DR HOLT/FOOT PAIN  90+    vancomycin (Flushing)    acetaminophen   Tablet .. 650 milliGRAM(s) Oral every 8 hours PRN  aspirin enteric coated 81 milliGRAM(s) Oral daily  chlorhexidine 4% Liquid 1 Application(s) Topical <User Schedule>  collagenase Ointment 1 Application(s) Topical two times a day  docusate sodium 100 milliGRAM(s) Oral three times a day  fentaNYL   Patch  12 MICROgram(s)/Hr 1 Patch Transdermal every 72 hours  gabapentin 300 milliGRAM(s) Oral at bedtime  heparin  Injectable 5000 Unit(s) SubCutaneous every 8 hours  HYDROmorphone  Injectable 0.5 milliGRAM(s) IV Push every 10 minutes PRN  lactated ringers. 1000 milliLiter(s) IV Continuous <Continuous>  losartan 25 milliGRAM(s) Oral daily  OLANZapine 2.5 milliGRAM(s) Oral two times a day  senna 2 Tablet(s) Oral at bedtime  simvastatin 20 milliGRAM(s) Oral at bedtime          T(C): 37 (19 @ 20:00), Max: 37.2 (19 @ 13:45)  HR: 67 (19 @ 21:48) (56 - 69)  BP: 162/77 (19 @ 21:48) (122/68 - 175/79)  RR: 18 (19 @ 21:48) (14 - 18)  SpO2: 98% (19 @ 21:48) (98% - 100%)    19 @ 07:01  -  19 @ 23:56  --------------------------------------------------------  IN: 100 mL / OUT: 0 mL / NET: 100 mL        General:Alert and oriented times 3, not in acute distress   Heart: Regular rate and rhythm, no rubs , murmurs or gallops  Lungs: Clear to auscultation bilaterally, no wheezes, rales, rhonci appreciated  Abdomen: Soft , positive bowel sounds, no tenderness, no distention, no peritoneal signs   Exremites: right groin incision clean dry and intact,  warm extremities,  good color, no swelling, motor and sensation , pulses                12.3   3.17  )-----------( 304      (  @ 06:10 )             38.8                12.0   3.38  )-----------( 279      (  @ 07:08 )             37.9                    140   |  102   |  18                 Ca: 9.1    BMP:   ----------------------------< 96     M.3   (19 @ 06:10)             4.5    |  25    | 0.7                Ph: x        LFT:     TPro: 6.0 / Alb: 3.6 / TBili: 0.4 / DBili: x / AST: 13 / ALT: 7 / AlkPhos: 98   (19 @ 07:24)          PT/INR - ( 12 Mar 2019 06:10 )   PT: 11.70 sec;   INR: 1.02 ratio         PTT - ( 12 Mar 2019 06:10 )  PTT:35.6 sec                               Plan and assessment  Pt. 85yFemale status post lle angiogram    -Pain management  - Diet  -incentive spirometry  -gi dvt prophylaxis  -Vascular to follow call as needed     JULIA Wetzel   19 @ 23:56  # [id number]/ [id number]

## 2019-03-13 LAB
ANION GAP SERPL CALC-SCNC: 10 MMOL/L — SIGNIFICANT CHANGE UP (ref 7–14)
BUN SERPL-MCNC: 21 MG/DL — HIGH (ref 10–20)
CALCIUM SERPL-MCNC: 8.8 MG/DL — SIGNIFICANT CHANGE UP (ref 8.5–10.1)
CHLORIDE SERPL-SCNC: 102 MMOL/L — SIGNIFICANT CHANGE UP (ref 98–110)
CO2 SERPL-SCNC: 26 MMOL/L — SIGNIFICANT CHANGE UP (ref 17–32)
CREAT SERPL-MCNC: 0.7 MG/DL — SIGNIFICANT CHANGE UP (ref 0.7–1.5)
GLUCOSE SERPL-MCNC: 108 MG/DL — HIGH (ref 70–99)
HCT VFR BLD CALC: 34.7 % — LOW (ref 37–47)
HGB BLD-MCNC: 11 G/DL — LOW (ref 12–16)
MAGNESIUM SERPL-MCNC: 2.3 MG/DL — SIGNIFICANT CHANGE UP (ref 1.8–2.4)
MCHC RBC-ENTMCNC: 29.9 PG — SIGNIFICANT CHANGE UP (ref 27–31)
MCHC RBC-ENTMCNC: 31.7 G/DL — LOW (ref 32–37)
MCV RBC AUTO: 94.3 FL — SIGNIFICANT CHANGE UP (ref 81–99)
NRBC # BLD: 0 /100 WBCS — SIGNIFICANT CHANGE UP (ref 0–0)
PLATELET # BLD AUTO: 324 K/UL — SIGNIFICANT CHANGE UP (ref 130–400)
POTASSIUM SERPL-MCNC: 4.6 MMOL/L — SIGNIFICANT CHANGE UP (ref 3.5–5)
POTASSIUM SERPL-SCNC: 4.6 MMOL/L — SIGNIFICANT CHANGE UP (ref 3.5–5)
RBC # BLD: 3.68 M/UL — LOW (ref 4.2–5.4)
RBC # FLD: 14.7 % — HIGH (ref 11.5–14.5)
SODIUM SERPL-SCNC: 138 MMOL/L — SIGNIFICANT CHANGE UP (ref 135–146)
WBC # BLD: 4.63 K/UL — LOW (ref 4.8–10.8)
WBC # FLD AUTO: 4.63 K/UL — LOW (ref 4.8–10.8)

## 2019-03-13 RX ADMIN — GABAPENTIN 300 MILLIGRAM(S): 400 CAPSULE ORAL at 21:44

## 2019-03-13 RX ADMIN — Medication 1 APPLICATION(S): at 05:34

## 2019-03-13 RX ADMIN — OLANZAPINE 2.5 MILLIGRAM(S): 15 TABLET, FILM COATED ORAL at 05:35

## 2019-03-13 RX ADMIN — Medication 1 APPLICATION(S): at 18:49

## 2019-03-13 RX ADMIN — HEPARIN SODIUM 5000 UNIT(S): 5000 INJECTION INTRAVENOUS; SUBCUTANEOUS at 13:35

## 2019-03-13 RX ADMIN — Medication 100 MILLIGRAM(S): at 05:34

## 2019-03-13 RX ADMIN — OLANZAPINE 2.5 MILLIGRAM(S): 15 TABLET, FILM COATED ORAL at 18:49

## 2019-03-13 RX ADMIN — Medication 100 MILLIGRAM(S): at 13:35

## 2019-03-13 RX ADMIN — SIMVASTATIN 20 MILLIGRAM(S): 20 TABLET, FILM COATED ORAL at 21:44

## 2019-03-13 RX ADMIN — LOSARTAN POTASSIUM 25 MILLIGRAM(S): 100 TABLET, FILM COATED ORAL at 05:35

## 2019-03-13 RX ADMIN — FENTANYL CITRATE 1 PATCH: 50 INJECTION INTRAVENOUS at 18:52

## 2019-03-13 RX ADMIN — Medication 81 MILLIGRAM(S): at 12:13

## 2019-03-13 RX ADMIN — FENTANYL CITRATE 1 PATCH: 50 INJECTION INTRAVENOUS at 06:20

## 2019-03-13 RX ADMIN — Medication 100 MILLIGRAM(S): at 21:44

## 2019-03-13 RX ADMIN — HEPARIN SODIUM 5000 UNIT(S): 5000 INJECTION INTRAVENOUS; SUBCUTANEOUS at 21:44

## 2019-03-13 RX ADMIN — SENNA PLUS 2 TABLET(S): 8.6 TABLET ORAL at 21:44

## 2019-03-13 RX ADMIN — HEPARIN SODIUM 5000 UNIT(S): 5000 INJECTION INTRAVENOUS; SUBCUTANEOUS at 05:33

## 2019-03-13 RX ADMIN — CHLORHEXIDINE GLUCONATE 1 APPLICATION(S): 213 SOLUTION TOPICAL at 05:34

## 2019-03-13 NOTE — PROGRESS NOTE ADULT - ASSESSMENT
KALI ANDERS 85y Female  MRN#: [id number]       SUBJECTIVE  Patient is a 85y old Female who presents with a chief complaint of Currently admitted to medicine with the primary diagnosis of left lower extremity swelling    Today is hospital day 8d, s/p  LE angiogram yesteday. Pain well controlled.       OBJECTIVE  Home Medications:  fentaNYL 12 mcg/hr transdermal film, extended release: 1 patch transdermal every 72 hours (08 Mar 2019 13:38)  valsartan 40 mg oral tablet: 40 milligram(s) orally once a day (05 Mar 2019 17:50)    MEDICATIONS:  STANDING MEDICATIONS  aspirin enteric coated 81 milliGRAM(s) Oral daily  chlorhexidine 4% Liquid 1 Application(s) Topical <User Schedule>  collagenase Ointment 1 Application(s) Topical two times a day  docusate sodium 100 milliGRAM(s) Oral three times a day  fentaNYL   Patch  12 MICROgram(s)/Hr 1 Patch Transdermal every 72 hours  gabapentin 300 milliGRAM(s) Oral at bedtime  heparin  Injectable 5000 Unit(s) SubCutaneous every 8 hours  losartan 25 milliGRAM(s) Oral daily  OLANZapine 2.5 milliGRAM(s) Oral two times a day  senna 2 Tablet(s) Oral at bedtime  simvastatin 20 milliGRAM(s) Oral at bedtime    PRN MEDICATIONS  acetaminophen   Tablet .. 650 milliGRAM(s) Oral every 8 hours PRN      VITAL SIGNS: Last 24 Hours  T(C): 36.4 (13 Mar 2019 04:38), Max: 37.2 (12 Mar 2019 13:45)  T(F): 97.6 (13 Mar 2019 04:38), Max: 99 (12 Mar 2019 13:45)  HR: 75 (13 Mar 2019 04:38) (56 - 75)  BP: 114/56 (13 Mar 2019 04:38) (114/56 - 168/78)  BP(mean): --  RR: 18 (13 Mar 2019 04:38) (14 - 18)  SpO2: 98% (12 Mar 2019 21:48) (98% - 100%)    LABS:                        11.0   4.63  )-----------( 324      ( 13 Mar 2019 07:06 )             34.7     03-12    140  |  102  |  18  ----------------------------<  96  4.5   |  25  |  0.7    Ca    9.1      12 Mar 2019 06:10  Mg     2.3     03-12        PT/INR - ( 12 Mar 2019 06:10 )   PT: 11.70 sec;   INR: 1.02 ratio         PTT - ( 12 Mar 2019 06:10 )  PTT:35.6 sec              RADIOLOGY:  < from: Xray Foot AP + Lateral + Oblique, Left (03.05.19 @ 15:14) >  There is no evidence of acute fracture or dislocation. Diffuse   osteopenia. Scattered degenerative changes. Mild soft tissue swelling of   the foot, without any radiopaque foreign body.    Impression: No acute fracture or dislocation.      < end of copied text >    < from: VA Duplex Lower Ext Vein Scan, Bilat (03.05.19 @ 15:56) >  Impression:    No evidence of deep venous thrombosis or superficial thrombophlebitis in   the bilateral lower extremities.    < end of copied text >    < from: VA Duplex Lower Extrem Arterial, Bilat (03.05.19 @ 15:57) >  Impression:    Bilateral peripheral arterial occlusive disease. Moderate to high-grade   stenosis in the left superficial femoral artery.    < end of copied text >      < from: Transthoracic Echocardiogram (03.08.19 @ 08:47) >  Summary:   1. LV Ejection Fraction by Zavala's Method with a biplane EF of 58 %.   2. Normal left ventricular size and wall thicknesses, with normal   systolic function.   3. Spectral Doppler shows impaired relaxation pattern of left   ventricular myocardial filling (Grade I diastolic dysfunction).   4. Moderate mitral annular calcification.   5. Structurally normal mitral valve, with normal leaflet excursion.   6. Mild tricuspid regurgitation.   7. 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 MildAS by   doppler.   8. Pulmonic valve regurgitation.    < end of copied text >      PHYSICAL EXAM:    GENERAL: NAD, well-developed, AAOx3  HEENT:  Atraumatic, Normocephalic. EOMI, PERRLA no JVD  PULMONARY: Clear to auscultation bilaterally; No wheeze  CARDIOVASCULAR: Regular rate and rhythm; + holosystolic murmur  GASTROINTESTINAL: Soft, Nontender, Nondistended; Bowel sounds present  MUSCULOSKELETAL:  legs wrapped in b/l ace bandages from ankle to thigh. Knees and upper thighs are non-edematous, non erythematous + tenderness to palpation on left side under knee.  Based on previous pictures:  has large left medial ankle extending to posterior heel. base is clean granulating tissue, rolled borders without erythema, no pus.   No edema or erythema above ace-bandages. 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 Chance (as per son, gets Doxil every four weeks, last treatment was 2/21)   -  Arterial duplex positive for moderate to severe stenosis in left superficial femoral artery, no DVT  - s/p Angiogram 3/12 - sfa angioplasty with three vessel run off   -ID following: no antibiotics needed at this time  - Dressing changes as per podiatry- cw with Santyl  - Pain control with fentanyl patch (home med) and tylenol PRN  - Vascular following     #HTN  cw losartan   (takes valsartan at home)    #Dementia/ sundowning  - cw home meds- olanzapine     #Diet: DASH diet  #DVT ppx: heparin subq  # GI ppx: not indicated  #Dispo: from home, still acute, SNF vs home after angiogram depending on results,   Pt/Rehab ordered on 3/7, not participating in PT on 3/8 due to pain in feet  Son Berry, preferred contact. phone number: [phone]. Agreeable with plan

## 2019-03-13 NOTE — PROGRESS NOTE ADULT - ATTENDING COMMENTS
patient seen and examined , agree with pgy 3 assesment and plan except as indicated above,   GEN Lying in no acute distress  HEENT Pupils equal and reactive to light and accommodationSupple Neck  PULM Clear to auscultation bilaterally  CV s1s2 regular rate and rhythm  GI + bowel sounds nontnender  EXT bilateral lower extremities wrapped  PSYCH awake alert and oriented x 3  INTEG No Lesions  NEURO WU  #. Mild tricuspid regurgitation.  # Pulmonic valve regurgitation.  #CKD 2   Discussed with son plan of care and agreed   Progress Note Handoff    Pending:  pt    Family discussion: discussed with son, , and patient , plan is ambulation and dispo based upon pt recommendations    Disposition: Home

## 2019-03-14 VITALS
SYSTOLIC BLOOD PRESSURE: 112 MMHG | DIASTOLIC BLOOD PRESSURE: 61 MMHG | HEART RATE: 67 BPM | TEMPERATURE: 97 F | RESPIRATION RATE: 18 BRPM

## 2019-03-14 LAB
HCT VFR BLD CALC: 34.3 % — LOW (ref 37–47)
HGB BLD-MCNC: 10.8 G/DL — LOW (ref 12–16)
MCHC RBC-ENTMCNC: 29.8 PG — SIGNIFICANT CHANGE UP (ref 27–31)
MCHC RBC-ENTMCNC: 31.5 G/DL — LOW (ref 32–37)
MCV RBC AUTO: 94.5 FL — SIGNIFICANT CHANGE UP (ref 81–99)
NRBC # BLD: 0 /100 WBCS — SIGNIFICANT CHANGE UP (ref 0–0)
PLATELET # BLD AUTO: 337 K/UL — SIGNIFICANT CHANGE UP (ref 130–400)
RBC # BLD: 3.63 M/UL — LOW (ref 4.2–5.4)
RBC # FLD: 14.6 % — HIGH (ref 11.5–14.5)
WBC # BLD: 3.56 K/UL — LOW (ref 4.8–10.8)
WBC # FLD AUTO: 3.56 K/UL — LOW (ref 4.8–10.8)

## 2019-03-14 RX ORDER — SIMVASTATIN 20 MG/1
1 TABLET, FILM COATED ORAL
Qty: 30 | Refills: 0
Start: 2019-03-14 | End: 2019-04-12

## 2019-03-14 RX ORDER — FENTANYL CITRATE 50 UG/ML
1 INJECTION INTRAVENOUS
Qty: 0 | Refills: 0 | COMMUNITY
Start: 2019-03-14

## 2019-03-14 RX ORDER — GABAPENTIN 400 MG/1
1 CAPSULE ORAL
Qty: 0 | Refills: 0 | DISCHARGE
Start: 2019-03-14

## 2019-03-14 RX ORDER — COLLAGENASE CLOSTRIDIUM HIST. 250 UNIT/G
1 OINTMENT (GRAM) TOPICAL
Qty: 90 | Refills: 0
Start: 2019-03-14 | End: 2019-04-12

## 2019-03-14 RX ORDER — ASPIRIN/CALCIUM CARB/MAGNESIUM 324 MG
1 TABLET ORAL
Qty: 30 | Refills: 0
Start: 2019-03-14 | End: 2019-04-12

## 2019-03-14 RX ORDER — OLANZAPINE 15 MG/1
1 TABLET, FILM COATED ORAL
Qty: 0 | Refills: 0 | DISCHARGE
Start: 2019-03-14

## 2019-03-14 RX ADMIN — Medication 81 MILLIGRAM(S): at 11:15

## 2019-03-14 RX ADMIN — OLANZAPINE 2.5 MILLIGRAM(S): 15 TABLET, FILM COATED ORAL at 06:13

## 2019-03-14 RX ADMIN — HEPARIN SODIUM 5000 UNIT(S): 5000 INJECTION INTRAVENOUS; SUBCUTANEOUS at 06:13

## 2019-03-14 RX ADMIN — Medication 1 APPLICATION(S): at 17:27

## 2019-03-14 RX ADMIN — LOSARTAN POTASSIUM 25 MILLIGRAM(S): 100 TABLET, FILM COATED ORAL at 06:13

## 2019-03-14 RX ADMIN — OLANZAPINE 2.5 MILLIGRAM(S): 15 TABLET, FILM COATED ORAL at 17:27

## 2019-03-14 RX ADMIN — Medication 100 MILLIGRAM(S): at 06:13

## 2019-03-14 RX ADMIN — Medication 1 APPLICATION(S): at 06:14

## 2019-03-14 RX ADMIN — HEPARIN SODIUM 5000 UNIT(S): 5000 INJECTION INTRAVENOUS; SUBCUTANEOUS at 13:07

## 2019-03-14 NOTE — PROGRESS NOTE ADULT - SUBJECTIVE AND OBJECTIVE BOX
KALI ANDERS  85y  [facility]      Patient is a 85y old  Female who presents with a chief complaint of chronic LLE ulcer (12 Mar 2019 10:20)      INTERVAL HPI/OVERNIGHT EVENTS:        REVIEW OF SYSTEMS:  CONSTITUTIONAL: No fever, weight loss, or fatigue  EYES: No eye pain, visual disturbances, or discharge  ENMT:  No difficulty hearing, tinnitus, vertigo; No sinus or throat pain  NECK: No pain or stiffness  BREASTS: No pain, masses, or nipple discharge  RESPIRATORY: No cough, wheezing, chills or hemoptysis; No shortness of breath  CARDIOVASCULAR: No chest pain, palpitations, dizziness, or leg swelling  GASTROINTESTINAL: No abdominal or epigastric pain. No nausea, vomiting, or hematemesis; No diarrhea or constipation. No melena or hematochezia.  GENITOURINARY: No dysuria, frequency, hematuria, or incontinence  NEUROLOGICAL: No headaches, memory loss, loss of strength, numbness, or tremors  SKIN: No itching, burning, rashes, or lesions   LYMPH NODES: No enlarged glands  ENDOCRINE: No heat or cold intolerance; No hair loss  MUSCULOSKELETAL: No joint pain or swelling; No muscle, back, or extremity pain  PSYCHIATRIC: No depression, anxiety, mood swings, or difficulty sleeping  HEME/LYMPH: No easy bruising, or bleeding gums  ALLERY AND IMMUNOLOGIC: No hives or eczema  FAMILY HISTORY:    T(C): 36 (03-14-19 @ 12:05), Max: 36.7 (03-13-19 @ 20:43)  HR: 67 (03-14-19 @ 12:05) (63 - 74)  BP: 112/61 (03-14-19 @ 12:05) (110/58 - 118/58)  RR: 18 (03-14-19 @ 12:05) (18 - 18)  SpO2: --  Wt(kg): --Vital Signs Last 24 Hrs  T(C): 36 (14 Mar 2019 12:05), Max: 36.7 (13 Mar 2019 20:43)  T(F): 96.8 (14 Mar 2019 12:05), Max: 98 (13 Mar 2019 20:43)  HR: 67 (14 Mar 2019 12:05) (63 - 74)  BP: 112/61 (14 Mar 2019 12:05) (110/58 - 118/58)  BP(mean): --  RR: 18 (14 Mar 2019 12:05) (18 - 18)  SpO2: --    PHYSICAL EXAM:  GENERAL: NAD, well-groomed, well-developed  HEAD:  Atraumatic, Normocephalic  EYES: EOMI, PERRLA, conjunctiva and sclera clear  ENMT: No tonsillar erythema, exudates, or enlargement; Moist mucous membranes, Good dentition, No lesions  NECK: Supple, No JVD, Normal thyroid  NERVOUS SYSTEM:  Alert & Oriented X3, Good concentration; Motor Strength 5/5 B/L upper and lower extremities; DTRs 2+ intact and symmetric  PULM: Clear to auscultation bilaterally  CARDIAC: Regular rate and rhythm; No murmurs, rubs, or gallops  GI: Soft, Nontender, Nondistended; Bowel sounds present  EXTREMITIES: bilateral wrapped lower extremities, no surrounding erythema or edema   LYMPH: No lymphadenopathy noted  SKIN: No rashes or lesions    Consultant(s) Notes Reviewed:  [x ] YES  [ ] NO  Care Discussed with Consultants/Other Providers [ x] YES  [ ] NO    LABS:                            10.8   3.56  )-----------( 337      ( 14 Mar 2019 06:59 )             34.3   03-13    138  |  102  |  21<H>  ----------------------------<  108<H>  4.6   |  26  |  0.7    Ca    8.8      13 Mar 2019 07:06  Mg     2.3     03-13              acetaminophen   Tablet .. 650 milliGRAM(s) Oral every 8 hours PRN  aspirin enteric coated 81 milliGRAM(s) Oral daily  chlorhexidine 4% Liquid 1 Application(s) Topical <User Schedule>  collagenase Ointment 1 Application(s) Topical two times a day  docusate sodium 100 milliGRAM(s) Oral three times a day  fentaNYL   Patch  12 MICROgram(s)/Hr 1 Patch Transdermal every 72 hours  gabapentin 300 milliGRAM(s) Oral at bedtime  heparin  Injectable 5000 Unit(s) SubCutaneous every 8 hours  losartan 25 milliGRAM(s) Oral daily  OLANZapine 2.5 milliGRAM(s) Oral two times a day  senna 2 Tablet(s) Oral at bedtime  simvastatin 20 milliGRAM(s) Oral at bedtime      HEALTH ISSUES - PROBLEM Dx:  Suspected deep vein thrombosis (DVT)          Case Discussed with House Staff   45 minutes spent on total encounter; more than 50% of the visit was spent counseling and/or coordinating care by the attending physician.   Spectra [id number] KALI ANDERS  85y  [facility]      Patient is a 85y old  Female who presents with a chief complaint of chronic LLE ulcer (12 Mar 2019 10:20)      INTERVAL HPI/OVERNIGHT EVENTS:  no events overnight      REVIEW OF SYSTEMS:  CONSTITUTIONAL: No fever, weight loss, or fatigue  EYES: No eye pain, visual disturbances, or discharge  ENMT:  No difficulty hearing, tinnitus, vertigo; No sinus or throat pain  NECK: No pain or stiffness  BREASTS: No pain, masses, or nipple discharge  RESPIRATORY: No cough, wheezing, chills or hemoptysis; No shortness of breath  CARDIOVASCULAR: No chest pain, palpitations, dizziness, or leg swelling  GASTROINTESTINAL: No abdominal or epigastric pain. No nausea, vomiting, or hematemesis; No diarrhea or constipation. No melena or hematochezia.  GENITOURINARY: No dysuria, frequency, hematuria, or incontinence  NEUROLOGICAL: No headaches, memory loss, loss of strength, numbness, or tremors  SKIN: No itching, burning, rashes, or lesions   LYMPH NODES: No enlarged glands  ENDOCRINE: No heat or cold intolerance; No hair loss  MUSCULOSKELETAL: No joint pain or swelling; No muscle, back, or extremity pain  PSYCHIATRIC: No depression, anxiety, mood swings, or difficulty sleeping  HEME/LYMPH: No easy bruising, or bleeding gums  ALLERY AND IMMUNOLOGIC: No hives or eczema  FAMILY HISTORY:    T(C): 36 (03-14-19 @ 12:05), Max: 36.7 (03-13-19 @ 20:43)  HR: 67 (03-14-19 @ 12:05) (63 - 74)  BP: 112/61 (03-14-19 @ 12:05) (110/58 - 118/58)  RR: 18 (03-14-19 @ 12:05) (18 - 18)  SpO2: --  Wt(kg): --Vital Signs Last 24 Hrs  T(C): 36 (14 Mar 2019 12:05), Max: 36.7 (13 Mar 2019 20:43)  T(F): 96.8 (14 Mar 2019 12:05), Max: 98 (13 Mar 2019 20:43)  HR: 67 (14 Mar 2019 12:05) (63 - 74)  BP: 112/61 (14 Mar 2019 12:05) (110/58 - 118/58)  BP(mean): --  RR: 18 (14 Mar 2019 12:05) (18 - 18)  SpO2: --    PHYSICAL EXAM:  GENERAL: NAD, well-groomed, well-developed  HEAD:  Atraumatic, Normocephalic  EYES: EOMI, PERRLA, conjunctiva and sclera clear  ENMT: No tonsillar erythema, exudates, or enlargement; Moist mucous membranes, Good dentition, No lesions  NECK: Supple, No JVD, Normal thyroid  NERVOUS SYSTEM:  Alert & Oriented X3, Good concentration; Motor Strength 5/5 B/L upper and lower extremities; DTRs 2+ intact and symmetric  PULM: Clear to auscultation bilaterally  CARDIAC: Regular rate and rhythm; No murmurs, rubs, or gallops  GI: Soft, Nontender, Nondistended; Bowel sounds present  EXTREMITIES: bilateral wrapped lower extremities, no surrounding erythema or edema   LYMPH: No lymphadenopathy noted  SKIN: No rashes or lesions    Consultant(s) Notes Reviewed:  [x ] YES  [ ] NO  Care Discussed with Consultants/Other Providers [ x] YES  [ ] NO    LABS:                            10.8   3.56  )-----------( 337      ( 14 Mar 2019 06:59 )             34.3   03-13    138  |  102  |  21<H>  ----------------------------<  108<H>  4.6   |  26  |  0.7    Ca    8.8      13 Mar 2019 07:06  Mg     2.3     03-13              acetaminophen   Tablet .. 650 milliGRAM(s) Oral every 8 hours PRN  aspirin enteric coated 81 milliGRAM(s) Oral daily  chlorhexidine 4% Liquid 1 Application(s) Topical <User Schedule>  collagenase Ointment 1 Application(s) Topical two times a day  docusate sodium 100 milliGRAM(s) Oral three times a day  fentaNYL   Patch  12 MICROgram(s)/Hr 1 Patch Transdermal every 72 hours  gabapentin 300 milliGRAM(s) Oral at bedtime  heparin  Injectable 5000 Unit(s) SubCutaneous every 8 hours  losartan 25 milliGRAM(s) Oral daily  OLANZapine 2.5 milliGRAM(s) Oral two times a day  senna 2 Tablet(s) Oral at bedtime  simvastatin 20 milliGRAM(s) Oral at bedtime      HEALTH ISSUES - PROBLEM Dx:  Suspected deep vein thrombosis (DVT)          Case Discussed with House Staff   45 minutes spent on total encounter; more than 50% of the visit was spent counseling and/or coordinating care by the attending physician.   E[employer] [phone]

## 2019-03-14 NOTE — PROGRESS NOTE ADULT - ASSESSMENT
#Left lower extremity stasis ulcer sp angiogram with SFA angioplasty   outpatient vascular follow up   #Kaposi sarcoma   op follow up at [facility]   #. Mild tricuspid regurgitation.  # Pulmonic valve regurgitation.  #CKD 2   Discussed with family :son Berry, , and patient , agreed for dc home ,   [place] - home pt to be arranged #Left lower extremity stasis ulcer sp angiogram with SFA angioplasty   outpatient vascular follow up   #Kaposi sarcoma   op follow up at [facility]   #. Mild tricuspid regurgitation.  # Pulmonic valve regurgitation.  #CKD 2   Discussed with family :son Berry, , and patient , agreed for [place] home ,   [place] - home pt to be arranged  MED REC reviewed  time spent coordinating care and reviewing med 45 min

## 2019-03-15 PROBLEM — I10 ESSENTIAL (PRIMARY) HYPERTENSION: Chronic | Status: ACTIVE | Noted: 2019-03-05

## 2019-03-15 PROBLEM — M79.89 OTHER SPECIFIED SOFT TISSUE DISORDERS: Chronic | Status: ACTIVE | Noted: 2019-03-05

## 2019-03-15 PROBLEM — L97.321 NON-PRESSURE CHRONIC ULCER OF LEFT ANKLE LIMITED TO BREAKDOWN OF SKIN: Chronic | Status: ACTIVE | Noted: 2019-03-05

## 2019-03-18 DIAGNOSIS — I12.9 HYPERTENSIVE CHRONIC KIDNEY DISEASE WITH STAGE 1 THROUGH STAGE 4 CHRONIC KIDNEY DISEASE, OR UNSPECIFIED CHRONIC KIDNEY DISEASE: ICD-10-CM

## 2019-03-18 DIAGNOSIS — M79.672 PAIN IN LEFT FOOT: ICD-10-CM

## 2019-03-18 DIAGNOSIS — L97.321 NON-PRESSURE CHRONIC ULCER OF LEFT ANKLE LIMITED TO BREAKDOWN OF SKIN: ICD-10-CM

## 2019-03-18 DIAGNOSIS — I70.202 UNSPECIFIED ATHEROSCLEROSIS OF NATIVE ARTERIES OF EXTREMITIES, LEFT LEG: ICD-10-CM

## 2019-03-18 DIAGNOSIS — I87.2 VENOUS INSUFFICIENCY (CHRONIC) (PERIPHERAL): ICD-10-CM

## 2019-03-18 DIAGNOSIS — I36.1 NONRHEUMATIC TRICUSPID (VALVE) INSUFFICIENCY: ICD-10-CM

## 2019-03-18 DIAGNOSIS — I37.1 NONRHEUMATIC PULMONARY VALVE INSUFFICIENCY: ICD-10-CM

## 2019-03-18 DIAGNOSIS — Z92.21 PERSONAL HISTORY OF ANTINEOPLASTIC CHEMOTHERAPY: ICD-10-CM

## 2019-03-18 DIAGNOSIS — C46.9 KAPOSI'S SARCOMA, UNSPECIFIED: ICD-10-CM

## 2019-03-18 DIAGNOSIS — N18.2 CHRONIC KIDNEY DISEASE, STAGE 2 (MILD): ICD-10-CM

## 2019-03-18 DIAGNOSIS — L97.521 NON-PRESSURE CHRONIC ULCER OF OTHER PART OF LEFT FOOT LIMITED TO BREAKDOWN OF SKIN: ICD-10-CM

## 2019-03-18 DIAGNOSIS — F03.90 UNSPECIFIED DEMENTIA WITHOUT BEHAVIORAL DISTURBANCE: ICD-10-CM

## 2019-03-18 DIAGNOSIS — D64.9 ANEMIA, UNSPECIFIED: ICD-10-CM

## 2019-03-18 DIAGNOSIS — F05 DELIRIUM DUE TO KNOWN PHYSIOLOGICAL CONDITION: ICD-10-CM

## 2019-03-21 ENCOUNTER — EMERGENCY (EMERGENCY)
Facility: HOSPITAL | Age: 84
LOS: 0 days | Discharge: HOME | End: 2019-03-21
Attending: EMERGENCY MEDICINE | Admitting: EMERGENCY MEDICINE

## 2019-03-21 VITALS — HEIGHT: 62 IN | WEIGHT: 149.91 LBS

## 2019-03-21 VITALS
OXYGEN SATURATION: 98 % | SYSTOLIC BLOOD PRESSURE: 154 MMHG | HEART RATE: 75 BPM | RESPIRATION RATE: 18 BRPM | TEMPERATURE: 99 F | DIASTOLIC BLOOD PRESSURE: 73 MMHG

## 2019-03-21 DIAGNOSIS — M79.662 PAIN IN LEFT LOWER LEG: ICD-10-CM
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
## 2019-03-21 DIAGNOSIS — L97.921 NON-PRESSURE CHRONIC ULCER OF UNSPECIFIED PART OF LEFT LOWER LEG LIMITED TO BREAKDOWN OF SKIN: ICD-10-CM

## 2019-03-21 DIAGNOSIS — Z88.1 ALLERGY STATUS TO OTHER ANTIBIOTIC AGENTS STATUS: ICD-10-CM

## 2019-03-21 NOTE — ED PROVIDER NOTE - OBJECTIVE STATEMENT
History: Patient states that history began three years ago. She was taking the trash outside while it was raining and fell down 15 steps injuring both her legs. The right lower extremity has healed well however the left lower extremity has not. She has been following with a podiatrist for chronic ulcers she has developed. Patient has been having right foot pain x 3 days. She said that last night the pain became very severe. She tried to take two Tylenol which usually helps to alleviate the pain however, this time it did not improve. She went to her podiatrist in AM and had a debridement done. She was then instructed to come to the hospital for IV antibiotics and to r/o cellulitis. Patient denies f/n/v/d/c, chills, headache, dizziness, chest pain, sob, abdominal pain, back pain. Admits to severe sharp pain when attempting to ambulate. (05 Mar 2019 16:27) 85 y.o. female, PMH of chronic venous stasis ulcer comes in stating that her left LE is oozing more than usual and she ran out of supplies. No fever/chills, CP/SOB. No pain to extremities. Following with podiatrist with her chronic ulcers. Pt is ambulating around the house without difficulty.

## 2019-03-21 NOTE — ED PROVIDER NOTE - CARE PROVIDER_API CALL
Wilian Crenshaw)  Surgery; Vascular Surgery  [address] [place] [address]  Phone: [phone]  Fax: [phone]  Follow Up Time:

## 2019-03-21 NOTE — ED PROVIDER NOTE - CARE PLAN
Principal Discharge DX:	Skin ulcer of left ankle, limited to breakdown of skin  Secondary Diagnosis:	Leg swelling

## 2019-03-21 NOTE — ED PROVIDER NOTE - CLINICAL SUMMARY MEDICAL DECISION MAKING FREE TEXT BOX
Pt with chronic ulcers c/o oozing from extremities. No evidence of cellulitis. Sensation intact. Pulses intact. Dressings changed. Will discharge with vascular sx follow up. Advised to return for worsening of symptoms.

## 2019-03-21 NOTE — ED PROVIDER NOTE - PHYSICAL EXAMINATION
pt in NAD, AAOx3,   head NC/AT,   CN II-XII intact,   lungs CTA B/L,   CV S1S2 regular,   abdomen soft/NT/ND/(+)BS,   skin:   -ulcer over medial aspect of left LE, no warmth/erythema/streaking, good gradulation tissue. Ulcer is about 2x2cm. (+) edema. (-) purulence.   -ulcer over lateral aspect of left LE. 3x2cm. (+) edema. (-) streaking/erythema/warmth.    - (+) chronic skin changes over b/l LE.  -decreased pulses  Motor 5/5, able to ambulate pt in NAD, AAOx3,   head NC/AT,   CN II-XII intact,   lungs CTA B/L,   CV S1S2 regular,   abdomen soft/NT/ND/(+)BS,   skin:   -ulcer over medial aspect of left LE, no warmth/erythema/streaking, good granulation tissue. Ulcer is about 2x2cm. (+) edema. (-) purulence.   -ulcer over lateral aspect of left LE. 3x2cm. (+) edema. (-) streaking/erythema/warmth.    - (+) chronic skin changes over b/l LE.  -decreased pulses  Motor 5/5, able to ambulate

## 2019-03-21 NOTE — ED ADULT NURSE NOTE - PMH
HTN (hypertension)    Leg swelling    PVD (peripheral vascular disease)    Skin ulcer of left ankle, limited to breakdown of skin

## 2019-03-21 NOTE — ED PROVIDER NOTE - NSFOLLOWUPINSTRUCTIONS_ED_ALL_ED_FT
Please follow up with vascular surgeon. Return for worsening of symptoms.    Patient to be discharged from ED. Any available test results were discussed with patient and/or family. Verbal instructions given, including instructions to return to ED immediately for any new, worsening, or concerning symptoms. Patient endorsed understanding. Written discharge instructions additionally given, including follow-up plan.

## 2019-03-25 ENCOUNTER — INBOUND DOCUMENT (OUTPATIENT)
Age: 84
End: 2019-03-25

## 2019-03-28 ENCOUNTER — APPOINTMENT (OUTPATIENT)
Dept: VASCULAR SURGERY | Facility: CLINIC | Age: 84
End: 2019-03-28
Payer: MEDICARE

## 2019-03-28 VITALS
SYSTOLIC BLOOD PRESSURE: 150 MMHG | WEIGHT: 150 LBS | HEIGHT: 63 IN | BODY MASS INDEX: 26.58 KG/M2 | DIASTOLIC BLOOD PRESSURE: 80 MMHG

## 2019-03-28 DIAGNOSIS — I70.245 ATHEROSCLEROSIS OF NATIVE ARTERIES OF LEFT LEG WITH ULCERATION OF OTHER PART OF FOOT: ICD-10-CM

## 2019-03-28 DIAGNOSIS — I83.029 VARICOSE VEINS OF LEFT LOWER EXTREMITY WITH ULCER OF UNSPECIFIED SITE: ICD-10-CM

## 2019-03-28 DIAGNOSIS — E78.00 PURE HYPERCHOLESTEROLEMIA, UNSPECIFIED: ICD-10-CM

## 2019-03-28 DIAGNOSIS — L97.929 VARICOSE VEINS OF LEFT LOWER EXTREMITY WITH ULCER OF UNSPECIFIED SITE: ICD-10-CM

## 2019-03-28 PROBLEM — I73.9 PERIPHERAL VASCULAR DISEASE, UNSPECIFIED: Chronic | Status: ACTIVE | Noted: 2019-03-21

## 2019-03-28 PROCEDURE — 99213 OFFICE O/P EST LOW 20 MIN: CPT

## 2019-03-28 PROCEDURE — 93926 LOWER EXTREMITY STUDY: CPT

## 2019-03-28 RX ORDER — OLANZAPINE 20 MG/1
TABLET ORAL
Refills: 0 | Status: ACTIVE | COMMUNITY

## 2019-03-28 RX ORDER — ASPIRIN 81 MG
81 TABLET, DELAYED RELEASE (ENTERIC COATED) ORAL
Refills: 0 | Status: ACTIVE | COMMUNITY

## 2019-03-28 RX ORDER — FENTANYL 12 UG/H
12 PATCH, EXTENDED RELEASE TRANSDERMAL
Refills: 0 | Status: ACTIVE | COMMUNITY

## 2019-03-28 RX ORDER — COLLAGENASE SANTYL 250 [ARB'U]/G
250 OINTMENT TOPICAL
Refills: 0 | Status: ACTIVE | COMMUNITY

## 2019-03-28 RX ORDER — GABAPENTIN 100 MG/1
CAPSULE ORAL
Refills: 0 | Status: ACTIVE | COMMUNITY

## 2019-03-28 RX ORDER — SIMVASTATIN 80 MG/1
TABLET, FILM COATED ORAL
Refills: 0 | Status: ACTIVE | COMMUNITY

## 2019-03-28 RX ORDER — VALSARTAN 40 MG/1
TABLET, COATED ORAL
Refills: 0 | Status: ACTIVE | COMMUNITY

## 2019-03-28 NOTE — ASSESSMENT
[FreeTextEntry1] : 84 y/o female with h/o PVD, chronic wounds to left lower extremity for the past 3 years that she developed after a fall, and never healed, has chronic leg swelling, has VNS for daily wound care with Xeroform dressing, c/o significant drainage from the wound, was admitted for wound infection, found to have left SFA stenosis and underwent left SFA angioplasty on 3/12/19. I performed an arterial duplex which was medically necessary to evaluate for patency of the angioplasty site. It showed patent left femoropopliteal arteries without any restenosis. I have informed her of the test results and advised to continue with daily wound care. I will see her back in 4 weeks for wound check.

## 2019-03-28 NOTE — HISTORY OF PRESENT ILLNESS
[FreeTextEntry1] : 86 y/o female with h/o PVD, chronic wounds to left lower extremity for the past 3 years that she developed after a fall, and never healed, has chronic leg swelling, has VNS for daily wound care with Xeroform dressing, c/o significant drainage from the wound, was admitted for wound infection, found to have left SFA stenosis and underwent left SFA angioplasty on 3/12/19.

## 2019-03-28 NOTE — DATA REVIEWED
[FreeTextEntry1] : I performed an arterial duplex which was medically necessary to evaluate for patency of the angioplasty site. It showed patent left femoropopliteal arteries without any restenosis.\par

## 2019-04-20 LAB
ESTIMATED AVERAGE GLUCOSE: 120 MG/DL — HIGH (ref 68–114)
HBA1C BLD-MCNC: 5.8 % — HIGH (ref 4–5.6)

## 2019-04-21 ENCOUNTER — INPATIENT (INPATIENT)
Facility: HOSPITAL | Age: 84
LOS: 7 days | Discharge: NURSING, REHAB OR LONG-TERM CARE | End: 2019-04-29
Attending: INTERNAL MEDICINE | Admitting: INTERNAL MEDICINE
Payer: MEDICARE

## 2019-04-21 VITALS
DIASTOLIC BLOOD PRESSURE: 73 MMHG | TEMPERATURE: 96 F | HEART RATE: 73 BPM | RESPIRATION RATE: 18 BRPM | OXYGEN SATURATION: 99 % | SYSTOLIC BLOOD PRESSURE: 145 MMHG

## 2019-04-21 LAB
ALBUMIN SERPL ELPH-MCNC: 3.9 G/DL — SIGNIFICANT CHANGE UP (ref 3.5–5.2)
ALP SERPL-CCNC: 107 U/L — SIGNIFICANT CHANGE UP (ref 30–115)
ALT FLD-CCNC: 7 U/L — SIGNIFICANT CHANGE UP (ref 0–41)
ANION GAP SERPL CALC-SCNC: 11 MMOL/L — SIGNIFICANT CHANGE UP (ref 7–14)
ANISOCYTOSIS BLD QL: SLIGHT — SIGNIFICANT CHANGE UP
AST SERPL-CCNC: 14 U/L — SIGNIFICANT CHANGE UP (ref 0–41)
BASOPHILS # BLD AUTO: 0.05 K/UL — SIGNIFICANT CHANGE UP (ref 0–0.2)
BASOPHILS NFR BLD AUTO: 0.9 % — SIGNIFICANT CHANGE UP (ref 0–1)
BILIRUB SERPL-MCNC: 0.2 MG/DL — SIGNIFICANT CHANGE UP (ref 0.2–1.2)
BUN SERPL-MCNC: 17 MG/DL — SIGNIFICANT CHANGE UP (ref 10–20)
CALCIUM SERPL-MCNC: 9.1 MG/DL — SIGNIFICANT CHANGE UP (ref 8.5–10.1)
CHLORIDE SERPL-SCNC: 103 MMOL/L — SIGNIFICANT CHANGE UP (ref 98–110)
CO2 SERPL-SCNC: 28 MMOL/L — SIGNIFICANT CHANGE UP (ref 17–32)
CREAT SERPL-MCNC: 0.7 MG/DL — SIGNIFICANT CHANGE UP (ref 0.7–1.5)
EOSINOPHIL # BLD AUTO: 0.36 K/UL — SIGNIFICANT CHANGE UP (ref 0–0.7)
EOSINOPHIL NFR BLD AUTO: 7.1 % — SIGNIFICANT CHANGE UP (ref 0–8)
ERYTHROCYTE [SEDIMENTATION RATE] IN BLOOD: 41 MM/HR — HIGH (ref 0–20)
GIANT PLATELETS BLD QL SMEAR: PRESENT — SIGNIFICANT CHANGE UP
GLUCOSE SERPL-MCNC: 126 MG/DL — HIGH (ref 70–99)
HCT VFR BLD CALC: 38.1 % — SIGNIFICANT CHANGE UP (ref 37–47)
HGB BLD-MCNC: 12 G/DL — SIGNIFICANT CHANGE UP (ref 12–16)
LACTATE SERPL-SCNC: 1.3 MMOL/L — SIGNIFICANT CHANGE UP (ref 0.5–2.2)
LYMPHOCYTES # BLD AUTO: 0.31 K/UL — LOW (ref 1.2–3.4)
LYMPHOCYTES # BLD AUTO: 6.2 % — LOW (ref 20.5–51.1)
MANUAL SMEAR VERIFICATION: SIGNIFICANT CHANGE UP
MCHC RBC-ENTMCNC: 30 PG — SIGNIFICANT CHANGE UP (ref 27–31)
MCHC RBC-ENTMCNC: 31.5 G/DL — LOW (ref 32–37)
MCV RBC AUTO: 95.3 FL — SIGNIFICANT CHANGE UP (ref 81–99)
MICROCYTES BLD QL: SLIGHT — SIGNIFICANT CHANGE UP
MONOCYTES # BLD AUTO: 0.4 K/UL — SIGNIFICANT CHANGE UP (ref 0.1–0.6)
MONOCYTES NFR BLD AUTO: 7.9 % — SIGNIFICANT CHANGE UP (ref 1.7–9.3)
NEUTROPHILS # BLD AUTO: 3.86 K/UL — SIGNIFICANT CHANGE UP (ref 1.4–6.5)
NEUTROPHILS NFR BLD AUTO: 77 % — HIGH (ref 42.2–75.2)
PLAT MORPH BLD: ABNORMAL
PLATELET # BLD AUTO: 287 K/UL — SIGNIFICANT CHANGE UP (ref 130–400)
POIKILOCYTOSIS BLD QL AUTO: SLIGHT — SIGNIFICANT CHANGE UP
POLYCHROMASIA BLD QL SMEAR: SLIGHT — SIGNIFICANT CHANGE UP
POTASSIUM SERPL-MCNC: 4.5 MMOL/L — SIGNIFICANT CHANGE UP (ref 3.5–5)
POTASSIUM SERPL-SCNC: 4.5 MMOL/L — SIGNIFICANT CHANGE UP (ref 3.5–5)
PROT SERPL-MCNC: 6.4 G/DL — SIGNIFICANT CHANGE UP (ref 6–8)
RBC # BLD: 4 M/UL — LOW (ref 4.2–5.4)
RBC # FLD: 13.8 % — SIGNIFICANT CHANGE UP (ref 11.5–14.5)
RBC BLD AUTO: ABNORMAL
SODIUM SERPL-SCNC: 142 MMOL/L — SIGNIFICANT CHANGE UP (ref 135–146)
VARIANT LYMPHS # BLD: 0.9 % — SIGNIFICANT CHANGE UP (ref 0–5)
WBC # BLD: 5.01 K/UL — SIGNIFICANT CHANGE UP (ref 4.8–10.8)
WBC # FLD AUTO: 5.01 K/UL — SIGNIFICANT CHANGE UP (ref 4.8–10.8)

## 2019-04-21 PROCEDURE — 73630 X-RAY EXAM OF FOOT: CPT | Mod: 26,LT

## 2019-04-21 PROCEDURE — 73590 X-RAY EXAM OF LOWER LEG: CPT | Mod: 26,LT

## 2019-04-21 PROCEDURE — 93971 EXTREMITY STUDY: CPT | Mod: 26,LT

## 2019-04-21 PROCEDURE — 93926 LOWER EXTREMITY STUDY: CPT | Mod: 26,LT

## 2019-04-21 PROCEDURE — 99285 EMERGENCY DEPT VISIT HI MDM: CPT | Mod: GC

## 2019-04-21 RX ORDER — ACETAMINOPHEN 500 MG
650 TABLET ORAL ONCE
Qty: 0 | Refills: 0 | Status: COMPLETED | OUTPATIENT
Start: 2019-04-21 | End: 2019-04-21

## 2019-04-21 RX ADMIN — Medication 650 MILLIGRAM(S): at 21:55

## 2019-04-21 NOTE — CONSULT NOTE ADULT - ASSESSMENT
---------------------------------------------------------------------------------------    ASSESSMENT:  85y Female  left SFA angioplasty on 3/12/19 presents with chronic ulceration to LLE    PLAN:   ·	Palpable DP pulses bilaterally  ·	Venous duplex negative  ·	Arterial duplex pending read  ·	No acute vascular intervention indicated at this time  ·	Continue local wound care and treatment with antibiotics for erythema and possible cellulitis of LLE    --------------------------------------------------------------------------------------

## 2019-04-21 NOTE — ED PROVIDER NOTE - OBJECTIVE STATEMENT
Patient is an 86 yo F w/ hx of HTN and PVD followed by Dr. Crenshaw p/w increased discharge from chronic ulcer on LLE. Patient has had this ulceration of the LLE for 3 years, p/w increased clear discharge x 1 day and increased pain; pain is intermittent, moderate, non-radiating, exacerbated by ambulation of LLE extremity. Patient denies fevers. Ran out of fentanyl patch. Has upcoming appointment with Dr. Crenshaw in 4 days. Patient is an 84 yo F w/ hx of karposi sarcoma, HTN and PVD followed by Dr. Crenshaw p/w increased discharge from chronic ulcer on LLE. Patient has had this ulceration of the LLE for 3 years, p/w increased clear discharge x 1 day and increased pain; pain is intermittent, moderate, non-radiating, exacerbated by ambulation of LLE extremity. Patient denies fevers. Ran out of fentanyl patch. Has upcoming appointment with Dr. Crenshaw in 4 days. Patient is an 86 yo F w/ hx of karposi sarcoma on chemotherapy, HTN and PVD followed by Dr. Crenshaw p/w increased discharge from chronic ulcer on LLE. Patient has had this ulceration of the LLE for 3 years, p/w increased clear discharge x 1 day and increased pain; pain is intermittent, moderate, non-radiating, exacerbated by ambulation of LLE extremity. Patient denies fevers. Ran out of fentanyl patch. Has upcoming appointment with Dr. Crenshaw in 4 days.

## 2019-04-21 NOTE — ED PROVIDER NOTE - CLINICAL SUMMARY MEDICAL DECISION MAKING FREE TEXT BOX
evaluated for non healing ulcer which has increased in size with erythema and extension in to left achilles tendon. concern for infection and need for debridemnt, started on iv abx, burn consulted and will likely debride, patient admitted for wound management.

## 2019-04-21 NOTE — ED PROVIDER NOTE - NS ED ROS FT
Constitutional: No fevers.   Eyes:  No visual changes, eye pain or discharge.  ENMT:  No congestion.   Cardiac:  No chest pain.  Respiratory:  No cough or respiratory distress.   GI:  No nausea, vomiting, diarrhea or abdominal pain.  :  No dysuria, frequency or burning.  MS:  LLE ulcer and pain and discharge.   Neuro:  No headache or weakness.  No LOC.  Skin:  No skin rash.   Endocrine: No history of thyroid disease or diabetes. Constitutional: No fevers.   Eyes:  No visual changes, eye pain or discharge.  ENMT:  No congestion.   Cardiac:  No chest pain.  Respiratory:  No cough or respiratory distress.   GI:  No nausea, vomiting, diarrhea or abdominal pain.  :  No dysuria, frequency or burning.  MS:  LLE ulcer and pain and discharge.   Neuro:  No headache or weakness.  No LOC.  Skin:  ulcer on left lower extremity.   Endocrine: No history of thyroid disease or diabetes.

## 2019-04-21 NOTE — ED PROVIDER NOTE - CARE PLAN
Principal Discharge DX:	Skin ulcer of left calf, with unspecified severity  Secondary Diagnosis:	Cellulitis

## 2019-04-21 NOTE — CONSULT NOTE ADULT - SUBJECTIVE AND OBJECTIVE BOX
Consultation Note  =====================================================    HPI: 85y Female with PMH significant for PVD, chronic wounds to the left lower extremity, HTN, presents to the ED with worsening ulceration to the LLE. Patient has had chronic wounds on the left ankle that she developed after a fall, never healed, and chronic leg swelling. She complains of significant drainage from the wound. During her last admission, she had been admitted for wound infection. During work up, she was found to have left SFA stenosis and underwent left SFA angioplasty on 3/12/19 with Dr. Crenshaw. Outpatient arterial duplex performed during postoperative visit 03/28 showed patent left femoropopliteal arteries without any restenosis.       PAST MEDICAL & SURGICAL HISTORY:  PVD (peripheral vascular disease)  Skin ulcer of left ankle, limited to breakdown of skin  Leg swelling  HTN (hypertension)    Home Meds: Home Medications:  fentaNYL 12 mcg/hr transdermal film, extended release: 1 patch transdermal every 72 hours, As Needed for pain (14 Mar 2019 12:04)  gabapentin 300 mg oral capsule: 1 cap(s) orally once a day (at bedtime) (14 Mar 2019 12:04)  OLANZapine 2.5 mg oral tablet: 1 tab(s) orally 2 times a day (14 Mar 2019 12:04)  valsartan 40 mg oral tablet: 40 milligram(s) orally once a day (05 Mar 2019 17:50)    Allergies: Allergies  vancomycin (Flushing)    ROS:    REVIEW OF SYSTEMS    [X] A ten-point review of systems was otherwise negative except as noted.  [ ] Due to altered mental status/intubation, subjective information were not able to be obtained from the patient. History was obtained, to the extent possible, from review of the chart and collateral sources of information.    -------------------------------------------------------------------------------------  VITAL SIGNS, INS/OUTS (last 24 hours):  --------------------------------------------------------------------------------------  ICU Vital Signs Last 24 Hrs  T(C): 35.7 (21 Apr 2019 18:28), Max: 35.7 (21 Apr 2019 18:28)  T(F): 96.3 (21 Apr 2019 18:28), Max: 96.3 (21 Apr 2019 18:28)  HR: 73 (21 Apr 2019 18:28) (73 - 73)  BP: 145/73 (21 Apr 2019 18:28) (145/73 - 145/73)  RR: 18 (21 Apr 2019 18:28) (18 - 18)  SpO2: 99% (21 Apr 2019 18:28) (99% - 99%)    --------------------------------------------------------------------------------------  PHYSICAL EXAM  General: NAD, AAOx3, calm and cooperative  HEENT: NCAT, SHEBA, EOMI, Trachea ML, Neck supple  Cardiac: RRR S1, S2, no Murmurs, rubs or gallops  Respiratory: CTAB, normal respiratory effort, breath sounds equal BL, no wheeze, rhonchi or crackles  Abdomen: Soft, non-distended, non-tender, +bowel sounds  Vascular: Pulses 2+ throughout, extremities well perfused. DP pulses palpable bilaterally  Extremities: B/L lower extremity edema, wound to the left medial lower leg with fibrous exudate, venous stasis skin changes    LABS  --------------------------------------------------------------------------------------  Labs:  CAPILLARY BLOOD GLUCOSE                              12.0   5.01  )-----------( 452      ( 21 Apr 2019 20:00 )             38.1       Auto Neutrophil %: 77.0 % (04-21-19 @ 20:00)    04-21    142  |  103  |  17  ----------------------------<  126<H>  4.5   |  28  |  0.7      Calcium, Total Serum: 9.1 mg/dL (04-21-19 @ 20:00)      LFTs:             6.4  | 0.2  | 14       ------------------[107     ( 21 Apr 2019 20:00 )  3.9  | x    | 7              Lactate, Blood: 1.3 mmol/L (04-21-19 @ 20:00)    --------------------------------------------------------------------------------------  IMAGING RESULTS        ---------------------------------------------------------------------------------------    ASSESSMENT:  85y Female  left SFA angioplasty on 3/12/19 presents with chronic ulceration to LLE    PLAN:       -------------------------------------------------------------------------------------- Consultation Note  =====================================================    HPI: 85y Female with PMH significant for PVD, chronic wounds to the left lower extremity, HTN, presents to the ED with worsening ulceration to the LLE. Patient has had chronic wounds on the left ankle that she developed after a fall, never healed, and chronic leg swelling. She complains of significant drainage from the wound. During her last admission, she had been admitted for wound infection. During work up, she was found to have left SFA stenosis and underwent left SFA angioplasty on 3/12/19 with Dr. Crenshaw. Outpatient arterial duplex performed during postoperative visit 03/28 showed patent left femoropopliteal arteries without any restenosis.       PAST MEDICAL & SURGICAL HISTORY:  PVD (peripheral vascular disease)  Skin ulcer of left ankle, limited to breakdown of skin  Leg swelling  HTN (hypertension)    Home Meds: Home Medications:  fentaNYL 12 mcg/hr transdermal film, extended release: 1 patch transdermal every 72 hours, As Needed for pain (14 Mar 2019 12:04)  gabapentin 300 mg oral capsule: 1 cap(s) orally once a day (at bedtime) (14 Mar 2019 12:04)  OLANZapine 2.5 mg oral tablet: 1 tab(s) orally 2 times a day (14 Mar 2019 12:04)  valsartan 40 mg oral tablet: 40 milligram(s) orally once a day (05 Mar 2019 17:50)    Allergies: Allergies  vancomycin (Flushing)    ROS:    REVIEW OF SYSTEMS    [X] A ten-point review of systems was otherwise negative except as noted.  [ ] Due to altered mental status/intubation, subjective information were not able to be obtained from the patient. History was obtained, to the extent possible, from review of the chart and collateral sources of information.    -------------------------------------------------------------------------------------  VITAL SIGNS, INS/OUTS (last 24 hours):  --------------------------------------------------------------------------------------  ICU Vital Signs Last 24 Hrs  T(C): 35.7 (21 Apr 2019 18:28), Max: 35.7 (21 Apr 2019 18:28)  T(F): 96.3 (21 Apr 2019 18:28), Max: 96.3 (21 Apr 2019 18:28)  HR: 73 (21 Apr 2019 18:28) (73 - 73)  BP: 145/73 (21 Apr 2019 18:28) (145/73 - 145/73)  RR: 18 (21 Apr 2019 18:28) (18 - 18)  SpO2: 99% (21 Apr 2019 18:28) (99% - 99%)    --------------------------------------------------------------------------------------  PHYSICAL EXAM  General: NAD, AAOx3, calm and cooperative  HEENT: NCAT, SHEBA, EOMI, Trachea ML, Neck supple  Cardiac: RRR S1, S2, no Murmurs, rubs or gallops  Respiratory: CTAB, normal respiratory effort, breath sounds equal BL, no wheeze, rhonchi or crackles  Abdomen: Soft, non-distended, non-tender, +bowel sounds  Vascular: Pulses 2+ throughout, extremities well perfused. DP pulses palpable bilaterally  Extremities: B/L lower extremity edema, wound to the left medial lower leg with fibrous exudate, venous stasis skin changes    LABS  --------------------------------------------------------------------------------------  Labs:  CAPILLARY BLOOD GLUCOSE                              12.0   5.01  )-----------( 452      ( 21 Apr 2019 20:00 )             38.1       Auto Neutrophil %: 77.0 % (04-21-19 @ 20:00)    04-21    142  |  103  |  17  ----------------------------<  126<H>  4.5   |  28  |  0.7      Calcium, Total Serum: 9.1 mg/dL (04-21-19 @ 20:00)      LFTs:             6.4  | 0.2  | 14       ------------------[107     ( 21 Apr 2019 20:00 )  3.9  | x    | 7              Lactate, Blood: 1.3 mmol/L (04-21-19 @ 20:00)    --------------------------------------------------------------------------------------  IMAGING RESULTS

## 2019-04-21 NOTE — ED ADULT NURSE NOTE - NSIMPLEMENTINTERV_GEN_ALL_ED
Implemented All Universal Safety Interventions:  [place] to call system. Call bell, personal items and telephone within reach. Instruct patient to call for assistance. Room bathroom lighting operational. Non-slip footwear when patient is off stretcher. Physically safe environment: no spills, clutter or unnecessary equipment. Stretcher in lowest position, wheels locked, appropriate side rails in place.

## 2019-04-21 NOTE — ED PROVIDER NOTE - PROGRESS NOTE DETAILS
No DVT per vascular tech.  Dr. Casanova of vascular surgery will look at arterial duplex and will examine patient. Dr. Casanova saw patient; Spoke to Dr. Crenshaw who states no urgent vascular intervention necessary.   Spoke to Jez from [facility]; will come examine patient. Dr. Casanova saw patient; Spoke to Dr. Crenshaw who states no urgent vascular intervention necessary.   Spoke to Dr. Jez Chadwick from [facility]; will come examine patient. Dr. Chadwick suggests admission with IV abx and possible debridement of wound.

## 2019-04-21 NOTE — ED PROVIDER NOTE - ATTENDING CONTRIBUTION TO CARE
LLE ulceration that was evaluated in past with vascular/ID/podiatry for cellulitis, had surgery for bypass for PVD. admitted in march. since then ulcer as increased in size and now includes down to left posterior calf tendon with erythematous borders surrounding ulcer. she does have palpable dp pulse there is healthy white tissue with clear discharge and an extensive ulcer measuring 8 by 4 cm wrapping around the posterior calf of left leg and starting medial malleolus.   IMP: given worsening ulcer now with tendon invovlemetn will consult vascular. consider cellulitis, check albs, cxr.

## 2019-04-21 NOTE — ED PROVIDER NOTE - PHYSICAL EXAMINATION
CONSTITUTIONAL: Well-developed; well-nourished; in no acute distress.   SKIN: warm, dry. ulcer on left lower extremity.   HEAD: Normocephalic; atraumatic.  EYES: PERRL, EOMI, no conjunctival erythema.  ENT: No nasal discharge; airway clear.  NECK: Supple; non tender.  CARD: S1, S2 normal; no murmurs, gallops, or rubs. Regular rate and rhythm.   RESP: No wheezes, rales or rhonchi.  ABD: soft ntnd.  EXT: left lower extremity has 20 cm x 10 cm ulcer on the medial aspect of left lower extremity extending posteriorly; the achilles tendon is visualized. No bone visualized. granulation tissue at the base; surrounding erythema without warmth; oozing straw colored fluid. DP pulse 1+ left.   NEURO: Alert, oriented, grossly unremarkable.  PSYCH: Cooperative, appropriate.

## 2019-04-21 NOTE — ED ADULT NURSE NOTE - OBJECTIVE STATEMENT
patient states her lower leg is painful on  and off. S/p vascular surgery . States wound to leg is chronic for past few years after injury from a fall. Wound to left leg wrappign to back of calf with surrounding macerated tissue and muscle exposure.

## 2019-04-22 LAB
ANION GAP SERPL CALC-SCNC: 11 MMOL/L — SIGNIFICANT CHANGE UP (ref 7–14)
APTT BLD: 34.2 SEC — SIGNIFICANT CHANGE UP (ref 27–39.2)
BASOPHILS # BLD AUTO: 0.02 K/UL — SIGNIFICANT CHANGE UP (ref 0–0.2)
BASOPHILS NFR BLD AUTO: 0.4 % — SIGNIFICANT CHANGE UP (ref 0–1)
BLD GP AB SCN SERPL QL: SIGNIFICANT CHANGE UP
BUN SERPL-MCNC: 11 MG/DL — SIGNIFICANT CHANGE UP (ref 10–20)
CALCIUM SERPL-MCNC: 8.6 MG/DL — SIGNIFICANT CHANGE UP (ref 8.5–10.1)
CHLORIDE SERPL-SCNC: 103 MMOL/L — SIGNIFICANT CHANGE UP (ref 98–110)
CO2 SERPL-SCNC: 25 MMOL/L — SIGNIFICANT CHANGE UP (ref 17–32)
CREAT SERPL-MCNC: 0.6 MG/DL — LOW (ref 0.7–1.5)
EOSINOPHIL # BLD AUTO: 0.3 K/UL — SIGNIFICANT CHANGE UP (ref 0–0.7)
EOSINOPHIL NFR BLD AUTO: 5.4 % — SIGNIFICANT CHANGE UP (ref 0–8)
GLUCOSE SERPL-MCNC: 126 MG/DL — HIGH (ref 70–99)
HCT VFR BLD CALC: 36.6 % — LOW (ref 37–47)
HGB BLD-MCNC: 11.8 G/DL — LOW (ref 12–16)
IMM GRANULOCYTES NFR BLD AUTO: 0.2 % — SIGNIFICANT CHANGE UP (ref 0.1–0.3)
INR BLD: 1.07 RATIO — SIGNIFICANT CHANGE UP (ref 0.65–1.3)
LYMPHOCYTES # BLD AUTO: 0.3 K/UL — LOW (ref 1.2–3.4)
LYMPHOCYTES # BLD AUTO: 5.4 % — LOW (ref 20.5–51.1)
MAGNESIUM SERPL-MCNC: 1.9 MG/DL — SIGNIFICANT CHANGE UP (ref 1.8–2.4)
MCHC RBC-ENTMCNC: 29.8 PG — SIGNIFICANT CHANGE UP (ref 27–31)
MCHC RBC-ENTMCNC: 32.2 G/DL — SIGNIFICANT CHANGE UP (ref 32–37)
MCV RBC AUTO: 92.4 FL — SIGNIFICANT CHANGE UP (ref 81–99)
MONOCYTES # BLD AUTO: 0.51 K/UL — SIGNIFICANT CHANGE UP (ref 0.1–0.6)
MONOCYTES NFR BLD AUTO: 9.3 % — SIGNIFICANT CHANGE UP (ref 1.7–9.3)
NEUTROPHILS # BLD AUTO: 4.37 K/UL — SIGNIFICANT CHANGE UP (ref 1.4–6.5)
NEUTROPHILS NFR BLD AUTO: 79.3 % — HIGH (ref 42.2–75.2)
NRBC # BLD: 0 /100 WBCS — SIGNIFICANT CHANGE UP (ref 0–0)
PHOSPHATE SERPL-MCNC: 3.9 MG/DL — SIGNIFICANT CHANGE UP (ref 2.1–4.9)
PLATELET # BLD AUTO: 295 K/UL — SIGNIFICANT CHANGE UP (ref 130–400)
POTASSIUM SERPL-MCNC: 3.9 MMOL/L — SIGNIFICANT CHANGE UP (ref 3.5–5)
POTASSIUM SERPL-SCNC: 3.9 MMOL/L — SIGNIFICANT CHANGE UP (ref 3.5–5)
PROTHROM AB SERPL-ACNC: 12.3 SEC — SIGNIFICANT CHANGE UP (ref 9.95–12.87)
RBC # BLD: 3.96 M/UL — LOW (ref 4.2–5.4)
RBC # FLD: 14 % — SIGNIFICANT CHANGE UP (ref 11.5–14.5)
SODIUM SERPL-SCNC: 139 MMOL/L — SIGNIFICANT CHANGE UP (ref 135–146)
TYPE + AB SCN PNL BLD: SIGNIFICANT CHANGE UP
WBC # BLD: 5.51 K/UL — SIGNIFICANT CHANGE UP (ref 4.8–10.8)
WBC # FLD AUTO: 5.51 K/UL — SIGNIFICANT CHANGE UP (ref 4.8–10.8)

## 2019-04-22 PROCEDURE — 93010 ELECTROCARDIOGRAM REPORT: CPT

## 2019-04-22 PROCEDURE — 71045 X-RAY EXAM CHEST 1 VIEW: CPT | Mod: 26

## 2019-04-22 RX ORDER — GABAPENTIN 400 MG/1
300 CAPSULE ORAL AT BEDTIME
Qty: 0 | Refills: 0 | Status: DISCONTINUED | OUTPATIENT
Start: 2019-04-22 | End: 2019-04-23

## 2019-04-22 RX ORDER — OXYCODONE AND ACETAMINOPHEN 5; 325 MG/1; MG/1
1 TABLET ORAL EVERY 6 HOURS
Qty: 0 | Refills: 0 | Status: DISCONTINUED | OUTPATIENT
Start: 2019-04-22 | End: 2019-04-23

## 2019-04-22 RX ORDER — ASPIRIN/CALCIUM CARB/MAGNESIUM 324 MG
81 TABLET ORAL DAILY
Qty: 0 | Refills: 0 | Status: DISCONTINUED | OUTPATIENT
Start: 2019-04-22 | End: 2019-04-23

## 2019-04-22 RX ORDER — MORPHINE SULFATE 50 MG/1
2 CAPSULE, EXTENDED RELEASE ORAL EVERY 6 HOURS
Qty: 0 | Refills: 0 | Status: DISCONTINUED | OUTPATIENT
Start: 2019-04-22 | End: 2019-04-23

## 2019-04-22 RX ORDER — ENOXAPARIN SODIUM 100 MG/ML
40 INJECTION SUBCUTANEOUS DAILY
Qty: 0 | Refills: 0 | Status: DISCONTINUED | OUTPATIENT
Start: 2019-04-22 | End: 2019-04-23

## 2019-04-22 RX ORDER — PIPERACILLIN AND TAZOBACTAM 4; .5 G/20ML; G/20ML
3.38 INJECTION, POWDER, LYOPHILIZED, FOR SOLUTION INTRAVENOUS EVERY 8 HOURS
Qty: 0 | Refills: 0 | Status: DISCONTINUED | OUTPATIENT
Start: 2019-04-22 | End: 2019-04-23

## 2019-04-22 RX ORDER — LOSARTAN POTASSIUM 100 MG/1
100 TABLET, FILM COATED ORAL DAILY
Qty: 0 | Refills: 0 | Status: DISCONTINUED | OUTPATIENT
Start: 2019-04-22 | End: 2019-04-23

## 2019-04-22 RX ORDER — PIPERACILLIN AND TAZOBACTAM 4; .5 G/20ML; G/20ML
4.5 INJECTION, POWDER, LYOPHILIZED, FOR SOLUTION INTRAVENOUS ONCE
Qty: 0 | Refills: 0 | Status: COMPLETED | OUTPATIENT
Start: 2019-04-22 | End: 2019-04-22

## 2019-04-22 RX ORDER — SODIUM CHLORIDE 9 MG/ML
1000 INJECTION INTRAMUSCULAR; INTRAVENOUS; SUBCUTANEOUS
Qty: 0 | Refills: 0 | Status: DISCONTINUED | OUTPATIENT
Start: 2019-04-22 | End: 2019-04-23

## 2019-04-22 RX ORDER — CHLORHEXIDINE GLUCONATE 213 G/1000ML
1 SOLUTION TOPICAL
Qty: 0 | Refills: 0 | Status: DISCONTINUED | OUTPATIENT
Start: 2019-04-22 | End: 2019-04-23

## 2019-04-22 RX ORDER — OLANZAPINE 15 MG/1
2.5 TABLET, FILM COATED ORAL
Qty: 0 | Refills: 0 | Status: DISCONTINUED | OUTPATIENT
Start: 2019-04-22 | End: 2019-04-23

## 2019-04-22 RX ORDER — COLLAGENASE CLOSTRIDIUM HIST. 250 UNIT/G
1 OINTMENT (GRAM) TOPICAL DAILY
Qty: 0 | Refills: 0 | Status: DISCONTINUED | OUTPATIENT
Start: 2019-04-22 | End: 2019-04-22

## 2019-04-22 RX ORDER — FENTANYL CITRATE 50 UG/ML
1 INJECTION INTRAVENOUS
Qty: 0 | Refills: 0 | Status: DISCONTINUED | OUTPATIENT
Start: 2019-04-22 | End: 2019-04-22

## 2019-04-22 RX ORDER — SIMVASTATIN 20 MG/1
20 TABLET, FILM COATED ORAL AT BEDTIME
Qty: 0 | Refills: 0 | Status: DISCONTINUED | OUTPATIENT
Start: 2019-04-22 | End: 2019-04-23

## 2019-04-22 RX ORDER — SODIUM CHLORIDE 9 MG/ML
1000 INJECTION INTRAMUSCULAR; INTRAVENOUS; SUBCUTANEOUS ONCE
Qty: 0 | Refills: 0 | Status: COMPLETED | OUTPATIENT
Start: 2019-04-22 | End: 2019-04-22

## 2019-04-22 RX ORDER — ACETAMINOPHEN 500 MG
650 TABLET ORAL EVERY 6 HOURS
Qty: 0 | Refills: 0 | Status: DISCONTINUED | OUTPATIENT
Start: 2019-04-22 | End: 2019-04-23

## 2019-04-22 RX ADMIN — OXYCODONE AND ACETAMINOPHEN 1 TABLET(S): 5; 325 TABLET ORAL at 17:06

## 2019-04-22 RX ADMIN — PIPERACILLIN AND TAZOBACTAM 200 GRAM(S): 4; .5 INJECTION, POWDER, LYOPHILIZED, FOR SOLUTION INTRAVENOUS at 01:28

## 2019-04-22 RX ADMIN — SODIUM CHLORIDE 1000 MILLILITER(S): 9 INJECTION INTRAMUSCULAR; INTRAVENOUS; SUBCUTANEOUS at 01:04

## 2019-04-22 RX ADMIN — LOSARTAN POTASSIUM 100 MILLIGRAM(S): 100 TABLET, FILM COATED ORAL at 05:23

## 2019-04-22 RX ADMIN — Medication 81 MILLIGRAM(S): at 11:09

## 2019-04-22 RX ADMIN — CHLORHEXIDINE GLUCONATE 1 APPLICATION(S): 213 SOLUTION TOPICAL at 05:25

## 2019-04-22 RX ADMIN — SODIUM CHLORIDE 100 MILLILITER(S): 9 INJECTION INTRAMUSCULAR; INTRAVENOUS; SUBCUTANEOUS at 17:06

## 2019-04-22 RX ADMIN — GABAPENTIN 300 MILLIGRAM(S): 400 CAPSULE ORAL at 21:42

## 2019-04-22 RX ADMIN — PIPERACILLIN AND TAZOBACTAM 25 GRAM(S): 4; .5 INJECTION, POWDER, LYOPHILIZED, FOR SOLUTION INTRAVENOUS at 21:40

## 2019-04-22 RX ADMIN — ENOXAPARIN SODIUM 40 MILLIGRAM(S): 100 INJECTION SUBCUTANEOUS at 11:09

## 2019-04-22 RX ADMIN — SIMVASTATIN 20 MILLIGRAM(S): 20 TABLET, FILM COATED ORAL at 21:42

## 2019-04-22 RX ADMIN — OLANZAPINE 2.5 MILLIGRAM(S): 15 TABLET, FILM COATED ORAL at 17:06

## 2019-04-22 RX ADMIN — SODIUM CHLORIDE 100 MILLILITER(S): 9 INJECTION INTRAMUSCULAR; INTRAVENOUS; SUBCUTANEOUS at 05:40

## 2019-04-22 RX ADMIN — OLANZAPINE 2.5 MILLIGRAM(S): 15 TABLET, FILM COATED ORAL at 05:24

## 2019-04-22 RX ADMIN — PIPERACILLIN AND TAZOBACTAM 25 GRAM(S): 4; .5 INJECTION, POWDER, LYOPHILIZED, FOR SOLUTION INTRAVENOUS at 13:10

## 2019-04-22 RX ADMIN — OXYCODONE AND ACETAMINOPHEN 1 TABLET(S): 5; 325 TABLET ORAL at 17:59

## 2019-04-22 RX ADMIN — PIPERACILLIN AND TAZOBACTAM 25 GRAM(S): 4; .5 INJECTION, POWDER, LYOPHILIZED, FOR SOLUTION INTRAVENOUS at 05:24

## 2019-04-22 NOTE — CONSULT NOTE ADULT - ASSESSMENT
85F with chronic LLE wound, recent SFA angioplasty, presenting with worsening of LLE with concern for infection, currently hemodynamically stable.     - Continue IV abx.   - LLE wrapped with xeroform, kerlix, ACE.   - Discussed with attending, Dr. Kurtz, to see in AM for possible operative intervention.     [facility] [id number] 85F with chronic LLE wound, recent SFA angioplasty, presenting with worsening of LLE with concern for infection, currently hemodynamically stable.     - Continue IV abx.   - LLE wrapped with xeroform, kerlix, ACE.   - Please ensure Nil Per Os except medications, continue IV Fluids for possible operative intervention.   - Discussed with attending, Dr. Kurtz, to see in AM.     [facility] [phone]

## 2019-04-22 NOTE — CONSULT NOTE ADULT - ASSESSMENT
infected lower leg wound / venous stasis ulcer    rec: soap and water qd, xeroform gauze, kerlex, ace wrap dressing change bid    will debride in OR under 1 hour general anesthesia in a  4/23

## 2019-04-22 NOTE — H&P ADULT - NSICDXPASTMEDICALHX_GEN_ALL_CORE_FT
PAST MEDICAL HISTORY:  HTN (hypertension)     Leg swelling     PVD (peripheral vascular disease)     Skin ulcer of left ankle, limited to breakdown of skin

## 2019-04-22 NOTE — PRE-ANESTHESIA EVALUATION ADULT - NSANTHOSAYNRD_GEN_A_CORE
No. STARR screening performed.  STOP BANG Legend: 0-2 = LOW Risk; 3-4 = INTERMEDIATE Risk; 5-8 = HIGH Risk

## 2019-04-22 NOTE — H&P ADULT - HISTORY OF PRESENT ILLNESS
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 Patient has had chronic wounds on the left ankle that she developed after a fall.  Since the fall the wounds never healed and the patient has chronic leg swelling. Patient reports that these wounds have drained significantly over the past few weeks.  She was admitted in march for infected wounds which were treated.  At the time arterial duplex showed stenosis of the SFA she underwent left SFA angioplasty on 3/12/19 with Dr. Crenshaw. During outpatient arterial duplex performed during postoperative visit 03/28 showed patent left femoropopliteal arteries without any restenosis.

## 2019-04-22 NOTE — H&P ADULT - ATTENDING COMMENTS
HPI as above.  Interval history: Pt seen and examined at bedside. Not in pain. No cp or sob.   Vital Signs (24 Hrs):  T(C): 36.4 (04-22-19 @ 12:04), Max: 36.4 (04-22-19 @ 12:04)  HR: 71 (04-22-19 @ 12:04) (64 - 73)  BP: 136/66 (04-22-19 @ 12:04) (136/66 - 172/78)  RR: 19 (04-22-19 @ 12:04) (18 - 19)  SpO2: 98% (04-22-19 @ 10:09) (97% - 99%)  Wt(kg): --  Daily Height in cm: 152.65 (22 Apr 2019 04:21)    Daily     I&O's Summary    22 Apr 2019 07:01  -  22 Apr 2019 14:41  --------------------------------------------------------  IN: 120 mL / OUT: 0 mL / NET: 120 mL      Exam: PHYSICAL EXAM:  GENERAL: NAD, well-developed  HEAD:  Atraumatic, Normocephalic  CHEST/LUNG: Clear to auscultation bilaterally; No wheeze  HEART: Regular rate and rhythm; No murmurs, rubs, or gallops  ABDOMEN: Soft, Nontender, Nondistended; Bowel sounds present  EXTREMITIES:  2+ Peripheral Pulses, No clubbing, cyanosis, or edema, lower ext wrapped  PSYCH: AAOx3  NEUROLOGY: non-focal      Labs reviewed  Imaging reviewed < from: Xray Chest 1 View- PORTABLE-Urgent (04.22.19 @ 06:20) >    No radiographic evidence of acute cardiopulmonary disease.    < end of copied text >    < from: Xray Foot AP + Lateral + Oblique, Left (04.21.19 @ 20:34) >    Generalized osteopenia with diffuse soft tissue swelling. No evidence for   osteomyelitis.    < end of copied text >    < from: Xray Tibia + Fibula 2 Views, Left (04.21.19 @ 20:34) >    Soft tissue ulceration posterior calf without definite evidence for   osteomyelitis.  Diffuse osteopenia.    < end of copied text >      EKG reviewed < from: 12 Lead ECG (04.22.19 @ 09:24) >    Diagnosis Line Sinus rhythm with 1st degree A-V block  Anterior infarct , age undetermined  Abnormal ECG    < end of copied text >    Plan  #LE cellulitis- continue abx, no OM om xray ESR elevated, ID consult, [facility] appreciated going to OR 4/23, npo past midnight, Burn resident to order preop work up, fu work up, PT after intervention     #Progress Note Handoff  Pending (specify):  Consults___ID, PT, Burn______, Tests________, Test Results_______, Other_________  Family discussion: kerrie pt and family at bedside and agreed with plan   Disposition: Home___/SNF___/Other________/Unknown at this time___x_____

## 2019-04-22 NOTE — CONSULT NOTE ADULT - SUBJECTIVE AND OBJECTIVE BOX
chronic left lower wound due to venous stasis ulcer    PE: left lower leg [id number] full thickness wound with adherent necrotic tissue

## 2019-04-22 NOTE — CONSULT NOTE ADULT - SUBJECTIVE AND OBJECTIVE BOX
[facility] Surgery Consult Note  [phone]     HPI: 85y Female with PMH significant for PVD, chronic wounds to the left lower extremity, HTN, presents to the ED with worsening ulceration to the LLE. Patient has had chronic wounds on the left ankle for years that she developed after a fall, never healed, and chronic leg swelling. She complains of significant drainage from the wound. During her last admission, she had been admitted for wound infection. During work up, she was found to have left SFA stenosis and underwent left SFA angioplasty on 3/12/19 with Dr. Crenshaw. Outpatient arterial duplex performed during postoperative visit 03/28 showed patent left femoropopliteal arteries without any restenosis.     Burn surgery consulted for evaluation of LLE and need for possible operative debridement. Patient states that pain has getting progressively worse over past month, however earlier in day  notes that she began having erythema, drainage from wound that was not present prior, thus decided to bring to ED for evaluation.     In ED, patient remains afebrile, hemodynamically stable.     PAST MEDICAL & SURGICAL HISTORY:  PVD (peripheral vascular disease)  Skin ulcer of left ankle, limited to breakdown of skin  Leg swelling  HTN (hypertension)      ALLERGIES:    HOME MEDICATIONS:    MEDICATIONS  (STANDING):  aspirin enteric coated 81 milliGRAM(s) Oral daily  collagenase Ointment 1 Application(s) Topical daily  gabapentin 300 milliGRAM(s) Oral at bedtime  losartan 100 milliGRAM(s) Oral daily  OLANZapine 2.5 milliGRAM(s) Oral two times a day  simvastatin 20 milliGRAM(s) Oral at bedtime      SOCIAL HISTORY:    FAMILY HISTORY:    ___________________________________________  REVIEW OF SYSTEMS:    ___________________________________________  PHYSICAL EXAM:  Vital Signs Last 24 Hrs  T(C): 36.1 (21 Apr 2019 23:39), Max: 36.1 (21 Apr 2019 23:39)  T(F): 97 (21 Apr 2019 23:39), Max: 97 (21 Apr 2019 23:39)  HR: 71 (21 Apr 2019 23:39) (71 - 73)  BP: 161/69 (21 Apr 2019 23:39) (145/73 - 161/69)  BP(mean): --  RR: 18 (21 Apr 2019 23:39) (18 - 18)  SpO2: 98% (21 Apr 2019 23:39) (98% - 99%)CAPILLARY BLOOD GLUCOSE        I&O's Detail      General: NAD, resting comfortably.   Cardiopulm: Non-labored breathing.   Ext: LLE with medial wound, clear yellow drainage expressable. Edges with erythema, very tender to palpation. Posterior aspect of wound with exposed tendon, mobile with passive extension/flexion of ankle.   RLE with some swelling.     ____________________________________________  LABS:  CBC Full  -  ( 21 Apr 2019 20:00 )  WBC Count : 5.01 K/uL  RBC Count : 4.00 M/uL  Hemoglobin : 12.0 g/dL  Hematocrit : 38.1 %  Platelet Count - Automated : 452 K/uL  Mean Cell Volume : 95.3 fL  Mean Cell Hemoglobin : 30.0 pg  Mean Cell Hemoglobin Concentration : 31.5 g/dL  Auto Neutrophil # : 3.86 K/uL  Auto Lymphocyte # : 0.31 K/uL  Auto Monocyte # : 0.40 K/uL  Auto Eosinophil # : 0.36 K/uL  Auto Basophil # : 0.05 K/uL  Auto Neutrophil % : 77.0 %  Auto Lymphocyte % : 6.2 %  Auto Monocyte % : 7.9 %  Auto Eosinophil % : 7.1 %  Auto Basophil % : 0.9 %    04-21    142  |  103  |  17  ----------------------------<  126<H>  4.5   |  28  |  0.7    Ca    9.1      21 Apr 2019 20:00    TPro  6.4  /  Alb  3.9  /  TBili  0.2  /  DBili  x   /  AST  14  /  ALT  7   /  AlkPhos  107  04-21    LIVER FUNCTIONS - ( 21 Apr 2019 20:00 )  Alb: 3.9 g/dL / Pro: 6.4 g/dL / ALK PHOS: 107 U/L / ALT: 7 U/L / AST: 14 U/L / GGT: x

## 2019-04-22 NOTE — H&P ADULT - ASSESSMENT
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    # Infected LLE wound w/ swelling, w/ recent angioplasty (), and w/ likely superimposed cellulitis   - Vascular surgery following, no intervention at this time  - c/w  wound care  - c/w zosyn @ 3.375 gm q8h  - consider ID consult  - monitor vitals  - morphine for pain prn    # HTN  - losartan 100mg daily  - monitor vitals    # PVD  - follows up with  outpatient    # DLD  - c/w simvastatin daily    Activity: ambulate as tolerated  diet: dash  dvt ppx: lovenox 40mg daily  gi ppx: not indicated  full code  dispo: pending

## 2019-04-23 ENCOUNTER — RESULT REVIEW (OUTPATIENT)
Age: 84
End: 2019-04-23

## 2019-04-23 LAB
ALBUMIN SERPL ELPH-MCNC: 3.4 G/DL — LOW (ref 3.5–5.2)
ALP SERPL-CCNC: 99 U/L — SIGNIFICANT CHANGE UP (ref 30–115)
ALT FLD-CCNC: 6 U/L — SIGNIFICANT CHANGE UP (ref 0–41)
ANION GAP SERPL CALC-SCNC: 11 MMOL/L — SIGNIFICANT CHANGE UP (ref 7–14)
APTT BLD: 31.9 SEC — SIGNIFICANT CHANGE UP (ref 27–39.2)
AST SERPL-CCNC: 13 U/L — SIGNIFICANT CHANGE UP (ref 0–41)
BASOPHILS # BLD AUTO: 0.02 K/UL — SIGNIFICANT CHANGE UP (ref 0–0.2)
BASOPHILS NFR BLD AUTO: 0.4 % — SIGNIFICANT CHANGE UP (ref 0–1)
BILIRUB SERPL-MCNC: 0.4 MG/DL — SIGNIFICANT CHANGE UP (ref 0.2–1.2)
BLD GP AB SCN SERPL QL: SIGNIFICANT CHANGE UP
BUN SERPL-MCNC: 9 MG/DL — LOW (ref 10–20)
CALCIUM SERPL-MCNC: 8.6 MG/DL — SIGNIFICANT CHANGE UP (ref 8.5–10.1)
CHLORIDE SERPL-SCNC: 107 MMOL/L — SIGNIFICANT CHANGE UP (ref 98–110)
CO2 SERPL-SCNC: 24 MMOL/L — SIGNIFICANT CHANGE UP (ref 17–32)
CREAT SERPL-MCNC: 0.6 MG/DL — LOW (ref 0.7–1.5)
EOSINOPHIL # BLD AUTO: 0.31 K/UL — SIGNIFICANT CHANGE UP (ref 0–0.7)
EOSINOPHIL NFR BLD AUTO: 6.1 % — SIGNIFICANT CHANGE UP (ref 0–8)
GLUCOSE SERPL-MCNC: 105 MG/DL — HIGH (ref 70–99)
HCT VFR BLD CALC: 37.5 % — SIGNIFICANT CHANGE UP (ref 37–47)
HGB BLD-MCNC: 11.9 G/DL — LOW (ref 12–16)
IMM GRANULOCYTES NFR BLD AUTO: 0.6 % — HIGH (ref 0.1–0.3)
INR BLD: 1.04 RATIO — SIGNIFICANT CHANGE UP (ref 0.65–1.3)
LYMPHOCYTES # BLD AUTO: 0.29 K/UL — LOW (ref 1.2–3.4)
LYMPHOCYTES # BLD AUTO: 5.7 % — LOW (ref 20.5–51.1)
MAGNESIUM SERPL-MCNC: 2 MG/DL — SIGNIFICANT CHANGE UP (ref 1.8–2.4)
MCHC RBC-ENTMCNC: 30 PG — SIGNIFICANT CHANGE UP (ref 27–31)
MCHC RBC-ENTMCNC: 31.7 G/DL — LOW (ref 32–37)
MCV RBC AUTO: 94.5 FL — SIGNIFICANT CHANGE UP (ref 81–99)
MONOCYTES # BLD AUTO: 0.44 K/UL — SIGNIFICANT CHANGE UP (ref 0.1–0.6)
MONOCYTES NFR BLD AUTO: 8.7 % — SIGNIFICANT CHANGE UP (ref 1.7–9.3)
NEUTROPHILS # BLD AUTO: 3.96 K/UL — SIGNIFICANT CHANGE UP (ref 1.4–6.5)
NEUTROPHILS NFR BLD AUTO: 78.5 % — HIGH (ref 42.2–75.2)
NRBC # BLD: 0 /100 WBCS — SIGNIFICANT CHANGE UP (ref 0–0)
PLATELET # BLD AUTO: 249 K/UL — SIGNIFICANT CHANGE UP (ref 130–400)
POTASSIUM SERPL-MCNC: 4 MMOL/L — SIGNIFICANT CHANGE UP (ref 3.5–5)
POTASSIUM SERPL-SCNC: 4 MMOL/L — SIGNIFICANT CHANGE UP (ref 3.5–5)
PROT SERPL-MCNC: 6 G/DL — SIGNIFICANT CHANGE UP (ref 6–8)
PROTHROM AB SERPL-ACNC: 12 SEC — SIGNIFICANT CHANGE UP (ref 9.95–12.87)
RBC # BLD: 3.97 M/UL — LOW (ref 4.2–5.4)
RBC # FLD: 14.1 % — SIGNIFICANT CHANGE UP (ref 11.5–14.5)
SODIUM SERPL-SCNC: 142 MMOL/L — SIGNIFICANT CHANGE UP (ref 135–146)
TYPE + AB SCN PNL BLD: SIGNIFICANT CHANGE UP
WBC # BLD: 5.05 K/UL — SIGNIFICANT CHANGE UP (ref 4.8–10.8)
WBC # FLD AUTO: 5.05 K/UL — SIGNIFICANT CHANGE UP (ref 4.8–10.8)

## 2019-04-23 PROCEDURE — 99222 1ST HOSP IP/OBS MODERATE 55: CPT

## 2019-04-23 PROCEDURE — 88304 TISSUE EXAM BY PATHOLOGIST: CPT | Mod: 26

## 2019-04-23 RX ORDER — ENOXAPARIN SODIUM 100 MG/ML
40 INJECTION SUBCUTANEOUS DAILY
Qty: 0 | Refills: 0 | Status: DISCONTINUED | OUTPATIENT
Start: 2019-04-23 | End: 2019-04-29

## 2019-04-23 RX ORDER — OXYCODONE AND ACETAMINOPHEN 5; 325 MG/1; MG/1
1 TABLET ORAL EVERY 6 HOURS
Qty: 0 | Refills: 0 | Status: DISCONTINUED | OUTPATIENT
Start: 2019-04-23 | End: 2019-04-29

## 2019-04-23 RX ORDER — ONDANSETRON 8 MG/1
4 TABLET, FILM COATED ORAL ONCE
Qty: 0 | Refills: 0 | Status: DISCONTINUED | OUTPATIENT
Start: 2019-04-23 | End: 2019-04-23

## 2019-04-23 RX ORDER — LOSARTAN POTASSIUM 100 MG/1
100 TABLET, FILM COATED ORAL DAILY
Qty: 0 | Refills: 0 | Status: DISCONTINUED | OUTPATIENT
Start: 2019-04-23 | End: 2019-04-29

## 2019-04-23 RX ORDER — SODIUM CHLORIDE 9 MG/ML
1000 INJECTION, SOLUTION INTRAVENOUS
Qty: 0 | Refills: 0 | Status: DISCONTINUED | OUTPATIENT
Start: 2019-04-23 | End: 2019-04-23

## 2019-04-23 RX ORDER — ASPIRIN/CALCIUM CARB/MAGNESIUM 324 MG
81 TABLET ORAL DAILY
Qty: 0 | Refills: 0 | Status: DISCONTINUED | OUTPATIENT
Start: 2019-04-23 | End: 2019-04-29

## 2019-04-23 RX ORDER — SODIUM CHLORIDE 9 MG/ML
1000 INJECTION INTRAMUSCULAR; INTRAVENOUS; SUBCUTANEOUS
Qty: 0 | Refills: 0 | Status: DISCONTINUED | OUTPATIENT
Start: 2019-04-23 | End: 2019-04-25

## 2019-04-23 RX ORDER — PIPERACILLIN AND TAZOBACTAM 4; .5 G/20ML; G/20ML
3.38 INJECTION, POWDER, LYOPHILIZED, FOR SOLUTION INTRAVENOUS EVERY 8 HOURS
Qty: 0 | Refills: 0 | Status: DISCONTINUED | OUTPATIENT
Start: 2019-04-23 | End: 2019-04-29

## 2019-04-23 RX ORDER — OLANZAPINE 15 MG/1
2.5 TABLET, FILM COATED ORAL
Qty: 0 | Refills: 0 | Status: DISCONTINUED | OUTPATIENT
Start: 2019-04-23 | End: 2019-04-29

## 2019-04-23 RX ORDER — ACETAMINOPHEN 500 MG
650 TABLET ORAL EVERY 6 HOURS
Qty: 0 | Refills: 0 | Status: DISCONTINUED | OUTPATIENT
Start: 2019-04-23 | End: 2019-04-29

## 2019-04-23 RX ORDER — GABAPENTIN 400 MG/1
300 CAPSULE ORAL AT BEDTIME
Qty: 0 | Refills: 0 | Status: DISCONTINUED | OUTPATIENT
Start: 2019-04-23 | End: 2019-04-29

## 2019-04-23 RX ORDER — MORPHINE SULFATE 50 MG/1
2 CAPSULE, EXTENDED RELEASE ORAL EVERY 6 HOURS
Qty: 0 | Refills: 0 | Status: DISCONTINUED | OUTPATIENT
Start: 2019-04-23 | End: 2019-04-25

## 2019-04-23 RX ORDER — MORPHINE SULFATE 50 MG/1
2 CAPSULE, EXTENDED RELEASE ORAL
Qty: 0 | Refills: 0 | Status: DISCONTINUED | OUTPATIENT
Start: 2019-04-23 | End: 2019-04-23

## 2019-04-23 RX ORDER — SIMVASTATIN 20 MG/1
20 TABLET, FILM COATED ORAL AT BEDTIME
Qty: 0 | Refills: 0 | Status: DISCONTINUED | OUTPATIENT
Start: 2019-04-23 | End: 2019-04-29

## 2019-04-23 RX ORDER — CHLORHEXIDINE GLUCONATE 213 G/1000ML
1 SOLUTION TOPICAL
Qty: 0 | Refills: 0 | Status: DISCONTINUED | OUTPATIENT
Start: 2019-04-23 | End: 2019-04-29

## 2019-04-23 RX ADMIN — ENOXAPARIN SODIUM 40 MILLIGRAM(S): 100 INJECTION SUBCUTANEOUS at 11:33

## 2019-04-23 RX ADMIN — SIMVASTATIN 20 MILLIGRAM(S): 20 TABLET, FILM COATED ORAL at 21:03

## 2019-04-23 RX ADMIN — PIPERACILLIN AND TAZOBACTAM 25 GRAM(S): 4; .5 INJECTION, POWDER, LYOPHILIZED, FOR SOLUTION INTRAVENOUS at 14:55

## 2019-04-23 RX ADMIN — PIPERACILLIN AND TAZOBACTAM 25 GRAM(S): 4; .5 INJECTION, POWDER, LYOPHILIZED, FOR SOLUTION INTRAVENOUS at 21:03

## 2019-04-23 RX ADMIN — CHLORHEXIDINE GLUCONATE 1 APPLICATION(S): 213 SOLUTION TOPICAL at 05:10

## 2019-04-23 RX ADMIN — Medication 81 MILLIGRAM(S): at 11:33

## 2019-04-23 RX ADMIN — GABAPENTIN 300 MILLIGRAM(S): 400 CAPSULE ORAL at 21:03

## 2019-04-23 RX ADMIN — LOSARTAN POTASSIUM 100 MILLIGRAM(S): 100 TABLET, FILM COATED ORAL at 05:10

## 2019-04-23 RX ADMIN — PIPERACILLIN AND TAZOBACTAM 25 GRAM(S): 4; .5 INJECTION, POWDER, LYOPHILIZED, FOR SOLUTION INTRAVENOUS at 05:07

## 2019-04-23 RX ADMIN — OLANZAPINE 2.5 MILLIGRAM(S): 15 TABLET, FILM COATED ORAL at 17:29

## 2019-04-23 RX ADMIN — SODIUM CHLORIDE 100 MILLILITER(S): 9 INJECTION INTRAMUSCULAR; INTRAVENOUS; SUBCUTANEOUS at 01:51

## 2019-04-23 RX ADMIN — OLANZAPINE 2.5 MILLIGRAM(S): 15 TABLET, FILM COATED ORAL at 05:10

## 2019-04-23 NOTE — BRIEF OPERATIVE NOTE - OPERATION/FINDINGS
venous stasis ulcer with adherent necrotic tissue left lower leg--> excision to and including fascia

## 2019-04-23 NOTE — PROGRESS NOTE ADULT - ATTENDING COMMENTS
Patient was evaluated and examined by bedside, s/p right leg wound debridement, with wound vac application, no fever  All labs, radiology studies, VS was reviewed  I agree with medical plan outlined by Medical resident as stated above.  -acute right lower extremity venous stasis wound infection, cxr - no om, s/p sx. debridement today, f/up cxs, continue IV abx.  -pain management    h/o HTN -resumed on home regimen tx.    h/o PVD- outpatient vasc. sx. f/up     h/o dyslipidemia - daily statins tx.    #Progress Note Handoff: Pending Consults_________,Tests________,Test Results___wound cxs, wound care specialist f/up ____,Other;  Family discussion: pt. by bedside Disposition: Home__to be determined Patient was evaluated and examined by bedside, s/p left leg wound debridement, with wound vac application, no fever  All labs, radiology studies, VS was reviewed  I agree with medical plan outlined by Medical resident as stated above.  -acute left lower extremity venous stasis wound infection, cxr - no om, s/p sx. debridement today, f/up cxs, continue IV abx.  -pain management    h/o HTN -resumed on home regimen tx.    h/o PVD- outpatient vasc. sx. f/up     h/o dyslipidemia - daily statins tx.    #Progress Note Handoff: Pending Consults_________,Tests________,Test Results___wound cxs, wound care specialist f/up ____,Other;  Family discussion: pt. by bedside Disposition: Home__to be determined

## 2019-04-23 NOTE — CONSULT NOTE ADULT - SUBJECTIVE AND OBJECTIVE BOX
KALI ANDERS  85y, Female  Allergy: vancomycin (Flushing)      HPI:  HPI: 85y Female with PMH significant for PVD, chronic wounds to the left lower extremity, HTN, presents to the ED with worsening ulceration to the LLE. Patient has had chronic wounds on the left ankle for years that she developed after a fall, never healed, and chronic leg swelling. She complains of significant drainage from the wound. During her last admission, she had been admitted for wound infection. During work up, she was found to have left SFA stenosis and underwent left SFA angioplasty on 3/12/19 with Dr. Crenshaw. Outpatient arterial duplex performed during postoperative visit 03/28 showed patent left femoropopliteal arteries without any restenosis.     Burn surgery consulted for evaluation of chronic left lower wound due to venous stasis ulcer --> Pt has full thickness wound with necrotic tissue, taken to OR today for surgical debriedment      FAMILY HISTORY:    PAST MEDICAL & SURGICAL HISTORY:  PVD (peripheral vascular disease)  Skin ulcer of left ankle, limited to breakdown of skin  Leg swelling  HTN (hypertension)      ROS negative except as per HPI    VITALS:  T(F): 96.5, Max: 97.6 (04-22-19 @ 12:04)  HR: 68  BP: 164/81  RR: 18Vital Signs Last 24 Hrs  T(C): 35.8 (23 Apr 2019 06:09), Max: 36.4 (22 Apr 2019 12:04)  T(F): 96.5 (23 Apr 2019 06:09), Max: 97.6 (22 Apr 2019 12:04)  HR: 68 (23 Apr 2019 06:09) (66 - 71)  BP: 164/81 (23 Apr 2019 06:09) (136/66 - 164/81)  BP(mean): --  RR: 18 (23 Apr 2019 06:09) (18 - 19)  SpO2: 100% (23 Apr 2019 07:45) (97% - 100%)    PHYSICAL EXAM:  ***    TESTS & MEASUREMENTS:                        11.9   5.05  )-----------( 249      ( 23 Apr 2019 07:22 )             37.5     04-23    142  |  107  |  9<L>  ----------------------------<  105<H>  4.0   |  24  |  0.6<L>    Ca    8.6      23 Apr 2019 07:22  Phos  3.9     04-22  Mg     2.0     04-23    TPro  6.0  /  Alb  3.4<L>  /  TBili  0.4  /  DBili  x   /  AST  13  /  ALT  6   /  AlkPhos  99  04-23    LIVER FUNCTIONS - ( 23 Apr 2019 07:22 )  Alb: 3.4 g/dL / Pro: 6.0 g/dL / ALK PHOS: 99 U/L / ALT: 6 U/L / AST: 13 U/L / GGT: x                   RADIOLOGY & ADDITIONAL TESTS:    < from: [facility] Duplex Low Ext Arterial, Ltd, Left (04.21.19 @ 21:19) >    Impression:    On the left: Elevated SFA, AT, and PT velocities: Moderate femoral and   tibial disease.     Possibly slightly improved compared to previous duplex of 3/5/2019.    < end of copied text >      < from: Xray Foot AP + Lateral + Oblique, Left (04.21.19 @ 20:34) >  Impression:    Generalized osteopenia with diffuse soft tissue swelling. No evidence for   osteomyelitis.    < end of copied text >    < from: Xray Foot AP + Lateral + Oblique, Left (03.05.19 @ 15:14) >  Findings:  There is no evidence of acute fracture or dislocation. Diffuse   osteopenia. Scattered degenerative changes. Mild soft tissue swelling of   the foot, without any radiopaque foreign body.    Impression: No acute fracture or dislocation.    < end of copied text >        ANTIBIOTICS:  piperacillin/tazobactam IVPB.   200 mL/Hr IV Intermittent (04-22-19 @ 01:28)    piperacillin/tazobactam IVPB.   25 mL/Hr IV Intermittent (04-23-19 @ 05:07)   25 mL/Hr IV Intermittent (04-22-19 @ 21:40)   25 mL/Hr IV Intermittent (04-22-19 @ 13:10)   25 mL/Hr IV Intermittent (04-22-19 @ 05:24) KALI ANDERS  85y, Female  Allergy: vancomycin (Flushing)      HPI:  HPI: 85y Female with PMH significant for PVD, chronic wounds to the left lower extremity, HTN, presents to the ED with worsening ulceration to the LLE. Patient has had chronic wounds on the left ankle for years that she developed after a fall, never healed, and chronic leg swelling. She complains of significant drainage from the wound. During her last admission, she had been admitted for wound infection. During work up, she was found to have left SFA stenosis and underwent left SFA angioplasty on 3/12/19 with Dr. Crenshaw. Outpatient arterial duplex performed during postoperative visit 03/28 showed patent left femoropopliteal arteries without any restenosis.     Burn surgery consulted for evaluation of chronic left lower wound due to venous stasis ulcer --> Pt has full thickness wound with necrotic tissue, taken to OR today for surgical debriedment      FAMILY HISTORY:    PAST MEDICAL & SURGICAL HISTORY:  PVD (peripheral vascular disease)  Skin ulcer of left ankle, limited to breakdown of skin  Leg swelling  HTN (hypertension)      ROS negative except as per HPI    VITALS:  T(F): 96.5, Max: 97.6 (04-22-19 @ 12:04)  HR: 68  BP: 164/81  RR: 18Vital Signs Last 24 Hrs  T(C): 35.8 (23 Apr 2019 06:09), Max: 36.4 (22 Apr 2019 12:04)  T(F): 96.5 (23 Apr 2019 06:09), Max: 97.6 (22 Apr 2019 12:04)  HR: 68 (23 Apr 2019 06:09) (66 - 71)  BP: 164/81 (23 Apr 2019 06:09) (136/66 - 164/81)  BP(mean): --  RR: 18 (23 Apr 2019 06:09) (18 - 19)  SpO2: 100% (23 Apr 2019 07:45) (97% - 100%)    PHYSICAL EXAM:  Gen: Awake and alert, non-toxic appearing, NAD  HEENT: NCAT.   CV: RRR  Lungs: CTAB  Abd: Soft. NTND  Extr: wwp, dressings b/l wound vac  Skin: no rash  Neuro: No focal deficits  Lines: clean      TESTS & MEASUREMENTS:                        11.9   5.05  )-----------( 249      ( 23 Apr 2019 07:22 )             37.5     04-23    142  |  107  |  9<L>  ----------------------------<  105<H>  4.0   |  24  |  0.6<L>    Ca    8.6      23 Apr 2019 07:22  Phos  3.9     04-22  Mg     2.0     04-23    TPro  6.0  /  Alb  3.4<L>  /  TBili  0.4  /  DBili  x   /  AST  13  /  ALT  6   /  AlkPhos  99  04-23    LIVER FUNCTIONS - ( 23 Apr 2019 07:22 )  Alb: 3.4 g/dL / Pro: 6.0 g/dL / ALK PHOS: 99 U/L / ALT: 6 U/L / AST: 13 U/L / GGT: x                   RADIOLOGY & ADDITIONAL TESTS:    < from: [facility] Duplex Low Ext Arterial, Ltd, Left (04.21.19 @ 21:19) >    Impression:    On the left: Elevated SFA, AT, and PT velocities: Moderate femoral and   tibial disease.     Possibly slightly improved compared to previous duplex of 3/5/2019.    < end of copied text >      < from: Xray Foot AP + Lateral + Oblique, Left (04.21.19 @ 20:34) >  Impression:    Generalized osteopenia with diffuse soft tissue swelling. No evidence for   osteomyelitis.    < end of copied text >    < from: Xray Foot AP + Lateral + Oblique, Left (03.05.19 @ 15:14) >  Findings:  There is no evidence of acute fracture or dislocation. Diffuse   osteopenia. Scattered degenerative changes. Mild soft tissue swelling of   the foot, without any radiopaque foreign body.    Impression: No acute fracture or dislocation.    < end of copied text >        ANTIBIOTICS:  piperacillin/tazobactam IVPB.   200 mL/Hr IV Intermittent (04-22-19 @ 01:28)    piperacillin/tazobactam IVPB.   25 mL/Hr IV Intermittent (04-23-19 @ 05:07)   25 mL/Hr IV Intermittent (04-22-19 @ 21:40)   25 mL/Hr IV Intermittent (04-22-19 @ 13:10)   25 mL/Hr IV Intermittent (04-22-19 @ 05:24)

## 2019-04-23 NOTE — BRIEF OPERATIVE NOTE - NSICDXBRIEFPOSTOP_GEN_ALL_CORE_FT
POST-OP DIAGNOSIS:  Venous stasis ulcer of lower extremity 23-Apr-2019 10:27:32 left lower extremity David Kurtz

## 2019-04-23 NOTE — PROGRESS NOTE ADULT - SUBJECTIVE AND OBJECTIVE BOX
SUBJECTIVE:    Patient is a 85y old Female who presents with a chief complaint of   Currently admitted to medicine with the primary diagnosis of Skin ulcer of left calf, with unspecified severity     Today is hospital day 1d. This morning she is resting comfortably in bed and reports no new issues or overnight events; transport at bedside to take patient to OR.      PAST MEDICAL & SURGICAL HISTORY  PVD (peripheral vascular disease)  Skin ulcer of left ankle, limited to breakdown of skin  Leg swelling  HTN (hypertension)    SOCIAL HISTORY:  Negative for smoking/alcohol/drug use.     ALLERGIES:  vancomycin (Flushing)    MEDICATIONS:  STANDING MEDICATIONS  aspirin enteric coated 81 milliGRAM(s) Oral daily  chlorhexidine 4% Liquid 1 Application(s) Topical <User Schedule>  enoxaparin Injectable 40 milliGRAM(s) SubCutaneous daily  gabapentin 300 milliGRAM(s) Oral at bedtime  lactated ringers. 1000 milliLiter(s) IV Continuous <Continuous>  losartan 100 milliGRAM(s) Oral daily  OLANZapine 2.5 milliGRAM(s) Oral two times a day  piperacillin/tazobactam IVPB. 3.375 Gram(s) IV Intermittent every 8 hours  simvastatin 20 milliGRAM(s) Oral at bedtime  sodium chloride 0.9%. 1000 milliLiter(s) IV Continuous <Continuous>    PRN MEDICATIONS  acetaminophen   Tablet .. 650 milliGRAM(s) Oral every 6 hours PRN  morphine  - Injectable 2 milliGRAM(s) IV Push every 6 hours PRN  morphine  - Injectable 2 milliGRAM(s) IV Push every 10 minutes PRN  ondansetron Injectable 4 milliGRAM(s) IV Push once PRN  oxyCODONE    5 mG/acetaminophen 325 mG 1 Tablet(s) Oral every 6 hours PRN    VITALS:   T(F): 96.1  HR: 69  BP: 145/64  RR: 17  SpO2: 96%    LABS:                        11.9   5.05  )-----------( 249      ( 23 Apr 2019 07:22 )             37.5     04-23    142  |  107  |  9<L>  ----------------------------<  105<H>  4.0   |  24  |  0.6<L>    Ca    8.6      23 Apr 2019 07:22  Phos  3.9     04-22  Mg     2.0     04-23    TPro  6.0  /  Alb  3.4<L>  /  TBili  0.4  /  DBili  x   /  AST  13  /  ALT  6   /  AlkPhos  99  04-23    PT/INR - ( 23 Apr 2019 07:22 )   PT: 12.00 sec;   INR: 1.04 ratio         PTT - ( 23 Apr 2019 07:22 )  PTT:31.9 sec    RADIOLOGY:    < from: [facility] Duplex Low Ext Arterial, Ltd, Left (04.21.19 @ 21:19) >  Impression:    On the left: Elevated SFA, AT, and PT velocities: Moderate femoral and   tibial disease.     Possibly slightly improved compared to previous duplex of 3/5/2019.    < end of copied text >          PHYSICAL EXAM:  GEN: No acute distress  LUNGS: Clear to auscultation bilaterally   HEART: S1/S2 present. RRR.   ABD: Soft, non-tender, non-distended. Bowel sounds present  EXT: LLE wrapped in bandage, dry and intact/ NC/NC/NE/2+PP/WU/Skin Intact.   NEURO: AAOX3    Intravenous access:   NG tube:   Saha cathter:

## 2019-04-23 NOTE — CONSULT NOTE ADULT - ASSESSMENT
84 y/o with significant PMH of PVD presenting for Necrotic LLE wound in the setting of chronic venous stasis ulcers taken to OR for surgical debriedment today    -Pt afebrile, no leukocytosis. Blood Cx: NGTD  - F/u surgical cultures  - Will make final decision on antibiotic duration after clarifying margins with Burn post OR  - Continue with Zosyn 3.375g q8hr. Add vancomycin [id number].  - Attending note to follow 86 y/o with significant PMH of PVD presenting for Necrotic LLE wound in the setting of chronic venous stasis ulcers taken to OR for surgical debriedment today    -Pt afebrile, no leukocytosis. Blood Cx: NGTD  - Will make final decision on antibiotic regimen and duration after clarifying margins and surgical cultures are back  - Continue with Zosyn 3.375g q8hr at this time  - Attending note to follow 86 y/o with significant PMH of PVD presenting for Necrotic LLE wound in the setting of chronic venous stasis ulcers taken to OR for surgical debriedment today  Xray no OM    -Pt afebrile, no leukocytosis. Blood Cx: NGTD  - f/u OR cultures  - Continue with Zosyn 3.375g q8hr at this time  - ESR CRP

## 2019-04-23 NOTE — BRIEF OPERATIVE NOTE - NSICDXBRIEFPREOP_GEN_ALL_CORE_FT
PRE-OP DIAGNOSIS:  Venous stasis ulcer of lower extremity 23-Apr-2019 10:26:58 left lower leg David Kurtz

## 2019-04-23 NOTE — PROGRESS NOTE ADULT - ASSESSMENT
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    # Venous stasis ulcer with adherent necrotic tissue left lower leg--> excision to and including fascia  - s/p Debridement of wound, excision to and including fascia  - f/u wound culture of debrided tissues   - burn recommends wound vac on d/c   - c/w  wound care  - c/w zosyn @ 3.375 gm q8h  - f/u ID consult  - monitor vitals  - morphine for pain prn    # HTN  - losartan 100mg daily  - monitor vitals    # PVD  - follows up with  outpatient    # DLD  - c/w simvastatin daily    Activity: ambulate as tolerated  diet: dash  dvt ppx: lovenox 40mg daily  gi ppx: not indicated  full code  dispo: pending 84 y/o female with pmh of PVD, chronic wounds to the left lower extremity, HTN, presents to the ED with worsening ulceration of the LLE.    # Venous stasis ulcer with adherent necrotic tissue left lower leg--> excision to and including fascia  - s/p Debridement of wound, excision to and including fascia  - f/u wound culture of debrided tissues   - burn recommends wound vac on d/c   - c/w  wound care  - c/w zosyn @ 3.375 gm q8h  - f/u ID consult  - monitor vitals  - morphine for pain prn  - f/u PT/Physiatry     # HTN  - losartan 100mg daily  - monitor vitals    # PVD  - follows up with  outpatient    # DLD  - c/w simvastatin daily    Activity: ambulate as tolerated  diet: dash  dvt ppx: lovenox 40mg daily  gi ppx: not indicated  full code  dispo: pending

## 2019-04-23 NOTE — BRIEF OPERATIVE NOTE - NSICDXBRIEFPROCEDURE_GEN_ALL_CORE_FT
PROCEDURES:  Debridement of wound 23-Apr-2019 10:26:26 excisional debridement left lower leg David Kurtz

## 2019-04-23 NOTE — CONSULT NOTE ADULT - ATTENDING COMMENTS
85 year old s/p angioplasty 3/2019.     Pt has chronic ulceration of the leg. there is no indication for intervention at this time. Continue local wound care. F/u as outpatient.
I have personally examined the patient and reviewed the documentation above.  Corrections and edits were made wherever needed.

## 2019-04-24 LAB
ALBUMIN SERPL ELPH-MCNC: 3.3 G/DL — LOW (ref 3.5–5.2)
ALP SERPL-CCNC: 89 U/L — SIGNIFICANT CHANGE UP (ref 30–115)
ALT FLD-CCNC: 5 U/L — SIGNIFICANT CHANGE UP (ref 0–41)
ANION GAP SERPL CALC-SCNC: 11 MMOL/L — SIGNIFICANT CHANGE UP (ref 7–14)
AST SERPL-CCNC: 11 U/L — SIGNIFICANT CHANGE UP (ref 0–41)
BASOPHILS # BLD AUTO: 0.02 K/UL — SIGNIFICANT CHANGE UP (ref 0–0.2)
BASOPHILS NFR BLD AUTO: 0.5 % — SIGNIFICANT CHANGE UP (ref 0–1)
BILIRUB SERPL-MCNC: 0.3 MG/DL — SIGNIFICANT CHANGE UP (ref 0.2–1.2)
BUN SERPL-MCNC: 14 MG/DL — SIGNIFICANT CHANGE UP (ref 10–20)
CALCIUM SERPL-MCNC: 8.4 MG/DL — LOW (ref 8.5–10.1)
CHLORIDE SERPL-SCNC: 107 MMOL/L — SIGNIFICANT CHANGE UP (ref 98–110)
CO2 SERPL-SCNC: 24 MMOL/L — SIGNIFICANT CHANGE UP (ref 17–32)
CREAT SERPL-MCNC: 0.7 MG/DL — SIGNIFICANT CHANGE UP (ref 0.7–1.5)
EOSINOPHIL # BLD AUTO: 0.08 K/UL — SIGNIFICANT CHANGE UP (ref 0–0.7)
EOSINOPHIL NFR BLD AUTO: 2 % — SIGNIFICANT CHANGE UP (ref 0–8)
GLUCOSE SERPL-MCNC: 124 MG/DL — HIGH (ref 70–99)
HCT VFR BLD CALC: 32.9 % — LOW (ref 37–47)
HGB BLD-MCNC: 10.5 G/DL — LOW (ref 12–16)
IMM GRANULOCYTES NFR BLD AUTO: 0.2 % — SIGNIFICANT CHANGE UP (ref 0.1–0.3)
LYMPHOCYTES # BLD AUTO: 0.44 K/UL — LOW (ref 1.2–3.4)
LYMPHOCYTES # BLD AUTO: 10.8 % — LOW (ref 20.5–51.1)
MCHC RBC-ENTMCNC: 30.3 PG — SIGNIFICANT CHANGE UP (ref 27–31)
MCHC RBC-ENTMCNC: 31.9 G/DL — LOW (ref 32–37)
MCV RBC AUTO: 94.8 FL — SIGNIFICANT CHANGE UP (ref 81–99)
MONOCYTES # BLD AUTO: 0.5 K/UL — SIGNIFICANT CHANGE UP (ref 0.1–0.6)
MONOCYTES NFR BLD AUTO: 12.2 % — HIGH (ref 1.7–9.3)
NEUTROPHILS # BLD AUTO: 3.04 K/UL — SIGNIFICANT CHANGE UP (ref 1.4–6.5)
NEUTROPHILS NFR BLD AUTO: 74.3 % — SIGNIFICANT CHANGE UP (ref 42.2–75.2)
NRBC # BLD: 0 /100 WBCS — SIGNIFICANT CHANGE UP (ref 0–0)
PLATELET # BLD AUTO: 254 K/UL — SIGNIFICANT CHANGE UP (ref 130–400)
POTASSIUM SERPL-MCNC: 4.3 MMOL/L — SIGNIFICANT CHANGE UP (ref 3.5–5)
POTASSIUM SERPL-SCNC: 4.3 MMOL/L — SIGNIFICANT CHANGE UP (ref 3.5–5)
PROT SERPL-MCNC: 5.6 G/DL — LOW (ref 6–8)
RBC # BLD: 3.47 M/UL — LOW (ref 4.2–5.4)
RBC # FLD: 14.1 % — SIGNIFICANT CHANGE UP (ref 11.5–14.5)
SODIUM SERPL-SCNC: 142 MMOL/L — SIGNIFICANT CHANGE UP (ref 135–146)
WBC # BLD: 4.09 K/UL — LOW (ref 4.8–10.8)
WBC # FLD AUTO: 4.09 K/UL — LOW (ref 4.8–10.8)

## 2019-04-24 RX ORDER — SENNA PLUS 8.6 MG/1
2 TABLET ORAL AT BEDTIME
Qty: 0 | Refills: 0 | Status: DISCONTINUED | OUTPATIENT
Start: 2019-04-24 | End: 2019-04-29

## 2019-04-24 RX ORDER — MINERAL OIL
133 OIL (ML) MISCELLANEOUS ONCE
Qty: 0 | Refills: 0 | Status: COMPLETED | OUTPATIENT
Start: 2019-04-24 | End: 2019-04-24

## 2019-04-24 RX ADMIN — PIPERACILLIN AND TAZOBACTAM 25 GRAM(S): 4; .5 INJECTION, POWDER, LYOPHILIZED, FOR SOLUTION INTRAVENOUS at 21:24

## 2019-04-24 RX ADMIN — SODIUM CHLORIDE 100 MILLILITER(S): 9 INJECTION INTRAMUSCULAR; INTRAVENOUS; SUBCUTANEOUS at 11:21

## 2019-04-24 RX ADMIN — GABAPENTIN 300 MILLIGRAM(S): 400 CAPSULE ORAL at 21:24

## 2019-04-24 RX ADMIN — OLANZAPINE 2.5 MILLIGRAM(S): 15 TABLET, FILM COATED ORAL at 05:17

## 2019-04-24 RX ADMIN — PIPERACILLIN AND TAZOBACTAM 25 GRAM(S): 4; .5 INJECTION, POWDER, LYOPHILIZED, FOR SOLUTION INTRAVENOUS at 05:15

## 2019-04-24 RX ADMIN — OLANZAPINE 2.5 MILLIGRAM(S): 15 TABLET, FILM COATED ORAL at 18:01

## 2019-04-24 RX ADMIN — SIMVASTATIN 20 MILLIGRAM(S): 20 TABLET, FILM COATED ORAL at 21:24

## 2019-04-24 RX ADMIN — LOSARTAN POTASSIUM 100 MILLIGRAM(S): 100 TABLET, FILM COATED ORAL at 05:16

## 2019-04-24 RX ADMIN — ENOXAPARIN SODIUM 40 MILLIGRAM(S): 100 INJECTION SUBCUTANEOUS at 11:20

## 2019-04-24 RX ADMIN — PIPERACILLIN AND TAZOBACTAM 25 GRAM(S): 4; .5 INJECTION, POWDER, LYOPHILIZED, FOR SOLUTION INTRAVENOUS at 13:25

## 2019-04-24 RX ADMIN — SENNA PLUS 2 TABLET(S): 8.6 TABLET ORAL at 21:24

## 2019-04-24 RX ADMIN — SODIUM CHLORIDE 100 MILLILITER(S): 9 INJECTION INTRAMUSCULAR; INTRAVENOUS; SUBCUTANEOUS at 00:51

## 2019-04-24 RX ADMIN — Medication 81 MILLIGRAM(S): at 11:20

## 2019-04-24 NOTE — CONSULT NOTE ADULT - ASSESSMENT
IMPRESSION: Rehab of gait ab PVD    PRECAUTIONS: [  x  ] Cardiac  [    ] Respiratory  [    ] Seizures [    ] Contact Isolation  [    ] Droplet Isolation  [    ] Other    Weight Bearing Status: WBAT    RECOMMENDATION:    Out of Bed to Chair     DVT/Decubiti Prophylaxis    REHAB PLAN:     [  x   ] Bedside P/T 3-5 times a week   [     ] Bedside O/T  2-3 times a week   [     ] No Rehab Therapy Indicated   [     ]  Speech Therapy   Conditioning/ROM                                 ADL  Bed Mobility                                            Conditioning/ROM  Transfers                                                  Bed Mobility  Sitting /Standing Balance                      Transfers                                        Gait Training                                            Sitting/Standing Balance  Stair Training [   ]Applicable                 Home equipment Eval                                                                     Splinting  [   ] Only      GOALS:   ADL   [ x   ]   Independent         Transfers  [x    ] Independent            Ambulation  [    x ] Independent     [ x    ] With device                            [    ]  CG                                               [    ]  CG                                                    [     ] CG                            [    ] Min A                                          [    ] Min A                                                [     ] Min  A          DISCHARGE PLAN:   [     ]  Good candidate for Intensive Rehabilitation/Hospital based                                             Will tolerate 3hrs Intensive Rehab Daily                                       [      ]  Short Term Rehab in Skilled Nursing Facility                                       [  x    ]  Home with Outpatient or  services                                         [      ]  Possible Candidate for Intensive Hospital based Rehab

## 2019-04-24 NOTE — PROGRESS NOTE ADULT - ASSESSMENT
infected left lower leg venous stasis ulcer post debridement and wound vac--> iv abx, check culture, will change wound vac in  a m

## 2019-04-24 NOTE — PHYSICAL THERAPY INITIAL EVALUATION ADULT - GENERAL OBSERVATIONS, REHAB EVAL
Pt seen [phone] for a total of 45 minutes. Pt encountered semifowlers in bed, no apparent distress, agreeable to physical therapy, + wound vac, + IV (disconnected by nurse Stevens).

## 2019-04-24 NOTE — PROGRESS NOTE ADULT - SUBJECTIVE AND OBJECTIVE BOX
SUBJECTIVE:    Patient is a 85y old Female who presents with a chief complaint of   Currently admitted to medicine with the primary diagnosis of Skin ulcer of left calf, with unspecified severity     Today is hospital day 2d. This morning she is resting comfortably in bed and reports no new issues or overnight events. Patient advised that it is important to participate with PT after her procedure and patient is amennable.  Denies any complaints; no fever, chills, abdominal pain, urinary symptoms     PAST MEDICAL & SURGICAL HISTORY  PVD (peripheral vascular disease)  Skin ulcer of left ankle, limited to breakdown of skin  Leg swelling  HTN (hypertension)    SOCIAL HISTORY:  Negative for smoking/alcohol/drug use.     ALLERGIES:  vancomycin (Flushing)    MEDICATIONS:  STANDING MEDICATIONS  aspirin enteric coated 81 milliGRAM(s) Oral daily  chlorhexidine 4% Liquid 1 Application(s) Topical <User Schedule>  enoxaparin Injectable 40 milliGRAM(s) SubCutaneous daily  gabapentin 300 milliGRAM(s) Oral at bedtime  losartan 100 milliGRAM(s) Oral daily  OLANZapine 2.5 milliGRAM(s) Oral two times a day  piperacillin/tazobactam IVPB. 3.375 Gram(s) IV Intermittent every 8 hours  senna 2 Tablet(s) Oral at bedtime  simvastatin 20 milliGRAM(s) Oral at bedtime  sodium chloride 0.9%. 1000 milliLiter(s) IV Continuous <Continuous>    PRN MEDICATIONS  acetaminophen   Tablet .. 650 milliGRAM(s) Oral every 6 hours PRN  morphine  - Injectable 2 milliGRAM(s) IV Push every 6 hours PRN  oxyCODONE    5 mG/acetaminophen 325 mG 1 Tablet(s) Oral every 6 hours PRN    VITALS:   T(F): 96.7  HR: 59  BP: 134/64  RR: 18  SpO2: 97%    LABS:                        10.5   4.09  )-----------( 254      ( 24 Apr 2019 08:25 )             32.9     04-24    142  |  107  |  14  ----------------------------<  124<H>  4.3   |  24  |  0.7    Ca    8.4<L>      24 Apr 2019 08:25  Phos  3.9     04-22  Mg     2.0     04-23    TPro  5.6<L>  /  Alb  3.3<L>  /  TBili  0.3  /  DBili  x   /  AST  11  /  ALT  5   /  AlkPhos  89  04-24    PT/INR - ( 23 Apr 2019 07:22 )   PT: 12.00 sec;   INR: 1.04 ratio         PTT - ( 23 Apr 2019 07:22 )  PTT:31.9 sec      RADIOLOGY:        PHYSICAL EXAM:  GEN: No acute distress  LUNGS: Clear to auscultation bilaterally   HEART: S1/S2 present. RRR.   ABD: Soft, non-tender, non-distended. Bowel sounds present  EXT: LLE wrapped in bandage, dry and intact/ NC/NC/NE/2+PP/WU/Skin Intact.   NEURO: AAOX3    Intravenous access:   NG tube:   Saha cathter:

## 2019-04-24 NOTE — PHYSICAL THERAPY INITIAL EVALUATION ADULT - IMPAIRMENTS FOUND, PT EVAL
poor safety awareness/arousal, attention, and cognition/gait, locomotion, and balance/ergonomics and body mechanics/gross motor/aerobic capacity/endurance/integumentary integrity/muscle strength/posture/joint integrity and mobility/ROM

## 2019-04-24 NOTE — PROGRESS NOTE ADULT - ASSESSMENT
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    # Venous stasis ulcer with adherent necrotic tissue left lower leg--> excision to and including fascia  - s/p Debridement of wound, excision to and including fascia  - f/u wound culture of debrided tissues   - burn recommends wound vac on d/c   - c/w  wound care  - c/w zosyn @ 3.375 gm q8h  - ID consult appreciated:  -f/u OR cultures - Continue with Zosyn 3.375g q8hr at this time - ESR CRP  - monitor vitals  - morphine for pain prn  - f/u Physiatry     # HTN  - losartan 100mg daily  - monitor vitals    # PVD  - follows up with  outpatient    # DLD  - c/w simvastatin daily    Activity: ambulate as tolerated  diet: dash  dvt ppx: lovenox 40mg daily  gi ppx: not indicated  full code  dispo: pending

## 2019-04-24 NOTE — PHYSICAL THERAPY INITIAL EVALUATION ADULT - PLANNED THERAPY INTERVENTIONS, PT EVAL
stair negotiation/balance training/stretching/transfer training/ROM/gait training/strengthening/bed mobility training

## 2019-04-24 NOTE — CONSULT NOTE ADULT - SUBJECTIVE AND OBJECTIVE BOX
Patient is a 85y old  Female who presents with a chief complaint of   HPI:  84 y/o female with pmh of PVD, chronic wounds to the left lower extremity, HTN, presents to the ED with worsening ulceration of the LLE. Patient has had chronic wounds on the left ankle that she developed after a fall.  Since the fall the wounds never healed and the patient has chronic leg swelling. Patient reports that these wounds have drained significantly over the past few weeks.  She was admitted in march for infected wounds which were treated.  At the time arterial duplex showed stenosis of the SFA she underwent left SFA angioplasty on 3/12/19 with Dr. Crenshaw. During outpatient arterial duplex performed during postoperative visit 03/28 showed patent left femoropopliteal arteries without any restenosis. (22 Apr 2019 02:23)      PAST MEDICAL & SURGICAL HISTORY:  PVD (peripheral vascular disease)  Skin ulcer of left ankle, limited to breakdown of skin  Leg swelling  HTN (hypertension)      Hospital Course:enous stasis ulcer with adherent necrotic tissue left lower leg--> excision to and including fascia  - s/p Debridement of wound, excision to and including fascia  - f/u wound culture of debrided tissues   - burn recommends wound vac on d/c   - c/w  wound care  - c/w zosyn @ 3.375 gm q8h  - ID consult appreciated:  -f/u OR cultures - Continue with Zosyn 3.375g q8hr at this time - ESR CRP  - monitor vitals  - morphine for pain prn    TODAY'S SUBJECTIVE & REVIEW OF SYMPTOMS:     Constitutional WNL   Cardio WNL   Resp WNL   GI WNL  Heme WNL  Endo WNL  Skin WNL  MSK WNL  Neuro WNL  Cognitive WNL  Psych WNL      MEDICATIONS  (STANDING):  aspirin enteric coated 81 milliGRAM(s) Oral daily  chlorhexidine 4% Liquid 1 Application(s) Topical <User Schedule>  enoxaparin Injectable 40 milliGRAM(s) SubCutaneous daily  gabapentin 300 milliGRAM(s) Oral at bedtime  losartan 100 milliGRAM(s) Oral daily  OLANZapine 2.5 milliGRAM(s) Oral two times a day  piperacillin/tazobactam IVPB. 3.375 Gram(s) IV Intermittent every 8 hours  senna 2 Tablet(s) Oral at bedtime  simvastatin 20 milliGRAM(s) Oral at bedtime  sodium chloride 0.9%. 1000 milliLiter(s) (100 mL/Hr) IV Continuous <Continuous>    MEDICATIONS  (PRN):  acetaminophen   Tablet .. 650 milliGRAM(s) Oral every 6 hours PRN Moderate Pain (4 - 6)  morphine  - Injectable 2 milliGRAM(s) IV Push every 6 hours PRN Severe Pain (7 - 10)  oxyCODONE    5 mG/acetaminophen 325 mG 1 Tablet(s) Oral every 6 hours PRN Severe Pain (7 - 10)      FAMILY HISTORY:      Allergies    vancomycin (Flushing)    Intolerances        SOCIAL HISTORY:    [    ] Etoh  [    ] Smoking  [    ] Substance abuse     Home Environment:  [    ] Home Alone  [   x ] Lives with Family  [    ] Home Health Aid    Dwelling:  [    ] Apartment  [  x  ] Private House  [    ] Adult Home  [    ] Skilled Nursing Facility      [    ] Short Term  [    ] Long Term  [   x ] Stairs       4 outside                    [    ] Elevator     FUNCTIONAL STATUS PTA: (Check all that apply)  Ambulation: [  x   ]Independent    [    ] Dependent     [    ] Non-Ambulatory  Assistive Device: [    ] SA Cane  [    ]  Q Cane  [ x   ] Walker  [    ]  Wheelchair  ADL : [  x  ] Independent  [    ]  Dependent   +VN PTA    Vital Signs Last 24 Hrs  T(C): 35.6 (24 Apr 2019 12:14), Max: 36.2 (23 Apr 2019 21:34)  T(F): 96 (24 Apr 2019 12:14), Max: 97.2 (23 Apr 2019 21:34)  HR: 60 (24 Apr 2019 12:14) (59 - 74)  BP: 153/66 (24 Apr 2019 12:14) (98/54 - 153/66)  BP(mean): --  RR: 18 (24 Apr 2019 12:14) (18 - 18)  SpO2: 97% (24 Apr 2019 07:58) (97% - 97%)      PHYSICAL EXAM: Alert & Oriented X3  GENERAL: NAD, well-groomed, well-developed  HEAD:  Atraumatic, Normocephalic  EYES: EOMI, PERRLA, conjunctiva and sclera clear  NECK: Supple  CHEST/LUNG: Clear bilaterally  HEART: Regular rate and rhythm  ABDOMEN: Soft, Nontender, Nondistended; Bowel sounds present  EXTREMITIES:  Doug LES bandaged- LLE with vac    NERVOUS SYSTEM:  Cranial Nerves 2-12 intact [  x  ] Abnormal  [    ]  ROM: WFL all extremities [  x  ]  Abnormal [     ]  Motor Strength: WFL all extremities  [    ]  Abnormal [   x ]5/5 except 4/5 LLE  Sensation: intact to light touch [  x  ] Abnormal [    ]      FUNCTIONAL STATUS:  Bed Mobility: [   ]  Independent [   x ]  Supervision [    ]  Needs Assistance [  ]  N/A  Transfers: [    ]  Independent [   x ]  Supervision [    ]  Needs Assistance [    ]  N/A    Ambulation:  [    ]  Independent [    ]  Supervision [  x  ]  Needs Assistance [    ]  N/A   ADL:  [    ]   Independent [    ] Requires Assistance [  x  ] N/A   SEEN BY PT- SUPERVISION TRANSFERS< CG RW 50' X2    LABS:                        10.5   4.09  )-----------( 254      ( 24 Apr 2019 08:25 )             32.9     04-24    142  |  107  |  14  ----------------------------<  124<H>  4.3   |  24  |  0.7    Ca    8.4<L>      24 Apr 2019 08:25  Mg     2.0     04-23    TPro  5.6<L>  /  Alb  3.3<L>  /  TBili  0.3  /  DBili  x   /  AST  11  /  ALT  5   /  AlkPhos  89  04-24    PT/INR - ( 23 Apr 2019 07:22 )   PT: 12.00 sec;   INR: 1.04 ratio         PTT - ( 23 Apr 2019 07:22 )  PTT:31.9 sec      RADIOLOGY & ADDITIONAL STUDIES:

## 2019-04-24 NOTE — PHYSICAL THERAPY INITIAL EVALUATION ADULT - CRITERIA FOR SKILLED THERAPEUTIC INTERVENTIONS
rehab potential/predicted duration of therapy intervention/risk reduction/prevention/functional limitations in following categories/therapy frequency/impairments found/anticipated equipment needs at discharge

## 2019-04-24 NOTE — PHYSICAL THERAPY INITIAL EVALUATION ADULT - TRANSFER SAFETY CONCERNS NOTED: SIT/STAND, REHAB EVAL
decreased balance during turns/decreased safety awareness/decreased proprioception/decreased weight-shifting ability

## 2019-04-24 NOTE — PROGRESS NOTE ADULT - ATTENDING COMMENTS
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    # Venous stasis ulcer with adherent necrotic tissue left lower leg/ Infected stasis ulcers of LLE -->  - s/p Debridement of wound, excision to and including fascia  - f/u wound culture of debrided tissues   - burn recommends wound vac for now and on d/c   - c/w  wound care  - c/w zosyn @ 3.375 gm q8h  - ID consult appreciated:  -f/u OR cultures - Continue with Zosyn 3.375g q8hr at this time - ESR CRP  - monitor vitals  - morphine for pain prn  - f/u Physiatry     # HTN  - losartan 100mg daily  - monitor vitals    # PVD  - follows up with  outpatient    # DLD  - c/w simvastatin daily    Activity: ambulate as tolerated  diet: dash  dvt ppx: lovenox 40mg daily  gi ppx: not indicated  full code  dispo: pending    #HANDOFF: ACTIVE AS ABOVE. IV ANTIBIOTIC. F/U CULTURES.

## 2019-04-24 NOTE — PHYSICAL THERAPY INITIAL EVALUATION ADULT - GAIT PATTERN USED, PT EVAL
3-point gait/3 point step to gait pattern, pt had difficulty putting weight through LLE 2 to surgery there yesterday

## 2019-04-24 NOTE — PHYSICAL THERAPY INITIAL EVALUATION ADULT - MD ORDER
LLE wound debridement with burn team. Spoke with Joceline from burn who reports pt can ambulate with wound vac and is WBAT.

## 2019-04-25 ENCOUNTER — APPOINTMENT (OUTPATIENT)
Dept: VASCULAR SURGERY | Facility: CLINIC | Age: 84
End: 2019-04-25

## 2019-04-25 LAB
-  AMIKACIN: SIGNIFICANT CHANGE UP
-  AMPICILLIN/SULBACTAM: SIGNIFICANT CHANGE UP
-  AZTREONAM: SIGNIFICANT CHANGE UP
-  CEFAZOLIN: SIGNIFICANT CHANGE UP
-  CEFEPIME: SIGNIFICANT CHANGE UP
-  CEFTAZIDIME: SIGNIFICANT CHANGE UP
-  CIPROFLOXACIN: SIGNIFICANT CHANGE UP
-  CLINDAMYCIN: SIGNIFICANT CHANGE UP
-  ERYTHROMYCIN: SIGNIFICANT CHANGE UP
-  GENTAMICIN: SIGNIFICANT CHANGE UP
-  GENTAMICIN: SIGNIFICANT CHANGE UP
-  IMIPENEM: SIGNIFICANT CHANGE UP
-  LEVOFLOXACIN: SIGNIFICANT CHANGE UP
-  MEROPENEM: SIGNIFICANT CHANGE UP
-  OXACILLIN: SIGNIFICANT CHANGE UP
-  PENICILLIN: SIGNIFICANT CHANGE UP
-  PIPERACILLIN/TAZOBACTAM: SIGNIFICANT CHANGE UP
-  RIFAMPIN: SIGNIFICANT CHANGE UP
-  TETRACYCLINE: SIGNIFICANT CHANGE UP
-  TOBRAMYCIN: SIGNIFICANT CHANGE UP
-  TRIMETHOPRIM/SULFAMETHOXAZOLE: SIGNIFICANT CHANGE UP
-  VANCOMYCIN: SIGNIFICANT CHANGE UP
ALBUMIN SERPL ELPH-MCNC: 3.5 G/DL — SIGNIFICANT CHANGE UP (ref 3.5–5.2)
ALP SERPL-CCNC: 103 U/L — SIGNIFICANT CHANGE UP (ref 30–115)
ALT FLD-CCNC: 9 U/L — SIGNIFICANT CHANGE UP (ref 0–41)
ANION GAP SERPL CALC-SCNC: 10 MMOL/L — SIGNIFICANT CHANGE UP (ref 7–14)
AST SERPL-CCNC: 16 U/L — SIGNIFICANT CHANGE UP (ref 0–41)
BASOPHILS # BLD AUTO: 0.02 K/UL — SIGNIFICANT CHANGE UP (ref 0–0.2)
BASOPHILS NFR BLD AUTO: 0.5 % — SIGNIFICANT CHANGE UP (ref 0–1)
BILIRUB SERPL-MCNC: 0.4 MG/DL — SIGNIFICANT CHANGE UP (ref 0.2–1.2)
BUN SERPL-MCNC: 13 MG/DL — SIGNIFICANT CHANGE UP (ref 10–20)
CALCIUM SERPL-MCNC: 9 MG/DL — SIGNIFICANT CHANGE UP (ref 8.5–10.1)
CHLORIDE SERPL-SCNC: 107 MMOL/L — SIGNIFICANT CHANGE UP (ref 98–110)
CO2 SERPL-SCNC: 27 MMOL/L — SIGNIFICANT CHANGE UP (ref 17–32)
CREAT SERPL-MCNC: 0.7 MG/DL — SIGNIFICANT CHANGE UP (ref 0.7–1.5)
CRP SERPL-MCNC: 2.36 MG/DL — HIGH (ref 0–0.4)
EOSINOPHIL # BLD AUTO: 0.35 K/UL — SIGNIFICANT CHANGE UP (ref 0–0.7)
EOSINOPHIL NFR BLD AUTO: 9.6 % — HIGH (ref 0–8)
ERYTHROCYTE [SEDIMENTATION RATE] IN BLOOD: 35 MM/HR — HIGH (ref 0–20)
GLUCOSE SERPL-MCNC: 94 MG/DL — SIGNIFICANT CHANGE UP (ref 70–99)
HCT VFR BLD CALC: 36.8 % — LOW (ref 37–47)
HGB BLD-MCNC: 11.6 G/DL — LOW (ref 12–16)
IMM GRANULOCYTES NFR BLD AUTO: 0.5 % — HIGH (ref 0.1–0.3)
LYMPHOCYTES # BLD AUTO: 0.59 K/UL — LOW (ref 1.2–3.4)
LYMPHOCYTES # BLD AUTO: 16.2 % — LOW (ref 20.5–51.1)
MCHC RBC-ENTMCNC: 29.7 PG — SIGNIFICANT CHANGE UP (ref 27–31)
MCHC RBC-ENTMCNC: 31.5 G/DL — LOW (ref 32–37)
MCV RBC AUTO: 94.4 FL — SIGNIFICANT CHANGE UP (ref 81–99)
METHOD TYPE: SIGNIFICANT CHANGE UP
METHOD TYPE: SIGNIFICANT CHANGE UP
MONOCYTES # BLD AUTO: 0.37 K/UL — SIGNIFICANT CHANGE UP (ref 0.1–0.6)
MONOCYTES NFR BLD AUTO: 10.1 % — HIGH (ref 1.7–9.3)
NEUTROPHILS # BLD AUTO: 2.3 K/UL — SIGNIFICANT CHANGE UP (ref 1.4–6.5)
NEUTROPHILS NFR BLD AUTO: 63.1 % — SIGNIFICANT CHANGE UP (ref 42.2–75.2)
NRBC # BLD: 0 /100 WBCS — SIGNIFICANT CHANGE UP (ref 0–0)
PLATELET # BLD AUTO: 275 K/UL — SIGNIFICANT CHANGE UP (ref 130–400)
POTASSIUM SERPL-MCNC: 4.2 MMOL/L — SIGNIFICANT CHANGE UP (ref 3.5–5)
POTASSIUM SERPL-SCNC: 4.2 MMOL/L — SIGNIFICANT CHANGE UP (ref 3.5–5)
PROT SERPL-MCNC: 6.1 G/DL — SIGNIFICANT CHANGE UP (ref 6–8)
RBC # BLD: 3.9 M/UL — LOW (ref 4.2–5.4)
RBC # FLD: 14.4 % — SIGNIFICANT CHANGE UP (ref 11.5–14.5)
SODIUM SERPL-SCNC: 144 MMOL/L — SIGNIFICANT CHANGE UP (ref 135–146)
SURGICAL PATHOLOGY STUDY: SIGNIFICANT CHANGE UP
WBC # BLD: 3.65 K/UL — LOW (ref 4.8–10.8)
WBC # FLD AUTO: 3.65 K/UL — LOW (ref 4.8–10.8)

## 2019-04-25 RX ADMIN — SIMVASTATIN 20 MILLIGRAM(S): 20 TABLET, FILM COATED ORAL at 21:51

## 2019-04-25 RX ADMIN — OXYCODONE AND ACETAMINOPHEN 1 TABLET(S): 5; 325 TABLET ORAL at 18:39

## 2019-04-25 RX ADMIN — PIPERACILLIN AND TAZOBACTAM 25 GRAM(S): 4; .5 INJECTION, POWDER, LYOPHILIZED, FOR SOLUTION INTRAVENOUS at 06:06

## 2019-04-25 RX ADMIN — PIPERACILLIN AND TAZOBACTAM 25 GRAM(S): 4; .5 INJECTION, POWDER, LYOPHILIZED, FOR SOLUTION INTRAVENOUS at 13:46

## 2019-04-25 RX ADMIN — OLANZAPINE 2.5 MILLIGRAM(S): 15 TABLET, FILM COATED ORAL at 17:17

## 2019-04-25 RX ADMIN — OLANZAPINE 2.5 MILLIGRAM(S): 15 TABLET, FILM COATED ORAL at 06:07

## 2019-04-25 RX ADMIN — ENOXAPARIN SODIUM 40 MILLIGRAM(S): 100 INJECTION SUBCUTANEOUS at 11:37

## 2019-04-25 RX ADMIN — PIPERACILLIN AND TAZOBACTAM 25 GRAM(S): 4; .5 INJECTION, POWDER, LYOPHILIZED, FOR SOLUTION INTRAVENOUS at 21:51

## 2019-04-25 RX ADMIN — SENNA PLUS 2 TABLET(S): 8.6 TABLET ORAL at 21:52

## 2019-04-25 RX ADMIN — Medication 81 MILLIGRAM(S): at 11:37

## 2019-04-25 RX ADMIN — GABAPENTIN 300 MILLIGRAM(S): 400 CAPSULE ORAL at 21:51

## 2019-04-25 RX ADMIN — OXYCODONE AND ACETAMINOPHEN 1 TABLET(S): 5; 325 TABLET ORAL at 18:09

## 2019-04-25 RX ADMIN — LOSARTAN POTASSIUM 100 MILLIGRAM(S): 100 TABLET, FILM COATED ORAL at 06:07

## 2019-04-25 NOTE — PROGRESS NOTE ADULT - SUBJECTIVE AND OBJECTIVE BOX
CHIEF COMPLAINT:    Patient is a 85y old  Female who presents with a chief complaint of chronic ulceration to LLE     INTERVAL HPI/OVERNIGHT EVENTS:    Patient seen and examined at bedside. No acute overnight events occurred.    ROS: Denies leg pain. All other systems are negative.    Vital Signs:    T(F): 99.1 (04-25-19 @ 12:32), Max: 99.1 (04-25-19 @ 12:32)  HR: 78 (04-25-19 @ 12:32) (60 - 78)  BP: 142/73 (04-25-19 @ 12:32) (133/58 - 177/81)  RR: 15 (04-25-19 @ 12:32) (15 - 18)  SpO2: --  I&O's Summary    24 Apr 2019 07:01  -  25 Apr 2019 07:00  --------------------------------------------------------  IN: 410 mL / OUT: 0 mL / NET: 410 mL    25 Apr 2019 07:01  -  25 Apr 2019 16:20  --------------------------------------------------------  IN: 0 mL / OUT: 800 mL / NET: -800 mL    PHYSICAL EXAM:  GENERAL:  NAD  SKIN: No rashes or lesions  HEENT: Atraumatic. Normocephalic. Anicteric  NECK:  No JVD.   PULMONARY: Clear to ausculation bilaterally. No wheezing. No rales  CVS: Normal S1, S2. Regular rate and rhythm. No murmurs.  ABDOMEN/GI: Soft, Nontender, Nondistended; Bowel sounds are present  EXTREMITIES:  b/l LE ace wrapped, wound vac in place. Dressings clean, dry, intact  NEUROLOGIC:  No motor deficit.  PSYCH: Alert & oriented x 3, normal affect    Consultant(s) Notes Reviewed:  [x ] YES  [ ] NO      LABS:                        11.6   3.65  )-----------( 275      ( 25 Apr 2019 08:28 )             36.8     04-25    144  |  107  |  13  ----------------------------<  94  4.2   |  27  |  0.7    Ca    9.0      25 Apr 2019 08:28    TPro  6.1  /  Alb  3.5  /  TBili  0.4  /  DBili  x   /  AST  16  /  ALT  9   /  AlkPhos  103  04-25      Culture - Surgical Swab (collected 23 Apr 2019 13:59)  Source: .Surgical Swab None  Preliminary Report (24 Apr 2019 23:14):    Few Gram Negative Rods    Rare Staphylococcus species      RADIOLOGY & ADDITIONAL TESTS:  No new imaging    Medications:  Standing  aspirin enteric coated 81 milliGRAM(s) Oral daily  chlorhexidine 4% Liquid 1 Application(s) Topical <User Schedule>  enoxaparin Injectable 40 milliGRAM(s) SubCutaneous daily  gabapentin 300 milliGRAM(s) Oral at bedtime  losartan 100 milliGRAM(s) Oral daily  OLANZapine 2.5 milliGRAM(s) Oral two times a day  piperacillin/tazobactam IVPB. 3.375 Gram(s) IV Intermittent every 8 hours  senna 2 Tablet(s) Oral at bedtime  simvastatin 20 milliGRAM(s) Oral at bedtime    PRN Meds  acetaminophen   Tablet .. 650 milliGRAM(s) Oral every 6 hours PRN  morphine  - Injectable 2 milliGRAM(s) IV Push every 6 hours PRN  oxyCODONE    5 mG/acetaminophen 325 mG 1 Tablet(s) Oral every 6 hours PRN      Case discussed with resident  Care discussed with pt

## 2019-04-25 NOTE — PROGRESS NOTE ADULT - SUBJECTIVE AND OBJECTIVE BOX
SUBJECTIVE:    Patient is a 85y old Female who presents with a chief complaint of   Currently admitted to medicine with the primary diagnosis of Skin ulcer of left calf, with unspecified severity     Today is hospital day 3d. Patient reports intermittent pain to her LLE, especially when attempting to ambulate.  Patient amenable to continued PT and aware that final abx tx is pending final cultures and that she will need to be d/ed with Wound vac, to which she was initially resistant.    PAST MEDICAL & SURGICAL HISTORY  PVD (peripheral vascular disease)  Skin ulcer of left ankle, limited to breakdown of skin  Leg swelling  HTN (hypertension)    SOCIAL HISTORY:  Negative for smoking/alcohol/drug use.     ALLERGIES:  vancomycin (Flushing)    MEDICATIONS:  STANDING MEDICATIONS  aspirin enteric coated 81 milliGRAM(s) Oral daily  chlorhexidine 4% Liquid 1 Application(s) Topical <User Schedule>  enoxaparin Injectable 40 milliGRAM(s) SubCutaneous daily  gabapentin 300 milliGRAM(s) Oral at bedtime  losartan 100 milliGRAM(s) Oral daily  OLANZapine 2.5 milliGRAM(s) Oral two times a day  piperacillin/tazobactam IVPB. 3.375 Gram(s) IV Intermittent every 8 hours  senna 2 Tablet(s) Oral at bedtime  simvastatin 20 milliGRAM(s) Oral at bedtime    PRN MEDICATIONS  acetaminophen   Tablet .. 650 milliGRAM(s) Oral every 6 hours PRN  morphine  - Injectable 2 milliGRAM(s) IV Push every 6 hours PRN  oxyCODONE    5 mG/acetaminophen 325 mG 1 Tablet(s) Oral every 6 hours PRN    VITALS:   T(F): 99.1  HR: 78  BP: 142/73  RR: 15  SpO2: --    LABS:                        11.6   3.65  )-----------( 275      ( 25 Apr 2019 08:28 )             36.8     04-25    144  |  107  |  13  ----------------------------<  94  4.2   |  27  |  0.7    Ca    9.0      25 Apr 2019 08:28    TPro  6.1  /  Alb  3.5  /  TBili  0.4  /  DBili  x   /  AST  16  /  ALT  9   /  AlkPhos  103  04-25          Sedimentation Rate, Erythrocyte: 35 mm/hr <H> (04-25-19 @ 08:28)      Culture - Surgical Swab (collected 23 Apr 2019 13:59)  Source: .Surgical Swab None  Preliminary Report (24 Apr 2019 23:14):    Few Gram Negative Rods    Rare Staphylococcus species          RADIOLOGY:        PHYSICAL EXAM:  GEN: No acute distress  LUNGS: Clear to auscultation bilaterally, no wheezrs, rales or rhonchi  HEART: S1/S2 present. RRR, no murmurs, rubs or gallops   ABD: Soft, non-tender, non-distended. Bowel sounds present  EXT: LLE wrapped in bandage, dry and intact/ NC/NC/NE/2+PP/WU/Skin Intact.   NEURO: AAOX3    Intravenous access:   NG tube:   Saha cathter:

## 2019-04-25 NOTE — PROGRESS NOTE ADULT - ASSESSMENT
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    # Venous stasis ulcer with adherent necrotic tissue left lower leg--> excision to and including fascia  - s/p Debridement of wound, excision to and including fascia  - burn recommends wound vac on d/c   - c/w  wound care  - ID consult appreciated: - f/u OR cultures - Continue with Zosyn 3.375g q8hr at this time  - ESR: 35; f/u CRP  - Physiatry consult appreciated:    [  x    ]  Home with Outpatient or VN services    # HTN  - losartan 100mg daily  - monitor vitals    # PVD  - follows up with  outpatient  - vascular consult appreciated: Pt has chronic ulceration of the leg. there is no indication for intervention at this time. Continue local wound care. F/u as outpatient.      # DLD  - c/w simvastatin daily    Activity: ambulate as tolerated  diet: dash  dvt ppx: lovenox 40mg daily  gi ppx: not indicated  full code  dispo: pending

## 2019-04-25 NOTE — PROGRESS NOTE ADULT - ASSESSMENT
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    Venous stasis ulcer with adherent necrotic tissue left lower leg/ Infected stasis ulcers of LLE  - s/p Debridement of wound, excision to and including fascia  - wound culture Culture Few Gram Negative Rods, Rare Staphylococcus species   - awaiting final report  - ID consult appreciated, c/w zosyn for now  - pain control  - wound vac in place    HTN  - losartan 100mg daily  - controlled    PVD/ PAD  - as per housestaff conversation with vascular today no acute intervention for moderate SFA disease  - follows up with  outpatient    DLD  - c/w simvastatin daily    #Progress Note Handoff:  Pending (specify):  Wound cultures  Family discussion: d/w  and pt today. Understands c/w antibiotics for now  Disposition: Home___/SNF___/Other________/Unknown at this time___x_____

## 2019-04-25 NOTE — PROGRESS NOTE ADULT - SUBJECTIVE AND OBJECTIVE BOX
KALI ANDERS  85y, Female      OVERNIGHT EVENTS:  afebrile  Culture - Surgical Swab (collected 04-23-19 @ 13:59)    Few Gram Negative Rods    Rare Staphylococcus species      ROS negative except as per above    VITALS:  T(F): 96, Max: 97.5 (04-24-19 @ 21:52)  HR: 60  BP: 177/81  RR: 18Vital Signs Last 24 Hrs  T(C): 35.6 (25 Apr 2019 04:46), Max: 36.4 (24 Apr 2019 21:52)  T(F): 96 (25 Apr 2019 04:46), Max: 97.5 (24 Apr 2019 21:52)  HR: 60 (25 Apr 2019 04:46) (60 - 63)  BP: 177/81 (25 Apr 2019 04:46) (133/58 - 177/81)  BP(mean): --  RR: 18 (25 Apr 2019 04:46) (18 - 18)  SpO2: --    PHYSICAL EXAM:  Gen: Awake and alert, non-toxic appearing, NAD  HEENT: NCAT.   CV: RRR  Lungs: CTAB  Abd: Soft. NTND  Extr: wwp, dressings b/l wound vac  Skin: no rash  Neuro: No focal deficits  Lines: clean    TESTS & MEASUREMENTS:                        11.6   3.65  )-----------( 275      ( 25 Apr 2019 08:28 )             36.8     04-25    144  |  107  |  13  ----------------------------<  94  4.2   |  27  |  0.7    Ca    9.0      25 Apr 2019 08:28    TPro  6.1  /  Alb  3.5  /  TBili  0.4  /  DBili  x   /  AST  16  /  ALT  9   /  AlkPhos  103  04-25    LIVER FUNCTIONS - ( 25 Apr 2019 08:28 )  Alb: 3.5 g/dL / Pro: 6.1 g/dL / ALK PHOS: 103 U/L / ALT: 9 U/L / AST: 16 U/L / GGT: x             Culture - Surgical Swab (collected 04-23-19 @ 13:59)  Source: .Surgical Swab None  Preliminary Report (04-24-19 @ 23:14):    Few Gram Negative Rods    Rare Staphylococcus species          RADIOLOGY & ADDITIONAL TESTS:    ANTIBIOTICS:  piperacillin/tazobactam IVPB.   25 mL/Hr IV Intermittent (04-25-19 @ 06:06)   25 mL/Hr IV Intermittent (04-24-19 @ 21:24)   25 mL/Hr IV Intermittent (04-24-19 @ 13:25)   25 mL/Hr IV Intermittent (04-24-19 @ 05:15)   25 mL/Hr IV Intermittent (04-23-19 @ 21:03)   25 mL/Hr IV Intermittent (04-23-19 @ 14:55)    piperacillin/tazobactam IVPB.   200 mL/Hr IV Intermittent (04-22-19 @ 01:28)    piperacillin/tazobactam IVPB.   25 mL/Hr IV Intermittent (04-23-19 @ 05:07)   25 mL/Hr IV Intermittent (04-22-19 @ 21:40)   25 mL/Hr IV Intermittent (04-22-19 @ 13:10)   25 mL/Hr IV Intermittent (04-22-19 @ 05:24)        piperacillin/tazobactam IVPB. 3.375 Gram(s) IV Intermittent every 8 hours

## 2019-04-25 NOTE — PROGRESS NOTE ADULT - ASSESSMENT
84 y/o with significant PMH of PVD presenting for Necrotic LLE wound in the setting of chronic venous stasis ulcers taken to OR for surgical debriedment today  Xray no OM  Culture - Surgical Swab (collected 04-23-19 @ 13:59)    Few Gram Negative Rods    Rare Staphylococcus species  BCx NGTD    RECOMMENDATIONS:  - f/u OR cultures  - Continue with Zosyn 3.375g q8hr at this time

## 2019-04-26 LAB
ALBUMIN SERPL ELPH-MCNC: 3.1 G/DL — LOW (ref 3.5–5.2)
ALP SERPL-CCNC: 96 U/L — SIGNIFICANT CHANGE UP (ref 30–115)
ALT FLD-CCNC: 9 U/L — SIGNIFICANT CHANGE UP (ref 0–41)
ANION GAP SERPL CALC-SCNC: 12 MMOL/L — SIGNIFICANT CHANGE UP (ref 7–14)
AST SERPL-CCNC: 14 U/L — SIGNIFICANT CHANGE UP (ref 0–41)
BASOPHILS # BLD AUTO: 0.02 K/UL — SIGNIFICANT CHANGE UP (ref 0–0.2)
BASOPHILS NFR BLD AUTO: 0.4 % — SIGNIFICANT CHANGE UP (ref 0–1)
BILIRUB SERPL-MCNC: 0.4 MG/DL — SIGNIFICANT CHANGE UP (ref 0.2–1.2)
BUN SERPL-MCNC: 12 MG/DL — SIGNIFICANT CHANGE UP (ref 10–20)
CALCIUM SERPL-MCNC: 8.6 MG/DL — SIGNIFICANT CHANGE UP (ref 8.5–10.1)
CHLORIDE SERPL-SCNC: 103 MMOL/L — SIGNIFICANT CHANGE UP (ref 98–110)
CO2 SERPL-SCNC: 26 MMOL/L — SIGNIFICANT CHANGE UP (ref 17–32)
CREAT SERPL-MCNC: 0.7 MG/DL — SIGNIFICANT CHANGE UP (ref 0.7–1.5)
EOSINOPHIL # BLD AUTO: 0.45 K/UL — SIGNIFICANT CHANGE UP (ref 0–0.7)
EOSINOPHIL NFR BLD AUTO: 9.1 % — HIGH (ref 0–8)
GLUCOSE SERPL-MCNC: 100 MG/DL — HIGH (ref 70–99)
HCT VFR BLD CALC: 33.9 % — LOW (ref 37–47)
HGB BLD-MCNC: 10.8 G/DL — LOW (ref 12–16)
IMM GRANULOCYTES NFR BLD AUTO: 0.4 % — HIGH (ref 0.1–0.3)
LYMPHOCYTES # BLD AUTO: 0.46 K/UL — LOW (ref 1.2–3.4)
LYMPHOCYTES # BLD AUTO: 9.3 % — LOW (ref 20.5–51.1)
MCHC RBC-ENTMCNC: 29.8 PG — SIGNIFICANT CHANGE UP (ref 27–31)
MCHC RBC-ENTMCNC: 31.9 G/DL — LOW (ref 32–37)
MCV RBC AUTO: 93.4 FL — SIGNIFICANT CHANGE UP (ref 81–99)
MONOCYTES # BLD AUTO: 0.49 K/UL — SIGNIFICANT CHANGE UP (ref 0.1–0.6)
MONOCYTES NFR BLD AUTO: 9.9 % — HIGH (ref 1.7–9.3)
NEUTROPHILS # BLD AUTO: 3.51 K/UL — SIGNIFICANT CHANGE UP (ref 1.4–6.5)
NEUTROPHILS NFR BLD AUTO: 70.9 % — SIGNIFICANT CHANGE UP (ref 42.2–75.2)
NRBC # BLD: 0 /100 WBCS — SIGNIFICANT CHANGE UP (ref 0–0)
PLATELET # BLD AUTO: 257 K/UL — SIGNIFICANT CHANGE UP (ref 130–400)
POTASSIUM SERPL-MCNC: 4.2 MMOL/L — SIGNIFICANT CHANGE UP (ref 3.5–5)
POTASSIUM SERPL-SCNC: 4.2 MMOL/L — SIGNIFICANT CHANGE UP (ref 3.5–5)
PROT SERPL-MCNC: 5.4 G/DL — LOW (ref 6–8)
RBC # BLD: 3.63 M/UL — LOW (ref 4.2–5.4)
RBC # FLD: 14.5 % — SIGNIFICANT CHANGE UP (ref 11.5–14.5)
SODIUM SERPL-SCNC: 141 MMOL/L — SIGNIFICANT CHANGE UP (ref 135–146)
WBC # BLD: 4.95 K/UL — SIGNIFICANT CHANGE UP (ref 4.8–10.8)
WBC # FLD AUTO: 4.95 K/UL — SIGNIFICANT CHANGE UP (ref 4.8–10.8)

## 2019-04-26 RX ADMIN — OLANZAPINE 2.5 MILLIGRAM(S): 15 TABLET, FILM COATED ORAL at 17:27

## 2019-04-26 RX ADMIN — OLANZAPINE 2.5 MILLIGRAM(S): 15 TABLET, FILM COATED ORAL at 05:50

## 2019-04-26 RX ADMIN — OXYCODONE AND ACETAMINOPHEN 1 TABLET(S): 5; 325 TABLET ORAL at 09:21

## 2019-04-26 RX ADMIN — PIPERACILLIN AND TAZOBACTAM 25 GRAM(S): 4; .5 INJECTION, POWDER, LYOPHILIZED, FOR SOLUTION INTRAVENOUS at 21:26

## 2019-04-26 RX ADMIN — GABAPENTIN 300 MILLIGRAM(S): 400 CAPSULE ORAL at 21:31

## 2019-04-26 RX ADMIN — OXYCODONE AND ACETAMINOPHEN 1 TABLET(S): 5; 325 TABLET ORAL at 09:51

## 2019-04-26 RX ADMIN — PIPERACILLIN AND TAZOBACTAM 25 GRAM(S): 4; .5 INJECTION, POWDER, LYOPHILIZED, FOR SOLUTION INTRAVENOUS at 13:29

## 2019-04-26 RX ADMIN — SENNA PLUS 2 TABLET(S): 8.6 TABLET ORAL at 21:31

## 2019-04-26 RX ADMIN — ENOXAPARIN SODIUM 40 MILLIGRAM(S): 100 INJECTION SUBCUTANEOUS at 12:06

## 2019-04-26 RX ADMIN — SIMVASTATIN 20 MILLIGRAM(S): 20 TABLET, FILM COATED ORAL at 21:31

## 2019-04-26 RX ADMIN — Medication 81 MILLIGRAM(S): at 12:06

## 2019-04-26 RX ADMIN — LOSARTAN POTASSIUM 100 MILLIGRAM(S): 100 TABLET, FILM COATED ORAL at 05:50

## 2019-04-26 RX ADMIN — PIPERACILLIN AND TAZOBACTAM 25 GRAM(S): 4; .5 INJECTION, POWDER, LYOPHILIZED, FOR SOLUTION INTRAVENOUS at 05:50

## 2019-04-26 NOTE — PROGRESS NOTE ADULT - ASSESSMENT
86 y/o with significant PMH of PVD presenting for Necrotic LLE wound in the setting of chronic venous stasis ulcers taken to OR 4/23 "venous stasis ulcer with adherent necrotic tissue left lower leg--> excision to and including fascia"  Xray no OM  BCx NGTD  Culture - Surgical Swab (collected 04-23-19 @ 13:59)    Few Pseudomonas aeruginosa    Rare Coag Negative Staphylococcus    Moderate Corynebacterium species "Susceptibilities not performed"    Rare Streptococcus agalactiae (Group B) isolated    RECOMMENDATIONS:  - Continue zosyn while in-house on d/c change to PO levaquin 500mg q24h (qtc 424) and flagyl 500mg PO BID to complete 14 days post OR (end 5/6)

## 2019-04-26 NOTE — PROGRESS NOTE ADULT - ATTENDING COMMENTS
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    # Infected Venous stasis ulcer with adherent necrotic tissue left lower leg-->   - s/p Debridement of wound, excision to and including fascia  - burn recommends wound vac on d/c   - c/w  wound care  - ID consult appreciated: - f/u OR cultures - - Continue zosyn while in-house on d/c change to PO levaquin 500mg q24h (qtc 424) and flagyl 500mg PO BID to complete 14 days post OR (end 5/6)   - ESR: 35; CRP: 2.36      # HTN  - losartan 100mg daily  - monitor vitals    # PVD  - follows up with  outpatient  - vascular consult appreciated: Pt has chronic ulceration of the leg. there is no indication for intervention at this time. Continue local wound care. F/u as outpatient.      # DLD  - c/w simvastatin daily    Activity: ambulate as tolerated  diet: dash  dvt ppx: lovenox 40mg daily  gi ppx: not indicated  full code    #HANDOFF:   MAY BE D/JOSE TO SNF OVER THE WEEKEND. BURN WILL CHANGE THE DRESSING OVE THE WEEKEND PRIOR TO D/C

## 2019-04-26 NOTE — PROGRESS NOTE ADULT - ASSESSMENT
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    # Venous stasis ulcer with adherent necrotic tissue left lower leg--> excision to and including fascia  - s/p Debridement of wound, excision to and including fascia  - burn recommends wound vac on d/c   - c/w  wound care  - ID consult appreciated: - f/u OR cultures - - Continue zosyn while in-house on d/c change to PO levaquin 500mg q24h (qtc 424) and flagyl 500mg PO BID to complete 14 days post OR (end 5/6)   - ESR: 35; CRP: 2.36  - Physiatry consult appreciated:    [  x    ]  Home with Outpatient or VN services    # HTN  - losartan 100mg daily  - monitor vitals    # PVD  - follows up with  outpatient  - vascular consult appreciated: Pt has chronic ulceration of the leg. there is no indication for intervention at this time. Continue local wound care. F/u as outpatient.      # DLD  - c/w simvastatin daily    Activity: ambulate as tolerated  diet: dash  dvt ppx: lovenox 40mg daily  gi ppx: not indicated  full code  dispo: pending

## 2019-04-26 NOTE — PROGRESS NOTE ADULT - SUBJECTIVE AND OBJECTIVE BOX
SUBJECTIVE:    Patient is a 85y old Female who presents with a chief complaint of Leg wound (25 Apr 2019 16:20)    Currently admitted to medicine with the primary diagnosis of Skin ulcer of left calf, with unspecified severity     Today is hospital day 4d. Patient reports continued pain on ambulation when participating with physical therapy.  Patient aware of pending placement and need for wound vac on d/c. Does not offer any other complaints.     PAST MEDICAL & SURGICAL HISTORY  PVD (peripheral vascular disease)  Skin ulcer of left ankle, limited to breakdown of skin  Leg swelling  HTN (hypertension)    SOCIAL HISTORY:  Negative for smoking/alcohol/drug use.     ALLERGIES:  vancomycin (Flushing)    MEDICATIONS:  STANDING MEDICATIONS  aspirin enteric coated 81 milliGRAM(s) Oral daily  chlorhexidine 4% Liquid 1 Application(s) Topical <User Schedule>  enoxaparin Injectable 40 milliGRAM(s) SubCutaneous daily  gabapentin 300 milliGRAM(s) Oral at bedtime  losartan 100 milliGRAM(s) Oral daily  OLANZapine 2.5 milliGRAM(s) Oral two times a day  piperacillin/tazobactam IVPB. 3.375 Gram(s) IV Intermittent every 8 hours  senna 2 Tablet(s) Oral at bedtime  simvastatin 20 milliGRAM(s) Oral at bedtime    PRN MEDICATIONS  acetaminophen   Tablet .. 650 milliGRAM(s) Oral every 6 hours PRN  oxyCODONE    5 mG/acetaminophen 325 mG 1 Tablet(s) Oral every 6 hours PRN    VITALS:   T(F): 97  HR: 65  BP: 111/58  RR: 14  SpO2: --    LABS:                        10.8   4.95  )-----------( 257      ( 26 Apr 2019 08:15 )             33.9     04-26    141  |  103  |  12  ----------------------------<  100<H>  4.2   |  26  |  0.7    Ca    8.6      26 Apr 2019 08:15    TPro  5.4<L>  /  Alb  3.1<L>  /  TBili  0.4  /  DBili  x   /  AST  14  /  ALT  9   /  AlkPhos  96  04-26              Culture - Surgical Swab (collected 23 Apr 2019 13:59)  Source: .Surgical Swab None  Preliminary Report (25 Apr 2019 21:10):    Few Pseudomonas aeruginosa    Rare Coag Negative Staphylococcus    Moderate Corynebacterium species "Susceptibilities not performed"    Rare Streptococcus agalactiae (Group B) isolated    Group B streptococci are susceptible to ampicillin,    penicillin and cefazolin, but may be resistant to    erythromycin and clindamycin.    Recommendations for intrapartum prophylaxis for Group B    streptococci are penicillin or ampicillin.  Organism: Pseudomonas aeruginosa  Coag Negative Staphylococcus (25 Apr 2019 20:55)  Organism: Coag Negative Staphylococcus (25 Apr 2019 20:55)  Organism: Pseudomonas aeruginosa (25 Apr 2019 20:53)          RADIOLOGY:    PHYSICAL EXAM:  GEN: No acute distress  LUNGS: Clear to auscultation bilaterally   HEART: S1/S2 present. RRR.   ABD: Soft, non-tender, non-distended. Bowel sounds present  EXT: NC/NC/NE/2+PP/WU/Skin Intact.   NEURO: AAOX3    Intravenous access:   NG tube:   Saha cathter:

## 2019-04-26 NOTE — PROGRESS NOTE ADULT - SUBJECTIVE AND OBJECTIVE BOX
KALI ANDERS  85y, Female      OVERNIGHT EVENTS:  Culture - Surgical Swab (collected 04-23-19 @ 13:59)    Few Pseudomonas aeruginosa    Rare Coag Negative Staphylococcus    Moderate Corynebacterium species "Susceptibilities not performed"    Rare Streptococcus agalactiae (Group B) isolated    ROS negative except as per above    VITALS:  T(F): 97, Max: 99.1 (04-25-19 @ 12:32)  HR: 65  BP: 111/58  RR: 14Vital Signs Last 24 Hrs  T(C): 36.1 (26 Apr 2019 12:20), Max: 37.3 (25 Apr 2019 12:32)  T(F): 97 (26 Apr 2019 12:20), Max: 99.1 (25 Apr 2019 12:32)  HR: 65 (26 Apr 2019 12:20) (58 - 78)  BP: 111/58 (26 Apr 2019 12:20) (111/58 - 142/73)  BP(mean): --  RR: 14 (26 Apr 2019 12:20) (14 - 18)  SpO2: --      PHYSICAL EXAM:  Gen: Awake and alert, non-toxic appearing, NAD  HEENT: NCAT.   CV: RRR  Lungs: CTAB  Abd: Soft. NTND  Extr: wwp, dressings b/l wound vac  Skin: no rash  Neuro: No focal deficits  Lines: clean    TESTS & MEASUREMENTS:                        10.8   4.95  )-----------( 257      ( 26 Apr 2019 08:15 )             33.9     04-26    141  |  103  |  12  ----------------------------<  100<H>  4.2   |  26  |  0.7    Ca    8.6      26 Apr 2019 08:15    TPro  5.4<L>  /  Alb  3.1<L>  /  TBili  0.4  /  DBili  x   /  AST  14  /  ALT  9   /  AlkPhos  96  04-26    LIVER FUNCTIONS - ( 26 Apr 2019 08:15 )  Alb: 3.1 g/dL / Pro: 5.4 g/dL / ALK PHOS: 96 U/L / ALT: 9 U/L / AST: 14 U/L / GGT: x             Culture - Surgical Swab (collected 04-23-19 @ 13:59)  Source: .Surgical Swab None  Preliminary Report (04-25-19 @ 21:10):    Few Pseudomonas aeruginosa    Rare Coag Negative Staphylococcus    Moderate Corynebacterium species "Susceptibilities not performed"    Rare Streptococcus agalactiae (Group B) isolated    Group B streptococci are susceptible to ampicillin,    penicillin and cefazolin, but may be resistant to    erythromycin and clindamycin.    Recommendations for intrapartum prophylaxis for Group B    streptococci are penicillin or ampicillin.  Organism: Pseudomonas aeruginosa  Coag Negative Staphylococcus (04-25-19 @ 20:55)  Organism: Coag Negative Staphylococcus (04-25-19 @ 20:55)      -  Ampicillin/Sulbactam: R <=8/4      -  Cefazolin: R <=4      -  Clindamycin: R >4      -  Erythromycin: R >4      -  Gentamicin: R >8 Should not be used as monotherapy      -  Oxacillin: R >2      -  Penicillin: R >8      -  RIF- Rifampin: S <=1 Should not be used as monotherapy      -  Tetra/Doxy: R >8      -  Trimethoprim/Sulfamethoxazole: R >2/38      -  Vancomycin: S 2      Method Type: STEPHENIE  Organism: Pseudomonas aeruginosa (04-25-19 @ 20:53)      -  Amikacin: S <=16      -  Aztreonam: S <=4      -  Cefepime: S <=4      -  Ceftazidime: S 4      -  Ciprofloxacin: S <=1      -  Gentamicin: S <=4      -  Imipenem: S <=1      -  Levofloxacin: S <=2      -  Meropenem: S <=1      -  Piperacillin/Tazobactam: S <=16      -  Tobramycin: S <=4      Method Type: STEPHENIE          RADIOLOGY & ADDITIONAL TESTS:    ANTIBIOTICS:  piperacillin/tazobactam IVPB.   25 mL/Hr IV Intermittent (04-26-19 @ 05:50)   25 mL/Hr IV Intermittent (04-25-19 @ 21:51)   25 mL/Hr IV Intermittent (04-25-19 @ 13:46)   25 mL/Hr IV Intermittent (04-25-19 @ 06:06)   25 mL/Hr IV Intermittent (04-24-19 @ 21:24)   25 mL/Hr IV Intermittent (04-24-19 @ 13:25)   25 mL/Hr IV Intermittent (04-24-19 @ 05:15)   25 mL/Hr IV Intermittent (04-23-19 @ 21:03)   25 mL/Hr IV Intermittent (04-23-19 @ 14:55)    piperacillin/tazobactam IVPB.   200 mL/Hr IV Intermittent (04-22-19 @ 01:28)    piperacillin/tazobactam IVPB.   25 mL/Hr IV Intermittent (04-23-19 @ 05:07)   25 mL/Hr IV Intermittent (04-22-19 @ 21:40)   25 mL/Hr IV Intermittent (04-22-19 @ 13:10)   25 mL/Hr IV Intermittent (04-22-19 @ 05:24)        piperacillin/tazobactam IVPB. 3.375 Gram(s) IV Intermittent every 8 hours

## 2019-04-27 LAB
ALBUMIN SERPL ELPH-MCNC: 3.2 G/DL — LOW (ref 3.5–5.2)
ALP SERPL-CCNC: 96 U/L — SIGNIFICANT CHANGE UP (ref 30–115)
ALT FLD-CCNC: 8 U/L — SIGNIFICANT CHANGE UP (ref 0–41)
ANION GAP SERPL CALC-SCNC: 10 MMOL/L — SIGNIFICANT CHANGE UP (ref 7–14)
AST SERPL-CCNC: 12 U/L — SIGNIFICANT CHANGE UP (ref 0–41)
BILIRUB SERPL-MCNC: 0.4 MG/DL — SIGNIFICANT CHANGE UP (ref 0.2–1.2)
BUN SERPL-MCNC: 16 MG/DL — SIGNIFICANT CHANGE UP (ref 10–20)
CALCIUM SERPL-MCNC: 8.5 MG/DL — SIGNIFICANT CHANGE UP (ref 8.5–10.1)
CHLORIDE SERPL-SCNC: 105 MMOL/L — SIGNIFICANT CHANGE UP (ref 98–110)
CO2 SERPL-SCNC: 26 MMOL/L — SIGNIFICANT CHANGE UP (ref 17–32)
CREAT SERPL-MCNC: 0.7 MG/DL — SIGNIFICANT CHANGE UP (ref 0.7–1.5)
GLUCOSE SERPL-MCNC: 102 MG/DL — HIGH (ref 70–99)
HCT VFR BLD CALC: 34 % — LOW (ref 37–47)
HGB BLD-MCNC: 10.7 G/DL — LOW (ref 12–16)
MCHC RBC-ENTMCNC: 29.9 PG — SIGNIFICANT CHANGE UP (ref 27–31)
MCHC RBC-ENTMCNC: 31.5 G/DL — LOW (ref 32–37)
MCV RBC AUTO: 95 FL — SIGNIFICANT CHANGE UP (ref 81–99)
NRBC # BLD: 0 /100 WBCS — SIGNIFICANT CHANGE UP (ref 0–0)
PLATELET # BLD AUTO: 264 K/UL — SIGNIFICANT CHANGE UP (ref 130–400)
POTASSIUM SERPL-MCNC: 4.1 MMOL/L — SIGNIFICANT CHANGE UP (ref 3.5–5)
POTASSIUM SERPL-SCNC: 4.1 MMOL/L — SIGNIFICANT CHANGE UP (ref 3.5–5)
PROT SERPL-MCNC: 5.5 G/DL — LOW (ref 6–8)
RBC # BLD: 3.58 M/UL — LOW (ref 4.2–5.4)
RBC # FLD: 14.3 % — SIGNIFICANT CHANGE UP (ref 11.5–14.5)
SODIUM SERPL-SCNC: 141 MMOL/L — SIGNIFICANT CHANGE UP (ref 135–146)
WBC # BLD: 3.98 K/UL — LOW (ref 4.8–10.8)
WBC # FLD AUTO: 3.98 K/UL — LOW (ref 4.8–10.8)

## 2019-04-27 RX ORDER — MORPHINE SULFATE 50 MG/1
2 CAPSULE, EXTENDED RELEASE ORAL ONCE
Qty: 0 | Refills: 0 | Status: DISCONTINUED | OUTPATIENT
Start: 2019-04-27 | End: 2019-04-27

## 2019-04-27 RX ORDER — MORPHINE SULFATE 50 MG/1
4 CAPSULE, EXTENDED RELEASE ORAL ONCE
Qty: 0 | Refills: 0 | Status: DISCONTINUED | OUTPATIENT
Start: 2019-04-27 | End: 2019-04-27

## 2019-04-27 RX ADMIN — OLANZAPINE 2.5 MILLIGRAM(S): 15 TABLET, FILM COATED ORAL at 05:40

## 2019-04-27 RX ADMIN — LOSARTAN POTASSIUM 100 MILLIGRAM(S): 100 TABLET, FILM COATED ORAL at 05:40

## 2019-04-27 RX ADMIN — PIPERACILLIN AND TAZOBACTAM 25 GRAM(S): 4; .5 INJECTION, POWDER, LYOPHILIZED, FOR SOLUTION INTRAVENOUS at 21:27

## 2019-04-27 RX ADMIN — SIMVASTATIN 20 MILLIGRAM(S): 20 TABLET, FILM COATED ORAL at 21:28

## 2019-04-27 RX ADMIN — ENOXAPARIN SODIUM 40 MILLIGRAM(S): 100 INJECTION SUBCUTANEOUS at 11:30

## 2019-04-27 RX ADMIN — GABAPENTIN 300 MILLIGRAM(S): 400 CAPSULE ORAL at 21:25

## 2019-04-27 RX ADMIN — PIPERACILLIN AND TAZOBACTAM 25 GRAM(S): 4; .5 INJECTION, POWDER, LYOPHILIZED, FOR SOLUTION INTRAVENOUS at 05:39

## 2019-04-27 RX ADMIN — OXYCODONE AND ACETAMINOPHEN 1 TABLET(S): 5; 325 TABLET ORAL at 17:36

## 2019-04-27 RX ADMIN — SENNA PLUS 2 TABLET(S): 8.6 TABLET ORAL at 21:26

## 2019-04-27 RX ADMIN — OLANZAPINE 2.5 MILLIGRAM(S): 15 TABLET, FILM COATED ORAL at 17:34

## 2019-04-27 RX ADMIN — Medication 81 MILLIGRAM(S): at 11:30

## 2019-04-27 RX ADMIN — OXYCODONE AND ACETAMINOPHEN 1 TABLET(S): 5; 325 TABLET ORAL at 18:06

## 2019-04-27 RX ADMIN — PIPERACILLIN AND TAZOBACTAM 25 GRAM(S): 4; .5 INJECTION, POWDER, LYOPHILIZED, FOR SOLUTION INTRAVENOUS at 13:18

## 2019-04-27 NOTE — PROGRESS NOTE ADULT - I WAS PHYSICALLY PRESENT FOR THE KEY PORTIONS OF THE EVALUATION AND MANAGEMENT (E/M) SERVICE PROVIDED.  I AGREE WITH THE ABOVE HISTORY, PHYSICAL, AND PLAN WHICH I HAVE REVIEWED AND EDITED WHERE APPROPRIATE
At [facility], one important tool we use to improve our patient services is our Patient Survey.  Following your visit you may receive our survey in the mail.    Please take the time to complete the survey.    If your visit with us was great, we want to hear about it.    If we can improve, please let us know how.       PRE-TEEN/EARLY TEEN HANDOUT    Well-Child Checkup: 11-13 Years  Between ages 11 and 13, Albert will grow and change a lot. It’s important to keep having yearly checkups so the healthcare provider can track this progress. As Albert enters puberty, he may become more embarrassed about having a checkup. Reassure Albert that the exam is normal and necessary. Also be aware that the healthcare provider may ask to talk with the child without you in the exam room.      School and Social Issues  Here are some topics you, Albert, and the healthcare provider may want to discuss during this visit:  School performance. How is Albert doing in school? Is homework finished on time? Does Albert stay organized? These are skills you can help with. Keep in mind that a drop in school performance can be a sign of other problems.  Friendships. Do you like Albert’s friends? Do the friendships seem healthy? Make sure to talk to Albert about who his friends are and how they spend time together. This is the age when peer pressure can start to be a problem.  Life at home. How is Albert’s behavior? Does he get along with others in the family? Is he respectful of you, other adults, and authority? Does Albert participate in family events, or does he withdraw from other family members?  Risky behaviors. It’s not too early to start talking to Albert about drugs, alcohol, smoking, and sex. Make sure Albert understands that these are not activities he should do, even if friends are. Answer Albert’s questions, and don’t be afraid to ask questions of your own. Make sure Albert knows he can always come to you for 
help. If you’re not sure how to approach these topics, talk to the healthcare provider for advice.    Entering Puberty  Puberty is the stage when a child begins to develop sexually into an adult. It usually starts between 9 and 14 for girls, and between 12 and 16 for boys. Here is some of what you can expect when puberty begins:  Acne and body odor. Hormones that increase during puberty can cause acne (pimples) on the face and body. Hormones can also increase sweating and cause a stronger body odor. At this age, Albert should begin to shower or bathe daily. Encourage Albert to use deodorant and acne products as needed.  Body changes in boys. At the start of puberty, the testicles drop lower and the scrotum darkens and becomes looser. Hair begins to grow in the pubic area, under the arms, and on the legs, chest, and face. The voice changes, becoming lower and deeper. As the penis grows and matures, erections and “wet dreams” begin to occur. Reassure your son that this is normal.  Emotional changes. Along with these physical changes, you will likely notice changes in Albert’s personality. You may notice Albert developing an interest in dating and becoming “more than friends” with others. Also, many kids become avila and develop an attitude around puberty. This can be frustrating, but it is very normal. Try to be patient and consistent. Encourage conversations, even when Albert doesn’t seem to want to talk. No matter how Albert acts, he still needs a parent.    Nutrition and Exercise Tips  Physical activity is key to lifelong good health. Encourage Albert to find activities that he enjoys. Today, kids are less active and eat more junk food than ever before. Albert is starting to make choices about what to eat and how active to be. You can’t always have the final say, but you can help Albert develop healthy habits. Here are some tips:  Help Albert get at least 30-60 minutes of activity every day. The time can 
be broken up throughout the day. If the weather is bad or you’re worried about safety, find supervised indoor activities.   Limit “screen time” to 1-2 hours each day. This includes time spent watching TV, playing video games, using the computer, and texting. If Albert has a TV, computer, or video game console in the bedroom, consider replacing it with a music player. For many kids, dancing and singing are fun ways to get moving.  Limit sugary drinks. Soda, juice, and sports drinks lead to unhealthy weight gain and tooth decay. Water and low-fat or nonfat milk are best to drink. In moderation, 100% fruit juice is okay. Save soda and other sugary drinks for special occasions.  Have at least one family meal together each day. Busy schedules often limit time for sitting and talking. Sitting and eating together allows for family time. It also lets you see what and how Albert eats.  Pay attention to portions. Serve portions that make sense for your kids. Let them stop eating when they are full--don’t make them clean their plates. Also be aware that many kids’ appetites increase during puberty. If Albert is still hungry after a meal, offer seconds of vegetables or fruit.  Serve and encourage healthy foods. Albert is making more food decisions on his or her own. All foods have a place in a balanced diet. Fruits, vegetables, lean meats, and whole grains should be eaten every day. Save less healthy foods--like french fries, candy, and chips--for a special occasion. When Albert does choose to eat junk food, consider making the child buy it with his or her own money.  Bring Albert to the dentist at least twice a year for teeth cleaning and a checkup.    Sleeping Tips  At this age, Albert needs about 10 hours of sleep each night. Here are some tips:  Set a bedtime and make sure Albert follows it each night.  TV, computer, and video games can agitate a child and make it hard to calm down for the night. Turn them off at 
least an hour before bed. Instead, encourage Albert to read before bed.  If Albert has a cell phone, make sure it’s turned off at night.  Don’t let Albert go to sleep very late or sleep in on weekends. This can disrupt sleep patterns and make it harder to sleep on school nights.  Remind Albert to brush and floss his or her teeth before bed.    Safety Tips  When riding a bike, roller-skating, or using a scooter or skateboard, Albert should wear a helmet with the strap fastened. When using roller skates, a scooter, or a skateboard, it is also a good idea for Albert to wear wrist guards, elbow pads, and knee pads.  In the car, all children younger than 13 should sit in the back seat.  If Albert has a cell phone or portable music player, make sure these are used safely and responsibly. Do not allow Albert to talk on the phone, text, or listen to music with headphones while he is riding a bike or walking outdoors. Remind Albert to pay special attention when crossing the street.  Constant loud music can cause hearing damage, so monitor the volume on Albert’s music player. Many players let you set a limit for how loud the volume can be turned up. Check the directions for details.  At this age, kids may start taking risks that could be dangerous to their health or well-being. Sometimes bad decisions stem from peer pressure. Other times, kids just don’t think ahead about what could happen. Teach Albert the importance of making good decisions. Talk about how to recognize peer pressure and come up with strategies for coping with it.  Sudden changes in Albert’s mood, behavior, friendships, or activities can be warning signs of problems at school or in other aspects of Albert’s life. If you notice signs like these, talk to Albert and to the staff at Albert’s school. The healthcare provider may also be able to offer advice.    Stay On Top of Social Media  In this wired age, kids are much more “connected” with 
Statement Selected
Statement Selected
friends--possibly some they have never met in person. To teach Alebrt how to use social media responsibly:  Set limits for the use of cell phones, the computer, and the Internet. Remind Albert that you can check the web browser history and cell phone logs to know how these devices are being used. Use parental controls and passwords to block access to inappropriate websites. Use privacy settings on websites so only Albert’s friends can view his or her profile.  Explain to Albert the dangers of giving out personal information online. Teach Albert not to share his or her phone number, address, picture, or other personal details with online friends without your permission.  Make sure Albert understands that things he “says” on the Internet are never private. Posts made on websites like [employer], [employer], and [employer] can be seen by people they weren’t intended for. Posts can easily be misunderstood and can even cause trouble for you or Albert. Supervise Albert’s use of social networks, chat rooms, and email.    How to Prevent Tooth Decay in your Baby  Taking good care of your child's teeth must start even before the first tooth appears.  You can prevent tooth decay in your child by following healthy dental habits.  Your pediatrician or pediatric dentist may also call a child's tooth decay early childhood caries.  In the past, it was called baby bottle tooth decay.    Tooth decay can develop as soon as the first tooth erupts, but brushing every day may not be enough to prevent it.  If tooth decay is not prevented, it can be costly to treat.  If left untreated, it can destroy the teeth. This can cause infection, early loss of baby teeth, crooked adult teeth, and decay in adult teeth. Read on to find out how you can help prevent tooth decay from developing in your child.      What causes tooth decay?  Tooth decay develops when a child's teeth and gums are exposed to any liquids or foods other than water for long 
Statement Selected
Statement Selected
periods. The liquid or food collects around the teeth, and the natural or added sugars are changed to acid by bacteria in the mouth. This acid then dissolves the outer part of the teeth, causing them to decay.      The most common way this happens is when parents put their children to bed with a bottle of formula, milk, juice, soft drinks, sugar water, or sugared drinks. It can also occur when children are allowed to drink from a sippy cup, suck on a bottle, or breastfeeding for long periods during the day or night.      Signs of tooth decay:  Tooth decay first appears as white spots at the gum line on the upper front teeth. These spots are hard to see at first -- even for a pediatrician or dentist -- without proper equipment. A child with tooth decay needs to be examined and treated early to stop the decay from spreading and to prevent further damage.      How to prevent tooth decay:  Take the following steps to prevent tooth decay:   · Never put your child to bed with a bottle or food.  Not only does this expose your child's teeth to sugars, it can also put your child at risk for ear infections and choking.   · Only give your child a bottle during meals.  Do not use a bottle or sippy cup as a pacifier or let your child walk around with or drink from them for long periods.     · Teach your child to drink from a cup as soon as possible.  Drinking from a cup is less likely to cause the liquid to collect around the teeth. Also, a cup cannot be taken to bed.    · If your child must have a bottle or sippy cup for long periods, fill it only with water.  During car rides, offer only water if your child is thirsty.      How to clean your child's teeth:  Keep your baby's mouth clean by gently brushing the gums and teeth with water and a soft infant toothbrush or gauze. Once your baby has 8 teeth, you can start using a child-sized toothbrush for daily cleanings.      Brush your child's teeth 2 times a day. The best times to 
brush are after breakfast and before bed. Start by using a fluoride-free toothpaste. When your child is able to spit and not swallow the toothpaste (usually around 2 to 3 years old), you should continue brushing his or her teeth using a pea-sized amount of toothpaste containing fluoride.  Swallowing too much toothpaste with fluoride in it can cause white and brown spots on your child's developing adult teeth. Most children are not able to brush their teeth by themselves until they are 6 to 8 years old, when their hand coordination and understanding of thorough plaque removal are better.      TESTING YOUR WATER FOR FLUORIDE    Most water has some fluoride in it but the amount varies. Well water derives it fluoride from the rock it flows through. Depending on the depth of the well, water may have fluoride that is low, just right, or in rare cases, too high. The fact that your neighbor's well is normal has no relationship to the amount of fluoride in your own well.     Teeth are strongest in areas where the fluoride concentration is between 6/10 parts per million and 2 parts per million.    Children need fluoride especially when they are between 6 months and 14 years. This is the time when they are enamelling their permanent teeth.          You can test you water easily, call:    [facility]  [phone]  [address] [place] [address]  [id number]    The test kit may be prepaid by personal check or paid by MasterCard or Visa.      Once a sample is submitted, you will receive a complete report on your water's fluoride level. Your pediatrician will then be able to advise you whether or not additional fluoride is required. When you call, please have 1) the test results, 2) the ages of your children, 3) your preference for liquid, chewable, or tablet form.    Most municipal water supplies in the [facility] are fluoridated but if you have a reverse osmosis filter it may remove the fluoride. Charcoal filters 
do not remove fluoride. If your child drinks no water, they may need supplements even if the water has fluoride.      Remember:  Tooth decay can be prevented. Talk with your pediatrician or pediatric dentist if you see any signs of decay in your child's teeth or you simply have questions about the teeth. With the right care, your child can grow up to have healthy teeth for a lifetime of smiles.      The American Academy of Pediatrics recommends that all infants receive oral health risk assessments by 6 months of age. Infants at higher risk of early tooth decay should be referred to a dentist as early as 6 months of age and no later than 6 months after the first tooth erupts or 12 months of age (whichever comes first).      All children in their early toddler years should have a complete dental exam by a dentist.

## 2019-04-27 NOTE — PROGRESS NOTE ADULT - ASSESSMENT
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    # Venous stasis ulcer with adherent necrotic tissue left lower leg--> excision to and including fascia  - s/p Debridement of wound, excision to and including fascia  - burn recommends wound vac on d/c   - c/w  wound care  - ID consult appreciated: - f/u OR cultures - - Continue zosyn while in-house on d/c change to PO levaquin 500mg q24h (qtc 424) and flagyl 500mg PO BID to complete 14 days post OR (end 5/6)   - ESR: 35; CRP: 2.36  - Physiatry consult appreciated:    [  x    ]  Home with Outpatient or VN services    # HTN  - losartan 100mg daily  - monitor vitals    # PVD  - follows up with  outpatient  - vascular consult appreciated: Pt has chronic ulceration of the leg. there is no indication for intervention at this time. Continue local wound care. F/u as outpatient.      # DLD  - c/w simvastatin daily    Activity: ambulate as tolerated  diet: dash  dvt ppx: lovenox 40mg daily  gi ppx: not indicated  full code  dispo: pending

## 2019-04-27 NOTE — PROGRESS NOTE ADULT - SUBJECTIVE AND OBJECTIVE BOX
SUBJECTIVE:    Patient is a 85y old Female who presents with a chief complaint of Leg wound (25 Apr 2019 16:20)    Currently admitted to medicine with the primary diagnosis of Skin ulcer of left calf, with unspecified severity     Today is hospital day 5d. Patient reports pain when she bears weight on her LE and on ambulation.  Does not offer any complaints; aware that placement is pending.     PAST MEDICAL & SURGICAL HISTORY  PVD (peripheral vascular disease)  Skin ulcer of left ankle, limited to breakdown of skin  Leg swelling  HTN (hypertension)    SOCIAL HISTORY:  Negative for smoking/alcohol/drug use.     ALLERGIES:  vancomycin ([place])    MEDICATIONS:  STANDING MEDICATIONS  aspirin enteric coated 81 milliGRAM(s) Oral daily  chlorhexidine 4% Liquid 1 Application(s) Topical <User Schedule>  enoxaparin Injectable 40 milliGRAM(s) SubCutaneous daily  gabapentin 300 milliGRAM(s) Oral at bedtime  losartan 100 milliGRAM(s) Oral daily  OLANZapine 2.5 milliGRAM(s) Oral two times a day  piperacillin/tazobactam IVPB. 3.375 Gram(s) IV Intermittent every 8 hours  senna 2 Tablet(s) Oral at bedtime  simvastatin 20 milliGRAM(s) Oral at bedtime    PRN MEDICATIONS  acetaminophen   Tablet .. 650 milliGRAM(s) Oral every 6 hours PRN  oxyCODONE    5 mG/acetaminophen 325 mG 1 Tablet(s) Oral every 6 hours PRN    VITALS:   T(F): 96.9  HR: 61  BP: 146/69  RR: 17  SpO2: --    LABS:                        10.7   3.98  )-----------( 264      ( 27 Apr 2019 07:39 )             34.0     04-27    141  |  105  |  16  ----------------------------<  102<H>  4.1   |  26  |  0.7    Ca    8.5      27 Apr 2019 07:39    TPro  5.5<L>  /  Alb  3.2<L>  /  TBili  0.4  /  DBili  x   /  AST  12  /  ALT  8   /  AlkPhos  96  04-27                  RADIOLOGY:    PHYSICAL EXAM:  GEN: No acute distress  LUNGS: Clear to auscultation bilaterally   HEART: S1/S2 present. RRR.   ABD: Soft, non-tender, non-distended. Bowel sounds present  EXT: dressings dry and intact, w/ wound vac attached/NC/NC/NE/2+PP/WU/Skin Intact.   NEURO: AAOX3      Intravenous access:   NG tube:   Saha cathter:

## 2019-04-27 NOTE — PROGRESS NOTE ADULT - ATTENDING COMMENTS
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    # Infected Venous stasis ulcer with adherent necrotic tissue left lower leg-->   - s/p Debridement of wound, excision to and including fascia  - burn recommends wound vac on d/c   - c/w  wound care  - ID consult appreciated: - f/u OR cultures - - Continue zosyn while in-house on d/c change to PO levaquin 500mg q24h (qtc 424) and flagyl 500mg PO BID to complete 14 days post OR (end 5/6)   - ESR: 35; CRP: 2.36      # HTN  - losartan 100mg daily  - monitor vitals    # PVD  - follows up with  outpatient  - vascular consult appreciated: Pt has chronic ulceration of the leg. there is no indication for intervention at this time. Continue local wound care. F/u as outpatient.      # DLD  - c/w simvastatin daily    Activity: ambulate as tolerated  diet: dash  dvt ppx: lovenox 40mg daily  gi ppx: not indicated  full code    #HANDOFF:   MAY BE D/JOSE TO SNF ON MONDAY. BURN WILL CHANGE THE DRESSING OVER THE WEEKEND.

## 2019-04-28 LAB
ALBUMIN SERPL ELPH-MCNC: 3.4 G/DL — LOW (ref 3.5–5.2)
ALP SERPL-CCNC: 111 U/L — SIGNIFICANT CHANGE UP (ref 30–115)
ALT FLD-CCNC: 9 U/L — SIGNIFICANT CHANGE UP (ref 0–41)
ANION GAP SERPL CALC-SCNC: 11 MMOL/L — SIGNIFICANT CHANGE UP (ref 7–14)
AST SERPL-CCNC: 15 U/L — SIGNIFICANT CHANGE UP (ref 0–41)
BILIRUB SERPL-MCNC: 0.5 MG/DL — SIGNIFICANT CHANGE UP (ref 0.2–1.2)
BUN SERPL-MCNC: 15 MG/DL — SIGNIFICANT CHANGE UP (ref 10–20)
CALCIUM SERPL-MCNC: 8.7 MG/DL — SIGNIFICANT CHANGE UP (ref 8.5–10.1)
CHLORIDE SERPL-SCNC: 104 MMOL/L — SIGNIFICANT CHANGE UP (ref 98–110)
CO2 SERPL-SCNC: 25 MMOL/L — SIGNIFICANT CHANGE UP (ref 17–32)
CREAT SERPL-MCNC: 0.8 MG/DL — SIGNIFICANT CHANGE UP (ref 0.7–1.5)
CULTURE RESULTS: SIGNIFICANT CHANGE UP
GLUCOSE SERPL-MCNC: 98 MG/DL — SIGNIFICANT CHANGE UP (ref 70–99)
HCT VFR BLD CALC: 36.8 % — LOW (ref 37–47)
HGB BLD-MCNC: 11.4 G/DL — LOW (ref 12–16)
MCHC RBC-ENTMCNC: 29.5 PG — SIGNIFICANT CHANGE UP (ref 27–31)
MCHC RBC-ENTMCNC: 31 G/DL — LOW (ref 32–37)
MCV RBC AUTO: 95.3 FL — SIGNIFICANT CHANGE UP (ref 81–99)
NRBC # BLD: 0 /100 WBCS — SIGNIFICANT CHANGE UP (ref 0–0)
ORGANISM # SPEC MICROSCOPIC CNT: SIGNIFICANT CHANGE UP
PLATELET # BLD AUTO: 283 K/UL — SIGNIFICANT CHANGE UP (ref 130–400)
POTASSIUM SERPL-MCNC: 4.2 MMOL/L — SIGNIFICANT CHANGE UP (ref 3.5–5)
POTASSIUM SERPL-SCNC: 4.2 MMOL/L — SIGNIFICANT CHANGE UP (ref 3.5–5)
PROT SERPL-MCNC: 5.8 G/DL — LOW (ref 6–8)
RBC # BLD: 3.86 M/UL — LOW (ref 4.2–5.4)
RBC # FLD: 14.3 % — SIGNIFICANT CHANGE UP (ref 11.5–14.5)
SODIUM SERPL-SCNC: 140 MMOL/L — SIGNIFICANT CHANGE UP (ref 135–146)
SPECIMEN SOURCE: SIGNIFICANT CHANGE UP
WBC # BLD: 4.89 K/UL — SIGNIFICANT CHANGE UP (ref 4.8–10.8)
WBC # FLD AUTO: 4.89 K/UL — SIGNIFICANT CHANGE UP (ref 4.8–10.8)

## 2019-04-28 RX ORDER — COLLAGENASE CLOSTRIDIUM HIST. 250 UNIT/G
1 OINTMENT (GRAM) TOPICAL
Qty: 0 | Refills: 0 | Status: DISCONTINUED | OUTPATIENT
Start: 2019-04-28 | End: 2019-04-29

## 2019-04-28 RX ADMIN — PIPERACILLIN AND TAZOBACTAM 25 GRAM(S): 4; .5 INJECTION, POWDER, LYOPHILIZED, FOR SOLUTION INTRAVENOUS at 05:14

## 2019-04-28 RX ADMIN — SENNA PLUS 2 TABLET(S): 8.6 TABLET ORAL at 21:22

## 2019-04-28 RX ADMIN — OXYCODONE AND ACETAMINOPHEN 1 TABLET(S): 5; 325 TABLET ORAL at 12:46

## 2019-04-28 RX ADMIN — OLANZAPINE 2.5 MILLIGRAM(S): 15 TABLET, FILM COATED ORAL at 05:15

## 2019-04-28 RX ADMIN — OLANZAPINE 2.5 MILLIGRAM(S): 15 TABLET, FILM COATED ORAL at 18:10

## 2019-04-28 RX ADMIN — OXYCODONE AND ACETAMINOPHEN 1 TABLET(S): 5; 325 TABLET ORAL at 18:08

## 2019-04-28 RX ADMIN — GABAPENTIN 300 MILLIGRAM(S): 400 CAPSULE ORAL at 21:22

## 2019-04-28 RX ADMIN — Medication 81 MILLIGRAM(S): at 12:41

## 2019-04-28 RX ADMIN — SIMVASTATIN 20 MILLIGRAM(S): 20 TABLET, FILM COATED ORAL at 21:22

## 2019-04-28 RX ADMIN — ENOXAPARIN SODIUM 40 MILLIGRAM(S): 100 INJECTION SUBCUTANEOUS at 12:41

## 2019-04-28 RX ADMIN — Medication 1 APPLICATION(S): at 18:12

## 2019-04-28 RX ADMIN — LOSARTAN POTASSIUM 100 MILLIGRAM(S): 100 TABLET, FILM COATED ORAL at 05:15

## 2019-04-28 RX ADMIN — PIPERACILLIN AND TAZOBACTAM 25 GRAM(S): 4; .5 INJECTION, POWDER, LYOPHILIZED, FOR SOLUTION INTRAVENOUS at 21:22

## 2019-04-28 RX ADMIN — PIPERACILLIN AND TAZOBACTAM 25 GRAM(S): 4; .5 INJECTION, POWDER, LYOPHILIZED, FOR SOLUTION INTRAVENOUS at 15:14

## 2019-04-28 NOTE — PROGRESS NOTE ADULT - SUBJECTIVE AND OBJECTIVE BOX
S : No new events/complaints      All other pertinent ROS negative.      04-27-19 @ 07:01  -  04-28-19 @ 07:00  --------------------------------------------------------  IN: 440 mL / OUT: 320 mL / NET: 120 mL      Vital Signs Last 24 Hrs  T(C): 36.4 (28 Apr 2019 04:52), Max: 36.5 (27 Apr 2019 21:54)  T(F): 97.6 (28 Apr 2019 04:52), Max: 97.7 (27 Apr 2019 21:54)  HR: 62 (28 Apr 2019 04:52) (62 - 68)  BP: 127/57 (28 Apr 2019 04:52) (127/56 - 127/57)  BP(mean): --  RR: 18 (28 Apr 2019 04:52) (18 - 18)  SpO2: 97% (27 Apr 2019 19:22) (97% - 97%)  PHYSICAL EXAM:    Constitutional: NAD, awake and alert, well-developed  HEENT: PERR, EOMI, Normal Hearing, MMM  Neck: Soft and supple, No LAD, No JVD  Respiratory: Breath sounds are clear bilaterally, No wheezing, rales or rhonchi  Cardiovascular: S1 and S2, regular rate and rhythm, no Murmurs, gallops or rubs  Gastrointestinal: Bowel Sounds present, soft, nontender, nondistended, no guarding, no rebound  Extremities: dressed wounds      MEDICATIONS:  MEDICATIONS  (STANDING):  aspirin enteric coated 81 milliGRAM(s) Oral daily  chlorhexidine 4% Liquid 1 Application(s) Topical <User Schedule>  enoxaparin Injectable 40 milliGRAM(s) SubCutaneous daily  gabapentin 300 milliGRAM(s) Oral at bedtime  losartan 100 milliGRAM(s) Oral daily  OLANZapine 2.5 milliGRAM(s) Oral two times a day  piperacillin/tazobactam IVPB. 3.375 Gram(s) IV Intermittent every 8 hours  senna 2 Tablet(s) Oral at bedtime  simvastatin 20 milliGRAM(s) Oral at bedtime      LABS: All Labs Reviewed:                        11.4   4.89  )-----------( 283      ( 28 Apr 2019 08:00 )             36.8     04-28    140  |  104  |  15  ----------------------------<  98  4.2   |  25  |  0.8    Ca    8.7      28 Apr 2019 08:00    TPro  5.8<L>  /  Alb  3.4<L>  /  TBili  0.5  /  DBili  x   /  AST  15  /  ALT  9   /  AlkPhos  111  04-28          Blood Culture: 04-23 @ 13:59  Organism Pseudomonas aeruginosa  Gram Stain Blood -- Gram Stain --  Specimen Source .Surgical Swab None  Culture-Blood --        Radiology: reviewed

## 2019-04-28 NOTE — PROGRESS NOTE ADULT - ASSESSMENT
left lower leg venous stasis ulcer --> healing post debridement    soap and water qd, santyl ointment and xeroform gauze and kerlex gauze dressing bid, ace wrap for toes to knees    no further surgery needed

## 2019-04-28 NOTE — PROGRESS NOTE ADULT - ASSESSMENT
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    # Infected Venous stasis ulcer with adherent necrotic tissue left lower leg-->   - s/p Debridement of wound, excision to and including fascia  - burn recommends wound vac on d/c   - c/w  wound care per Burn. No more surgery needed. Cleared for D/c  - ID consult appreciated: - f/u OR cultures - - PAn sensitive Pseudomonas & coag negative staph only sensitive to VAnco (may be a colonizer?).   Continue zosyn while in-house on d/c change to PO levaquin 500mg q24h (qtc 424) and flagyl 500mg PO BID to complete 14 days post OR (end 5/6) - confirm this with ID tomorrow.  - ESR: 35; CRP: 2.36      # HTN  - losartan 100mg daily  - monitor vitals    # PVD  - follows up with  outpatient  - vascular consult appreciated: Pt has chronic ulceration of the leg. there is no indication for intervention at this time. Continue local wound care. F/u as outpatient.      # DLD  - c/w simvastatin daily    Activity: ambulate as tolerated  diet: dash  dvt ppx: lovenox 40mg daily  gi ppx: not indicated  full code    #HANDOFF:   MAY BE D/JOSE TO SNF ON MONDAY.WILL NEED WOUND VAC AT SNF.

## 2019-04-29 ENCOUNTER — TRANSCRIPTION ENCOUNTER (OUTPATIENT)
Age: 84
End: 2019-04-29

## 2019-04-29 VITALS
SYSTOLIC BLOOD PRESSURE: 146 MMHG | TEMPERATURE: 98 F | RESPIRATION RATE: 16 BRPM | DIASTOLIC BLOOD PRESSURE: 68 MMHG | HEART RATE: 72 BPM

## 2019-04-29 LAB
ALBUMIN SERPL ELPH-MCNC: 3.2 G/DL — LOW (ref 3.5–5.2)
ALP SERPL-CCNC: 129 U/L — HIGH (ref 30–115)
ALT FLD-CCNC: 11 U/L — SIGNIFICANT CHANGE UP (ref 0–41)
ANION GAP SERPL CALC-SCNC: 10 MMOL/L — SIGNIFICANT CHANGE UP (ref 7–14)
AST SERPL-CCNC: 15 U/L — SIGNIFICANT CHANGE UP (ref 0–41)
BILIRUB SERPL-MCNC: 0.4 MG/DL — SIGNIFICANT CHANGE UP (ref 0.2–1.2)
BUN SERPL-MCNC: 16 MG/DL — SIGNIFICANT CHANGE UP (ref 10–20)
CALCIUM SERPL-MCNC: 8.8 MG/DL — SIGNIFICANT CHANGE UP (ref 8.5–10.1)
CHLORIDE SERPL-SCNC: 105 MMOL/L — SIGNIFICANT CHANGE UP (ref 98–110)
CO2 SERPL-SCNC: 27 MMOL/L — SIGNIFICANT CHANGE UP (ref 17–32)
CREAT SERPL-MCNC: 0.8 MG/DL — SIGNIFICANT CHANGE UP (ref 0.7–1.5)
GLUCOSE SERPL-MCNC: 97 MG/DL — SIGNIFICANT CHANGE UP (ref 70–99)
HCT VFR BLD CALC: 33.6 % — LOW (ref 37–47)
HGB BLD-MCNC: 10.5 G/DL — LOW (ref 12–16)
MCHC RBC-ENTMCNC: 29.7 PG — SIGNIFICANT CHANGE UP (ref 27–31)
MCHC RBC-ENTMCNC: 31.3 G/DL — LOW (ref 32–37)
MCV RBC AUTO: 94.9 FL — SIGNIFICANT CHANGE UP (ref 81–99)
NRBC # BLD: 0 /100 WBCS — SIGNIFICANT CHANGE UP (ref 0–0)
PLATELET # BLD AUTO: 275 K/UL — SIGNIFICANT CHANGE UP (ref 130–400)
POTASSIUM SERPL-MCNC: 4.2 MMOL/L — SIGNIFICANT CHANGE UP (ref 3.5–5)
POTASSIUM SERPL-SCNC: 4.2 MMOL/L — SIGNIFICANT CHANGE UP (ref 3.5–5)
PROT SERPL-MCNC: 5.6 G/DL — LOW (ref 6–8)
RBC # BLD: 3.54 M/UL — LOW (ref 4.2–5.4)
RBC # FLD: 14.4 % — SIGNIFICANT CHANGE UP (ref 11.5–14.5)
SODIUM SERPL-SCNC: 142 MMOL/L — SIGNIFICANT CHANGE UP (ref 135–146)
WBC # BLD: 4.29 K/UL — LOW (ref 4.8–10.8)
WBC # FLD AUTO: 4.29 K/UL — LOW (ref 4.8–10.8)

## 2019-04-29 RX ORDER — ACETAMINOPHEN 500 MG
2 TABLET ORAL
Qty: 0 | Refills: 0 | DISCHARGE
Start: 2019-04-29

## 2019-04-29 RX ORDER — CIPROFLOXACIN LACTATE 400MG/40ML
1 VIAL (ML) INTRAVENOUS
Qty: 7 | Refills: 0
Start: 2019-04-29 | End: 2019-05-05

## 2019-04-29 RX ORDER — DOCUSATE SODIUM 100 MG
100 CAPSULE ORAL DAILY
Qty: 0 | Refills: 0 | Status: DISCONTINUED | OUTPATIENT
Start: 2019-04-29 | End: 2019-04-29

## 2019-04-29 RX ORDER — METRONIDAZOLE 500 MG
1 TABLET ORAL
Qty: 14 | Refills: 0
Start: 2019-04-29 | End: 2019-05-05

## 2019-04-29 RX ORDER — SENNA PLUS 8.6 MG/1
2 TABLET ORAL
Qty: 0 | Refills: 0 | DISCHARGE
Start: 2019-04-29

## 2019-04-29 RX ORDER — DOCUSATE SODIUM 100 MG
1 CAPSULE ORAL
Qty: 0 | Refills: 0 | DISCHARGE
Start: 2019-04-29

## 2019-04-29 RX ADMIN — Medication 81 MILLIGRAM(S): at 11:52

## 2019-04-29 RX ADMIN — Medication 100 MILLIGRAM(S): at 11:52

## 2019-04-29 RX ADMIN — CHLORHEXIDINE GLUCONATE 1 APPLICATION(S): 213 SOLUTION TOPICAL at 05:29

## 2019-04-29 RX ADMIN — LOSARTAN POTASSIUM 100 MILLIGRAM(S): 100 TABLET, FILM COATED ORAL at 05:28

## 2019-04-29 RX ADMIN — ENOXAPARIN SODIUM 40 MILLIGRAM(S): 100 INJECTION SUBCUTANEOUS at 11:52

## 2019-04-29 RX ADMIN — PIPERACILLIN AND TAZOBACTAM 25 GRAM(S): 4; .5 INJECTION, POWDER, LYOPHILIZED, FOR SOLUTION INTRAVENOUS at 13:25

## 2019-04-29 RX ADMIN — Medication 1 APPLICATION(S): at 05:28

## 2019-04-29 RX ADMIN — OLANZAPINE 2.5 MILLIGRAM(S): 15 TABLET, FILM COATED ORAL at 05:29

## 2019-04-29 RX ADMIN — PIPERACILLIN AND TAZOBACTAM 25 GRAM(S): 4; .5 INJECTION, POWDER, LYOPHILIZED, FOR SOLUTION INTRAVENOUS at 05:28

## 2019-04-29 NOTE — DISCHARGE NOTE NURSING/CASE MANAGEMENT/SOCIAL WORK - NSDCDPATPORTLINK_GEN_ALL_CORE
You can access the [facility], offered by [facility], by registering with the following website: [email or web address]

## 2019-04-29 NOTE — DIETITIAN INITIAL EVALUATION ADULT. - OTHER INFO
Reason for assessment: LOS. PMH: PVD, chronic wounds to left lower extremity, HTN. Pt presented to ED for worsening ulceration of LLE. Pt is admitted for infected venous stasis ulcer w/ adherent necrotic tissue to left lower leg. S/p debridement of wound and wound vac. Pending d/c to SNF. Pt denies wt loss. Last BM was 4/29. Abdomen noted as soft/nontender/nondistended + BS. NKFA.

## 2019-04-29 NOTE — DISCHARGE NOTE PROVIDER - PROVIDER TOKENS
PROVIDER:[TOKEN:[[id number]]],PROVIDER:[TOKEN:[[id number]]],PROVIDER:[TOKEN:[[id number]]]

## 2019-04-29 NOTE — DISCHARGE NOTE PROVIDER - CARE PROVIDERS DIRECT ADDRESSES
[email or web address]

## 2019-04-29 NOTE — DISCHARGE NOTE PROVIDER - CARE PROVIDER_API CALL
David Kurtz)  Plastic Surgery  [address] [place] [address]  Phone: [phone]  Fax: [phone]  Follow Up Time:     Ann Marie Banerjee)  Internal Medicine  [address] [place] [address]  Phone: [phone]  Fax: [phone]  Follow Up Time:     Wilian Crenshaw)  Surgery; Vascular Surgery  [address] [address]  Phone: [phone]  Fax: [phone]  Follow Up Time:

## 2019-04-29 NOTE — DISCHARGE NOTE PROVIDER - HOSPITAL COURSE
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 Patient has had chronic wounds on the left ankle that she developed after a fall.  Since the fall the wounds never healed and the patient has chronic leg swelling. Patient reports that these wounds have drained significantly over the past few weeks.  She was admitted in march for infected wounds which were treated.  At the time arterial duplex showed stenosis of the SFA she underwent left SFA angioplasty on 3/12/19 with Dr. Crenshaw. During outpatient arterial duplex performed during postoperative visit 03/28 showed patent left femoropopliteal arteries without any restenosis. Patient was evaluated by burn, infectious disease and vascular team.  Burn team took patient to the OR for debridement of wound, excision to and including fascia and sent OR cultures.  Based on results, infection disease recommended IV Zosyn 3.375g q8h with change to oral levaquin 500mg q24h (qtc 424) and flagyl 500mg PO BID to complete 14 days post OR (end 5/6).  Patient had venous and arterial duplex done; Vascular team saw patient and advised No acute vascular intervention indicated and Continue local wound care and treatment with antibiotics.  Burn's final wound care is as follows: soap and water qd, santyl ointment and xeroform gauze and kerlex gauze dressing bid, ace wrap for toes to knees.  Patient has been participating with PT during admission, however, sporadically.  Patient is however hemodynamically stable and safe for discharge to rehab facility to continue work on her functional status. 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 Patient has had chronic wounds on the left ankle that she developed after a fall.  Since the fall the wounds never healed and the patient has chronic leg swelling. Patient reports that these wounds have drained significantly over the past few weeks.  She was admitted in march for infected wounds which were treated.  At the time arterial duplex showed stenosis of the SFA she underwent left SFA angioplasty on 3/12/19 with Dr. Crenshaw. During outpatient arterial duplex performed during postoperative visit 03/28 showed patent left femoropopliteal arteries without any restenosis. Patient was evaluated by burn, infectious disease and vascular team.  Burn team took patient to the OR for debridement of wound, excision to and including fascia and sent OR cultures.  Based on results, infection disease recommended IV Zosyn 3.375g q8h with change to oral levaquin 500mg q24h (qtc 424) and flagyl 500mg PO BID to complete 14 days post OR (end 5/6).  Patient had venous and arterial duplex done; Vascular team saw patient and advised No acute vascular intervention indicated and Continue local wound care and treatment with antibiotics.  Burn's final wound care is as follows: soap and water qd, santyl ointment and xeroform gauze and kerlex gauze dressing bid, ace wrap for toes to knees.  Patient has been participating with PT during admission, however, sporadically.  Patient is however hemodynamically stable and safe for discharge to rehab facility to continue work on her functional status.          Attending NOte:         86 y/o female with pmh of PVD, chronic wounds to the left lower extremity, HTN, presents to the ED with worsening ulceration of the LLE.        # Infected Venous stasis ulcer with adherent necrotic tissue left lower leg-->     - s/p Debridement of wound, excision to and including fascia    OR cultures - - PAn sensitive Pseudomonas & coag negative staph only sensitive to VAnco (may be a colonizer?).     s/p zosyn while in-house - on d/c PO levaquin 500mg q24h (qtc 424) and flagyl 500mg PO BID to complete 14 days post OR (end 5/6)     Wound care per Burn at [facility] term [facility]            # HTN    - losartan 100mg daily    - monitor vitals        # PVD    - follows up with  outpatient    - vascular consult appreciated: Pt has chronic ulceration of the leg. there is no indication for intervention at this time. Continue local wound care. F/u as outpatient.          # DLD    - c/w simvastatin daily        D/c to SNF Today.

## 2019-04-29 NOTE — DIETITIAN INITIAL EVALUATION ADULT. - ENERGY NEEDS
Calories: 1116-1228kcals/day (MSJ A.F 1-1.1) lower A.F due to obesity   Protein: 45-55g/day (1-1.2g/kg)   Fluid: 1:1ml/kcal or fluid as per LIP

## 2019-04-29 NOTE — DISCHARGE NOTE PROVIDER - NSDCCPCAREPLAN_GEN_ALL_CORE_FT
PRINCIPAL DISCHARGE DIAGNOSIS  Diagnosis: Skin ulcer of left calf, with unspecified severity  Assessment and Plan of Treatment: You were admitted for a venous statis ulcer with necrotic tissue of the left lower leg.  You were evaluated by burn, infectious disease and vascular team.  Burn team took you to the OR for debridement of wound, excision to and including fascia and sent wound cultures.  Based on results, infection disease recommended following antibiotic regimen:  IV Zosyn 3.375g q8h with change to oral levaquin 500mg q24h (qtc 424) and flagyl 500mg PO BID to complete 14 days post OR (end 5/6). Please take that regimen as prescribed, even if you remain asymptomatic. You had venous and arterial duplex done; Vascular team evaluated you and advised No acute intervention and to Continue local wound care and treatment with antibiotics.  Burn's final wound care instructions are as follows: soap and water qd, santyl ointment and xeroform gauze and kerlex gauze dressing bid, ace wrap for toes to knees. Please follow up wiht burn, infectious disease and vascular within 1 week of discharge.

## 2019-04-30 ENCOUNTER — OUTPATIENT (OUTPATIENT)
Dept: OUTPATIENT SERVICES | Facility: HOSPITAL | Age: 84
LOS: 1 days | Discharge: HOME | End: 2019-04-30

## 2019-04-30 DIAGNOSIS — R10.9 UNSPECIFIED ABDOMINAL PAIN: ICD-10-CM

## 2019-04-30 DIAGNOSIS — D64.9 ANEMIA, UNSPECIFIED: ICD-10-CM

## 2019-05-08 DIAGNOSIS — L97.829 NON-PRESSURE CHRONIC ULCER OF OTHER PART OF LEFT LOWER LEG WITH UNSPECIFIED SEVERITY: ICD-10-CM

## 2019-05-08 DIAGNOSIS — E78.5 HYPERLIPIDEMIA, UNSPECIFIED: ICD-10-CM

## 2019-05-08 DIAGNOSIS — L03.116 CELLULITIS OF LEFT LOWER LIMB: ICD-10-CM

## 2019-05-08 DIAGNOSIS — C46.9 KAPOSI'S SARCOMA, UNSPECIFIED: ICD-10-CM

## 2019-05-08 DIAGNOSIS — Z79.82 LONG TERM (CURRENT) USE OF ASPIRIN: ICD-10-CM

## 2019-05-08 DIAGNOSIS — I73.9 PERIPHERAL VASCULAR DISEASE, UNSPECIFIED: ICD-10-CM

## 2019-05-08 DIAGNOSIS — I10 ESSENTIAL (PRIMARY) HYPERTENSION: ICD-10-CM

## 2019-05-08 DIAGNOSIS — I83.028 VARICOSE VEINS OF LEFT LOWER EXTREMITY WITH ULCER OTHER PART OF LOWER LEG: ICD-10-CM

## 2019-05-28 ENCOUNTER — APPOINTMENT (OUTPATIENT)
Dept: BURN CARE | Facility: CLINIC | Age: 84
End: 2019-05-28

## 2019-05-28 ENCOUNTER — OUTPATIENT (OUTPATIENT)
Dept: OUTPATIENT SERVICES | Facility: HOSPITAL | Age: 84
LOS: 1 days | Discharge: HOME | End: 2019-05-28

## 2019-05-28 RX ORDER — COLLAGENASE SANTYL 250 [ARB'U]/G
250 OINTMENT TOPICAL DAILY
Qty: 1 | Refills: 0 | Status: ACTIVE | COMMUNITY
Start: 2019-05-28 | End: 1900-01-01

## 2019-05-28 NOTE — HISTORY OF PRESENT ILLNESS
[de-identified] : Chronic left foot wound \par  [de-identified] : Pt s/p debridement of chronic left foot wound ~ 3 weeks ago. Also s/p left fem angioplasty \par Reports constant drainage from wound site. Wound care Xeroform

## 2019-05-28 NOTE — ASSESSMENT
[FreeTextEntry1] : Chronic wound of left foot \par REc ; Santyl ointment and wet to dry dressing to aid in debridement if wound \par Attempted mechanical debridement - painful\par \par Long discussion with pt and  . concerns addressed\par Advised consultation with PMD - re poss diuretic  [Wound Care] : wound care

## 2019-05-28 NOTE — PHYSICAL EXAM
[Infected?] : Infected: No [de-identified] : Left foot - medial wound extending to ankle ~ 13 x 7 cm with adherent yellow exudate ; weeping and edema, nodular skin changes \par mild redness - anterior surrounding skin \par \par  [] : no

## 2019-06-03 ENCOUNTER — INPATIENT (INPATIENT)
Facility: HOSPITAL | Age: 84
LOS: 2 days | Discharge: NURSING, REHAB OR LONG-TERM CARE | End: 2019-06-06
Attending: INTERNAL MEDICINE | Admitting: INTERNAL MEDICINE
Payer: MEDICARE

## 2019-06-03 VITALS
HEART RATE: 87 BPM | TEMPERATURE: 99 F | SYSTOLIC BLOOD PRESSURE: 127 MMHG | DIASTOLIC BLOOD PRESSURE: 67 MMHG | OXYGEN SATURATION: 98 % | RESPIRATION RATE: 18 BRPM

## 2019-06-03 DIAGNOSIS — L97.321 NON-PRESSURE CHRONIC ULCER OF LEFT ANKLE LIMITED TO BREAKDOWN OF SKIN: ICD-10-CM
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
## 2019-06-03 DIAGNOSIS — Z96.649 PRESENCE OF UNSPECIFIED ARTIFICIAL HIP JOINT: Chronic | ICD-10-CM
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
## 2019-06-03 DIAGNOSIS — S91.302A UNSPECIFIED OPEN WOUND, LEFT FOOT, INITIAL ENCOUNTER: ICD-10-CM

## 2019-06-03 LAB
ALBUMIN SERPL ELPH-MCNC: 3 G/DL — LOW (ref 3.5–5.2)
ALP SERPL-CCNC: 176 U/L — HIGH (ref 30–115)
ALT FLD-CCNC: 14 U/L — SIGNIFICANT CHANGE UP (ref 0–41)
ANION GAP SERPL CALC-SCNC: 14 MMOL/L — SIGNIFICANT CHANGE UP (ref 7–14)
APTT BLD: 28.9 SEC — SIGNIFICANT CHANGE UP (ref 27–39.2)
AST SERPL-CCNC: 19 U/L — SIGNIFICANT CHANGE UP (ref 0–41)
BASE EXCESS BLDV CALC-SCNC: 2.6 MMOL/L — HIGH (ref -2–2)
BASOPHILS # BLD AUTO: 0.03 K/UL — SIGNIFICANT CHANGE UP (ref 0–0.2)
BASOPHILS NFR BLD AUTO: 0.4 % — SIGNIFICANT CHANGE UP (ref 0–1)
BILIRUB SERPL-MCNC: 0.3 MG/DL — SIGNIFICANT CHANGE UP (ref 0.2–1.2)
BUN SERPL-MCNC: 16 MG/DL — SIGNIFICANT CHANGE UP (ref 10–20)
CA-I SERPL-SCNC: 1.16 MMOL/L — SIGNIFICANT CHANGE UP (ref 1.12–1.3)
CALCIUM SERPL-MCNC: 8.4 MG/DL — LOW (ref 8.5–10.1)
CHLORIDE SERPL-SCNC: 95 MMOL/L — LOW (ref 98–110)
CO2 SERPL-SCNC: 24 MMOL/L — SIGNIFICANT CHANGE UP (ref 17–32)
CREAT SERPL-MCNC: 0.9 MG/DL — SIGNIFICANT CHANGE UP (ref 0.7–1.5)
EOSINOPHIL # BLD AUTO: 0.24 K/UL — SIGNIFICANT CHANGE UP (ref 0–0.7)
EOSINOPHIL NFR BLD AUTO: 3.3 % — SIGNIFICANT CHANGE UP (ref 0–8)
GAS PNL BLDV: 134 MMOL/L — LOW (ref 136–145)
GAS PNL BLDV: SIGNIFICANT CHANGE UP
GLUCOSE SERPL-MCNC: 152 MG/DL — HIGH (ref 70–99)
HCO3 BLDV-SCNC: 28 MMOL/L — SIGNIFICANT CHANGE UP (ref 22–29)
HCT VFR BLD CALC: 31.4 % — LOW (ref 37–47)
HCT VFR BLDA CALC: 35 % — SIGNIFICANT CHANGE UP (ref 34–44)
HGB BLD CALC-MCNC: 11.4 G/DL — LOW (ref 14–18)
HGB BLD-MCNC: 9.9 G/DL — LOW (ref 12–16)
IMM GRANULOCYTES NFR BLD AUTO: 1 % — HIGH (ref 0.1–0.3)
INR BLD: 1.11 RATIO — SIGNIFICANT CHANGE UP (ref 0.65–1.3)
LACTATE BLDV-MCNC: 1.8 MMOL/L — HIGH (ref 0.5–1.6)
LYMPHOCYTES # BLD AUTO: 0.34 K/UL — LOW (ref 1.2–3.4)
LYMPHOCYTES # BLD AUTO: 4.7 % — LOW (ref 20.5–51.1)
MCHC RBC-ENTMCNC: 28.9 PG — SIGNIFICANT CHANGE UP (ref 27–31)
MCHC RBC-ENTMCNC: 31.5 G/DL — LOW (ref 32–37)
MCV RBC AUTO: 91.5 FL — SIGNIFICANT CHANGE UP (ref 81–99)
MONOCYTES # BLD AUTO: 0.97 K/UL — HIGH (ref 0.1–0.6)
MONOCYTES NFR BLD AUTO: 13.3 % — HIGH (ref 1.7–9.3)
NEUTROPHILS # BLD AUTO: 5.65 K/UL — SIGNIFICANT CHANGE UP (ref 1.4–6.5)
NEUTROPHILS NFR BLD AUTO: 77.3 % — HIGH (ref 42.2–75.2)
NRBC # BLD: 0 /100 WBCS — SIGNIFICANT CHANGE UP (ref 0–0)
PCO2 BLDV: 48 MMHG — SIGNIFICANT CHANGE UP (ref 41–51)
PH BLDV: 7.38 — SIGNIFICANT CHANGE UP (ref 7.26–7.43)
PLATELET # BLD AUTO: 374 K/UL — SIGNIFICANT CHANGE UP (ref 130–400)
PO2 BLDV: 30 MMHG — SIGNIFICANT CHANGE UP (ref 20–40)
POTASSIUM BLDV-SCNC: 4.1 MMOL/L — SIGNIFICANT CHANGE UP (ref 3.3–5.6)
POTASSIUM SERPL-MCNC: 4.7 MMOL/L — SIGNIFICANT CHANGE UP (ref 3.5–5)
POTASSIUM SERPL-SCNC: 4.7 MMOL/L — SIGNIFICANT CHANGE UP (ref 3.5–5)
PROT SERPL-MCNC: 5.9 G/DL — LOW (ref 6–8)
PROTHROM AB SERPL-ACNC: 12.8 SEC — SIGNIFICANT CHANGE UP (ref 9.95–12.87)
RBC # BLD: 3.43 M/UL — LOW (ref 4.2–5.4)
RBC # FLD: 14.5 % — SIGNIFICANT CHANGE UP (ref 11.5–14.5)
SAO2 % BLDV: 52 % — SIGNIFICANT CHANGE UP
SODIUM SERPL-SCNC: 133 MMOL/L — LOW (ref 135–146)
WBC # BLD: 7.3 K/UL — SIGNIFICANT CHANGE UP (ref 4.8–10.8)
WBC # FLD AUTO: 7.3 K/UL — SIGNIFICANT CHANGE UP (ref 4.8–10.8)

## 2019-06-03 PROCEDURE — 99285 EMERGENCY DEPT VISIT HI MDM: CPT | Mod: GC

## 2019-06-03 PROCEDURE — 73610 X-RAY EXAM OF ANKLE: CPT | Mod: 26,LT

## 2019-06-03 PROCEDURE — 73630 X-RAY EXAM OF FOOT: CPT | Mod: 26,LT

## 2019-06-03 PROCEDURE — 93010 ELECTROCARDIOGRAM REPORT: CPT

## 2019-06-03 PROCEDURE — 71045 X-RAY EXAM CHEST 1 VIEW: CPT | Mod: 26

## 2019-06-03 RX ORDER — CEFAZOLIN SODIUM 1 G
2000 VIAL (EA) INJECTION ONCE
Refills: 0 | Status: COMPLETED | OUTPATIENT
Start: 2019-06-03 | End: 2019-06-03

## 2019-06-03 RX ADMIN — Medication 100 MILLIGRAM(S): at 22:12

## 2019-06-03 NOTE — H&P ADULT - ATTENDING COMMENTS
84 yo F wth PVD, HTN, chronic stasis ulcer, multiple debridement by Burn p/w 2 days fever and chills. Patient was following Dr Kurtz after being discharged from hospital 5/29 then from past 2 days she was having fever with chills and some watery discharge from the right foot wound.    Pt seen and examined- comfortable    Chart reviewed- agree with above    IV abx    burn eval    ID eval     wound care    DVT proph    OOB

## 2019-06-03 NOTE — ED PROVIDER NOTE - OBJECTIVE STATEMENT
84 y/o female with pmh of PVD, chronic wounds to the left lower extremity, HTN, who presents to ED for tactile fever and worsening wound to the LLE. NOT DONE. 84 y/o female with pmh of PVD, chronic wounds to the left lower extremity, HTN, who presents to ED for tactile fever and worsening wound to the LLE. Pt was seen by her PMD Dr. Drake and sent to ED for admission for wound infection. No cough, congestion, runny nose, sore throat. No abdominal pain, nausea, vomiting or urinary complaints. No recent travel, ill contacts.

## 2019-06-03 NOTE — ED ADULT NURSE NOTE - NSIMPLEMENTINTERV_GEN_ALL_ED
Implemented All Fall with Harm Risk Interventions:  [place] to call system. Call bell, personal items and telephone within reach. Instruct patient to call for assistance. Room bathroom lighting operational. Non-slip footwear when patient is off stretcher. Physically safe environment: no spills, clutter or unnecessary equipment. Stretcher in lowest position, wheels locked, appropriate side rails in place. Provide visual cue, wrist band, yellow gown, etc. Monitor gait and stability. Monitor for mental status changes and reorient to person, place, and time. Review medications for side effects contributing to fall risk. Reinforce activity limits and safety measures with patient and family. Provide visual clues: red socks.

## 2019-06-03 NOTE — H&P ADULT - NSHPLABSRESULTS_GEN_ALL_CORE
9.9    7.30  )-----------( 374      ( 03 Jun 2019 20:30 )             31.4       06-03    133<L>  |  95<L>  |  16  ----------------------------<  152<H>  4.7   |  24  |  0.9    Ca    8.4<L>      03 Jun 2019 20:30    TPro  5.9<L>  /  Alb  3.0<L>  /  TBili  0.3  /  DBili  x   /  AST  19  /  ALT  14  /  AlkPhos  176<H>  06-03        PT/INR - ( 03 Jun 2019 20:30 )   PT: 12.80 sec;   INR: 1.11 ratio         PTT - ( 03 Jun 2019 20:30 )  PTT:28.9 sec      Blood Gas Venous - Lactate: 1.8 mmoL/L (06.03.19 @ 20:48)

## 2019-06-03 NOTE — ED PROVIDER NOTE - NS ED ROS FT
Review of Systems:  •	CONSTITUTIONAL - + fever, No diaphoresis, No weight change  •	SKIN - No rash  •	HEMATOLOGIC - No abnormal bleeding or bruising  •	EYES - No eye pain, No blurred vision  •	ENT - No change in hearing, No sore throat, No neck pain, No rhinorrhea, No ear pain  •	RESPIRATORY - No shortness of breath, No cough  •	CARDIAC -No chest pain, No palpitations  •	GI - No abdominal pain, No nausea, No vomiting, No diarrhea, No constipation, No bright red blood per rectum or melena. No flank pain  •             - No dysuria, frequency, hematuria.   •	ENDO - No polydypsia, No polyuria, No heat/cold intolerance  •	MUSCULOSKELETAL - No joint paint, No swelling, No back pain + chronic ulcers LE  •	NEUROLOGIC - No numbness, No focal weakness, No headache, No dizziness  All other systems negative, unless specified in HPI Review of Systems:  •	CONSTITUTIONAL - + fever, No diaphoresis, No weight change  •	SKIN - No rash  •	HEMATOLOGIC - No abnormal bleeding or bruising  •	EYES - No eye pain, No blurred vision  •	ENT - No change in hearing, No sore throat, No neck pain, No rhinorrhea, No ear pain  •	RESPIRATORY - No shortness of breath, No cough  •	CARDIAC -No chest pain, No palpitations  •	GI - No abdominal pain, No nausea, No vomiting, No diarrhea, No constipation, No bright red blood per rectum or melena. No               flank pain  •             - No dysuria, frequency, hematuria.   •	ENDO - No polydypsia, No polyuria, No heat/cold intolerance  •	MUSCULOSKELETAL - No joint paint, No swelling, No back pain + chronic ulcers LE  •	NEUROLOGIC - No numbness, No focal weakness, No headache, No dizziness  All other systems negative, unless specified in HPI

## 2019-06-03 NOTE — ED ADULT NURSE NOTE - OBJECTIVE STATEMENT
Pt sent by LETTY Drake for L foot ulcer infection, swelling, redness and fever x 4days. Denies any other symptoms

## 2019-06-03 NOTE — CONSULT NOTE ADULT - ASSESSMENT
86 y/o F with full thickness wound to LLE 86 y/o F with full thickness wound to LLE    Vital Signs Last 24 Hrs  T(C): 37.2 (03 Jun 2019 19:25), Max: 37.2 (03 Jun 2019 19:25)  T(F): 98.9 (03 Jun 2019 19:25), Max: 98.9 (03 Jun 2019 19:25)  HR: 87 (03 Jun 2019 19:25) (87 - 87)  BP: 127/67 (03 Jun 2019 19:25) (127/67 - 127/67)  BP(mean): --  RR: 18 (03 Jun 2019 19:25) (18 - 18)  SpO2: 98% (03 Jun 2019 19:25) (98% - 98%)    .  LABS:                         9.9    7.30  )-----------( 374      ( 03 Jun 2019 20:30 )             31.4     06-03    133<L>  |  95<L>  |  16  ----------------------------<  152<H>  4.7   |  24  |  0.9    Ca    8.4<L>      03 Jun 2019 20:30    TPro  5.9<L>  /  Alb  3.0<L>  /  TBili  0.3  /  DBili  x   /  AST  19  /  ALT  14  /  AlkPhos  176<H>  06-03    PT/INR - ( 03 Jun 2019 20:30 )   PT: 12.80 sec;   INR: 1.11 ratio         PTT - ( 03 Jun 2019 20:30 )  PTT:28.9 sec

## 2019-06-03 NOTE — CONSULT NOTE ADULT - PROBLEM SELECTOR RECOMMENDATION 9
Wash with soap and water, dress with santyl WTD BID  IV antibiotics, consider ID c/s  Patient may need sx at a later date, attending note to follow    ER attending raised concern for Necrotizing Fascitis. Patient has a WBC of 7.30, is afebrile, normotensive, comfortable in bed. X-ray showed air which may be consistent with open wound. X-ray pending official radiology read. If there is a concern for necrotizing fascitis then a CT scan and general surgery consult is recommended.

## 2019-06-03 NOTE — CONSULT NOTE ADULT - SUBJECTIVE AND OBJECTIVE BOX
84 y/o female with pmh of PVD, chronic wounds to the left lower extremity, HTN, who presents to ED for tactile fever and worsening wound to the LLE. Pt was seen by her PMD Dr. Drake and sent to ED for admission for wound infection. No cough, congestion, runny nose, sore throat. No abdominal pain, nausea, vomiting or urinary complaints. No recent travel, ill contacts.    Patient was recently admitted to [facility] medicine team on 4/22/19 and debrided by the Burn Team 4/23/19. Patient now presents 84 y/o female with pmh of PVD, chronic wounds to the left lower extremity, HTN, who presents to ED for tactile fever and worsening wound to the LLE. Pt was seen by her PMD Dr. Drake and sent to ED for admission for wound infection. No cough, congestion, runny nose, sore throat. No abdominal pain, nausea, vomiting or urinary complaints. No recent travel, ill contacts.    Patient was recently admitted to [facility] medicine team on 4/22/19 and debrided by the Burn Team 4/23/19. Patient now presents with open wound extending from the medial left lower extremity above the ankle to the posterior area of the left lower extremity. patient states she was seen by her PMD and c/o fevers and redness around the wound so she was sent in for wound evaluation. Upon presentation patient is afebrile, normotensive, lying comfortably in bed.     Wound is about 6cm x 20 cm extending from the medial LLE to the posterior LLE above the ankle. Surrounding skin is slightly erythematous and warm. Area is slightly tender to touch but patient is able to tolerate cleaning and wound care comfortably without pain medication. Wound is a full thickness wound with yellow tissue with no drainage present at the time of examination, There was yellow discoloration on the gauze that was removed from the wound.

## 2019-06-03 NOTE — ED PROVIDER NOTE - PROGRESS NOTE DETAILS
Pt seen by burn, will consult. Admit to medicine. Discussed xray with burn and surgery. Per burn PA, Dr. Barcenas reviewed xray, air is s/p wound debridement open tissue, not concern for nec fasc. No surgical intervention.

## 2019-06-03 NOTE — H&P ADULT - HISTORY OF PRESENT ILLNESS
84 yo F wth PVD, HTN, chronic stasis ulcer, multiple debridement by Burn p/w 2 days fever and chills. Patient was following Dr Kurtz after being discharged from hospital 5/29 then from past 2 days she was having fever with chills and some watery discharge from the right foot wound. She didn't c/o extreme pain in the right leg, no trauma reported.  tried to contact Dr Kurtz but since he was on vacation he decided to bring her to hospital.

## 2019-06-03 NOTE — ED PROVIDER NOTE - ATTENDING CONTRIBUTION TO CARE
84 y/o female with pmh of PVD, chronic wounds to the left lower extremity, HTN, who presents to ED for tactile fever and worsening wound to the LLE. No cp/sob, no n/v/d, no loc, no fever.     CONSTITUTIONAL: Well-developed; well-nourished; in no acute distress. Sitting up and providing appropriate history and physical examination  SKIN: see below. Remainder of skin exam is warm and dry, no acute rash.  HEAD: Normocephalic; atraumatic.  EXT: + LLE foot erythema, warmth, well appearing and healing wound, no obvious purulent discharge, no crepitis, Normal ROM. No clubbing, cyanosis or edema. Dp and Pt Pulses intact. Cap refill less than 3 seconds  NEURO: Alert, oriented, grossly unremarkable. No Focal deficits. GCS 15. NIH 0  PSYCH: Cooperative, appropriate.

## 2019-06-03 NOTE — H&P ADULT - NSHPREVIEWOFSYSTEMS_GEN_ALL_CORE
Constitutional: fever, chills+  Eyes: No visual changes, eye pain or discharge. No Photophobia  ENMT: No hearing changes, pain, discharge or infections. No neck pain or stiffness. No limited ROM  Cardiac: No SOB or edema. No chest pain with exertion.  Respiratory: No cough or respiratory distress. No hemoptysis. No history of asthma or RAD.  GI: No nausea, vomiting, diarrhea or abdominal pain.  : No dysuria, frequency or burning. No Discharge  MS: b/l lower extremity wound    Neuro: No headache or weakness. No LOC.  Skin: see HPI

## 2019-06-03 NOTE — H&P ADULT - ASSESSMENT
86 yo F wth PVD, HTN, chronic stasis ulcer, multiple debridement by Burn p/w 2 days fever and chill.    # Right lower extremity cellulitis  - admit to medicine  - wound looked non infected but warm on palpation with clear discharge, will treat as cellulitis for now  - ID consult, vascular consult  - Burn consult appreciated  - not clinically concerning for NF, lactate 1.8, /62, non-tender on flexion and gentle palpation, and patient mental status maintained. Callback requested with read on Xray.   - start on Zosyn based on previous sensitivity  - check duplex for possible dvt    # PAD:  - c/w asa, statin  - Vascular consult if recommended by Burn  - c/w gabapentin for neuroapthy    # HTN:  - switch valsartan to losartan    From home  dvt ppx with sqh  dash diet

## 2019-06-03 NOTE — ED PROVIDER NOTE - PHYSICAL EXAMINATION
CONSTITUTIONAL: Well-developed; well-nourished; in no acute distress.   SKIN: warm, dry. + venous stasis ulcers to the LLE with surrounding erythema, no drainage.   HEAD: Normocephalic; atraumatic.  EYES: no conjunctival injection. PERRL.   ENT: No nasal discharge; airway clear.  NECK: Supple; non tender.  CARD: S1, S2 normal; no murmurs, gallops, or rubs. Regular rate and rhythm.   RESP: No wheezes, rales or rhonchi.  ABD: soft ntnd, no rebound or guarding.  EXT: Normal ROM.  No clubbing, cyanosis or edema.   LYMPH: No acute cervical adenopathy.  NEURO: Alert, oriented, grossly unremarkable  PSYCH: Cooperative, appropriate.

## 2019-06-03 NOTE — ED PROVIDER NOTE - CLINICAL SUMMARY MEDICAL DECISION MAKING FREE TEXT BOX
I personally evaluated the patient. I reviewed the Resident’s or Physician Assistant’s note (as assigned above), and agree with the findings and plan except as documented in my note. Labs and imaging reviewed, antibiotics initiated, patient evaluated by burn team, no surgical intervention at this time, requested that patient be admitted to medicine service for iv antibiotics. Patient well appearing on admission

## 2019-06-04 LAB
ALBUMIN SERPL ELPH-MCNC: 3 G/DL — LOW (ref 3.5–5.2)
ALP SERPL-CCNC: 175 U/L — HIGH (ref 30–115)
ALT FLD-CCNC: 10 U/L — SIGNIFICANT CHANGE UP (ref 0–41)
ANION GAP SERPL CALC-SCNC: 15 MMOL/L — HIGH (ref 7–14)
APPEARANCE UR: CLEAR — SIGNIFICANT CHANGE UP
AST SERPL-CCNC: 13 U/L — SIGNIFICANT CHANGE UP (ref 0–41)
BACTERIA # UR AUTO: ABNORMAL /HPF
BASOPHILS # BLD AUTO: 0.02 K/UL — SIGNIFICANT CHANGE UP (ref 0–0.2)
BASOPHILS NFR BLD AUTO: 0.3 % — SIGNIFICANT CHANGE UP (ref 0–1)
BILIRUB SERPL-MCNC: 0.4 MG/DL — SIGNIFICANT CHANGE UP (ref 0.2–1.2)
BILIRUB UR-MCNC: NEGATIVE — SIGNIFICANT CHANGE UP
BUN SERPL-MCNC: 14 MG/DL — SIGNIFICANT CHANGE UP (ref 10–20)
CALCIUM SERPL-MCNC: 8.5 MG/DL — SIGNIFICANT CHANGE UP (ref 8.5–10.1)
CHLORIDE SERPL-SCNC: 99 MMOL/L — SIGNIFICANT CHANGE UP (ref 98–110)
CO2 SERPL-SCNC: 23 MMOL/L — SIGNIFICANT CHANGE UP (ref 17–32)
COLOR SPEC: YELLOW — SIGNIFICANT CHANGE UP
CREAT SERPL-MCNC: 0.7 MG/DL — SIGNIFICANT CHANGE UP (ref 0.7–1.5)
DIFF PNL FLD: NEGATIVE — SIGNIFICANT CHANGE UP
EOSINOPHIL # BLD AUTO: 0.38 K/UL — SIGNIFICANT CHANGE UP (ref 0–0.7)
EOSINOPHIL NFR BLD AUTO: 6.2 % — SIGNIFICANT CHANGE UP (ref 0–8)
EPI CELLS # UR: ABNORMAL /HPF
GLUCOSE BLDC GLUCOMTR-MCNC: 187 MG/DL — HIGH (ref 70–99)
GLUCOSE SERPL-MCNC: 144 MG/DL — HIGH (ref 70–99)
GLUCOSE UR QL: NEGATIVE MG/DL — SIGNIFICANT CHANGE UP
HCT VFR BLD CALC: 32.2 % — LOW (ref 37–47)
HGB BLD-MCNC: 10.2 G/DL — LOW (ref 12–16)
IMM GRANULOCYTES NFR BLD AUTO: 0.7 % — HIGH (ref 0.1–0.3)
KETONES UR-MCNC: NEGATIVE — SIGNIFICANT CHANGE UP
LACTATE SERPL-SCNC: 1.8 MMOL/L — SIGNIFICANT CHANGE UP (ref 0.5–2.2)
LEUKOCYTE ESTERASE UR-ACNC: NEGATIVE — SIGNIFICANT CHANGE UP
LYMPHOCYTES # BLD AUTO: 0.28 K/UL — LOW (ref 1.2–3.4)
LYMPHOCYTES # BLD AUTO: 4.6 % — LOW (ref 20.5–51.1)
MCHC RBC-ENTMCNC: 29 PG — SIGNIFICANT CHANGE UP (ref 27–31)
MCHC RBC-ENTMCNC: 31.7 G/DL — LOW (ref 32–37)
MCV RBC AUTO: 91.5 FL — SIGNIFICANT CHANGE UP (ref 81–99)
MONOCYTES # BLD AUTO: 0.41 K/UL — SIGNIFICANT CHANGE UP (ref 0.1–0.6)
MONOCYTES NFR BLD AUTO: 6.7 % — SIGNIFICANT CHANGE UP (ref 1.7–9.3)
NEUTROPHILS # BLD AUTO: 5 K/UL — SIGNIFICANT CHANGE UP (ref 1.4–6.5)
NEUTROPHILS NFR BLD AUTO: 81.5 % — HIGH (ref 42.2–75.2)
NITRITE UR-MCNC: NEGATIVE — SIGNIFICANT CHANGE UP
NRBC # BLD: 0 /100 WBCS — SIGNIFICANT CHANGE UP (ref 0–0)
PH UR: 6 — SIGNIFICANT CHANGE UP (ref 5–8)
PLATELET # BLD AUTO: 421 K/UL — HIGH (ref 130–400)
POTASSIUM SERPL-MCNC: 4.2 MMOL/L — SIGNIFICANT CHANGE UP (ref 3.5–5)
POTASSIUM SERPL-SCNC: 4.2 MMOL/L — SIGNIFICANT CHANGE UP (ref 3.5–5)
PROT SERPL-MCNC: 6 G/DL — SIGNIFICANT CHANGE UP (ref 6–8)
PROT UR-MCNC: ABNORMAL MG/DL
RBC # BLD: 3.52 M/UL — LOW (ref 4.2–5.4)
RBC # FLD: 14.6 % — HIGH (ref 11.5–14.5)
SODIUM SERPL-SCNC: 137 MMOL/L — SIGNIFICANT CHANGE UP (ref 135–146)
SP GR SPEC: 1.02 — SIGNIFICANT CHANGE UP (ref 1.01–1.03)
UROBILINOGEN FLD QL: 0.2 MG/DL — SIGNIFICANT CHANGE UP (ref 0.2–0.2)
WBC # BLD: 6.13 K/UL — SIGNIFICANT CHANGE UP (ref 4.8–10.8)
WBC # FLD AUTO: 6.13 K/UL — SIGNIFICANT CHANGE UP (ref 4.8–10.8)
WBC UR QL: SIGNIFICANT CHANGE UP /HPF

## 2019-06-04 PROCEDURE — 99222 1ST HOSP IP/OBS MODERATE 55: CPT

## 2019-06-04 PROCEDURE — 93970 EXTREMITY STUDY: CPT | Mod: 26

## 2019-06-04 RX ORDER — COLLAGENASE CLOSTRIDIUM HIST. 250 UNIT/G
1 OINTMENT (GRAM) TOPICAL
Refills: 0 | Status: DISCONTINUED | OUTPATIENT
Start: 2019-06-04 | End: 2019-06-06

## 2019-06-04 RX ORDER — HEPARIN SODIUM 5000 [USP'U]/ML
5000 INJECTION INTRAVENOUS; SUBCUTANEOUS EVERY 8 HOURS
Refills: 0 | Status: DISCONTINUED | OUTPATIENT
Start: 2019-06-04 | End: 2019-06-06

## 2019-06-04 RX ORDER — ACETAMINOPHEN 500 MG
650 TABLET ORAL EVERY 6 HOURS
Refills: 0 | Status: DISCONTINUED | OUTPATIENT
Start: 2019-06-04 | End: 2019-06-06

## 2019-06-04 RX ORDER — PIPERACILLIN AND TAZOBACTAM 4; .5 G/20ML; G/20ML
3.38 INJECTION, POWDER, LYOPHILIZED, FOR SOLUTION INTRAVENOUS EVERY 6 HOURS
Refills: 0 | Status: DISCONTINUED | OUTPATIENT
Start: 2019-06-04 | End: 2019-06-06

## 2019-06-04 RX ORDER — PIPERACILLIN AND TAZOBACTAM 4; .5 G/20ML; G/20ML
3.38 INJECTION, POWDER, LYOPHILIZED, FOR SOLUTION INTRAVENOUS EVERY 8 HOURS
Refills: 0 | Status: DISCONTINUED | OUTPATIENT
Start: 2019-06-04 | End: 2019-06-04

## 2019-06-04 RX ORDER — LINEZOLID 600 MG/300ML
600 INJECTION, SOLUTION INTRAVENOUS EVERY 12 HOURS
Refills: 0 | Status: DISCONTINUED | OUTPATIENT
Start: 2019-06-05 | End: 2019-06-06

## 2019-06-04 RX ORDER — GABAPENTIN 400 MG/1
300 CAPSULE ORAL AT BEDTIME
Refills: 0 | Status: DISCONTINUED | OUTPATIENT
Start: 2019-06-04 | End: 2019-06-06

## 2019-06-04 RX ORDER — ASPIRIN/CALCIUM CARB/MAGNESIUM 324 MG
81 TABLET ORAL DAILY
Refills: 0 | Status: DISCONTINUED | OUTPATIENT
Start: 2019-06-04 | End: 2019-06-06

## 2019-06-04 RX ORDER — SENNA PLUS 8.6 MG/1
2 TABLET ORAL AT BEDTIME
Refills: 0 | Status: DISCONTINUED | OUTPATIENT
Start: 2019-06-04 | End: 2019-06-06

## 2019-06-04 RX ORDER — LOSARTAN POTASSIUM 100 MG/1
25 TABLET, FILM COATED ORAL DAILY
Refills: 0 | Status: DISCONTINUED | OUTPATIENT
Start: 2019-06-04 | End: 2019-06-04

## 2019-06-04 RX ORDER — DOCUSATE SODIUM 100 MG
100 CAPSULE ORAL DAILY
Refills: 0 | Status: DISCONTINUED | OUTPATIENT
Start: 2019-06-04 | End: 2019-06-06

## 2019-06-04 RX ORDER — SODIUM CHLORIDE 9 MG/ML
500 INJECTION, SOLUTION INTRAVENOUS ONCE
Refills: 0 | Status: COMPLETED | OUTPATIENT
Start: 2019-06-04 | End: 2019-06-04

## 2019-06-04 RX ORDER — LINEZOLID 600 MG/300ML
INJECTION, SOLUTION INTRAVENOUS
Refills: 0 | Status: DISCONTINUED | OUTPATIENT
Start: 2019-06-04 | End: 2019-06-06

## 2019-06-04 RX ORDER — LINEZOLID 600 MG/300ML
600 INJECTION, SOLUTION INTRAVENOUS ONCE
Refills: 0 | Status: COMPLETED | OUTPATIENT
Start: 2019-06-04 | End: 2019-06-04

## 2019-06-04 RX ORDER — COLLAGENASE CLOSTRIDIUM HIST. 250 UNIT/G
1 OINTMENT (GRAM) TOPICAL DAILY
Refills: 0 | Status: DISCONTINUED | OUTPATIENT
Start: 2019-06-04 | End: 2019-06-06

## 2019-06-04 RX ORDER — CHLORHEXIDINE GLUCONATE 213 G/1000ML
1 SOLUTION TOPICAL
Refills: 0 | Status: DISCONTINUED | OUTPATIENT
Start: 2019-06-04 | End: 2019-06-06

## 2019-06-04 RX ORDER — OLANZAPINE 15 MG/1
2.5 TABLET, FILM COATED ORAL
Refills: 0 | Status: DISCONTINUED | OUTPATIENT
Start: 2019-06-04 | End: 2019-06-06

## 2019-06-04 RX ORDER — SIMVASTATIN 20 MG/1
20 TABLET, FILM COATED ORAL AT BEDTIME
Refills: 0 | Status: DISCONTINUED | OUTPATIENT
Start: 2019-06-04 | End: 2019-06-06

## 2019-06-04 RX ORDER — SODIUM HYPOCHLORITE 0.125 %
1 SOLUTION, NON-ORAL MISCELLANEOUS DAILY
Refills: 0 | Status: DISCONTINUED | OUTPATIENT
Start: 2019-06-04 | End: 2019-06-06

## 2019-06-04 RX ADMIN — Medication 81 MILLIGRAM(S): at 12:44

## 2019-06-04 RX ADMIN — PIPERACILLIN AND TAZOBACTAM 25 GRAM(S): 4; .5 INJECTION, POWDER, LYOPHILIZED, FOR SOLUTION INTRAVENOUS at 18:08

## 2019-06-04 RX ADMIN — Medication 100 MILLIGRAM(S): at 12:45

## 2019-06-04 RX ADMIN — Medication 650 MILLIGRAM(S): at 06:38

## 2019-06-04 RX ADMIN — Medication 650 MILLIGRAM(S): at 12:40

## 2019-06-04 RX ADMIN — Medication 1 APPLICATION(S): at 18:03

## 2019-06-04 RX ADMIN — LOSARTAN POTASSIUM 25 MILLIGRAM(S): 100 TABLET, FILM COATED ORAL at 05:18

## 2019-06-04 RX ADMIN — PIPERACILLIN AND TAZOBACTAM 25 GRAM(S): 4; .5 INJECTION, POWDER, LYOPHILIZED, FOR SOLUTION INTRAVENOUS at 09:14

## 2019-06-04 RX ADMIN — Medication 650 MILLIGRAM(S): at 05:17

## 2019-06-04 RX ADMIN — LINEZOLID 300 MILLIGRAM(S): 600 INJECTION, SOLUTION INTRAVENOUS at 17:05

## 2019-06-04 RX ADMIN — OLANZAPINE 2.5 MILLIGRAM(S): 15 TABLET, FILM COATED ORAL at 05:18

## 2019-06-04 RX ADMIN — SODIUM CHLORIDE 1000 MILLILITER(S): 9 INJECTION, SOLUTION INTRAVENOUS at 21:53

## 2019-06-04 RX ADMIN — Medication 1 APPLICATION(S): at 12:44

## 2019-06-04 RX ADMIN — SODIUM CHLORIDE 1000 MILLILITER(S): 9 INJECTION, SOLUTION INTRAVENOUS at 23:53

## 2019-06-04 RX ADMIN — Medication 1 APPLICATION(S): at 12:45

## 2019-06-04 RX ADMIN — HEPARIN SODIUM 5000 UNIT(S): 5000 INJECTION INTRAVENOUS; SUBCUTANEOUS at 05:18

## 2019-06-04 RX ADMIN — CHLORHEXIDINE GLUCONATE 1 APPLICATION(S): 213 SOLUTION TOPICAL at 05:19

## 2019-06-04 RX ADMIN — Medication 650 MILLIGRAM(S): at 13:10

## 2019-06-04 RX ADMIN — OLANZAPINE 2.5 MILLIGRAM(S): 15 TABLET, FILM COATED ORAL at 18:03

## 2019-06-04 RX ADMIN — Medication 650 MILLIGRAM(S): at 18:32

## 2019-06-04 RX ADMIN — Medication 650 MILLIGRAM(S): at 18:02

## 2019-06-04 RX ADMIN — PIPERACILLIN AND TAZOBACTAM 25 GRAM(S): 4; .5 INJECTION, POWDER, LYOPHILIZED, FOR SOLUTION INTRAVENOUS at 01:36

## 2019-06-04 NOTE — PROGRESS NOTE ADULT - ASSESSMENT
#) Right lower extremity cellulitis  - wound looked non infected but warm on palpation with clear discharge, will treat as cellulitis for now  - ID consult, vascular consult  - Burn consult appreciated  - not clinically concerning for NF, lactate 1.8, /62, non-tender on flexion and gentle palpation, and patient mental status maintained. Callback requested with read on Xray.   - start on Zosyn based on previous sensitivity  - blood cultures pending  - check duplex for possible dvt    #) PAD:  - c/w asa, statin  - Vascular consult if recommended by Burn  - c/w gabapentin for neuroapthy    #) HTN:  - switch valsartan to losartan    From home  dvt ppx with sqh  dash diet #) Right lower extremity cellulitis  - not septic on presentation  - wound looked non infected but warm on palpation with clear discharge, will treat as cellulitis for now  - ID consult placed, burn already following  - Burn consult appreciated  - not clinically concerning for NF, lactate 1.8, /62, non-tender on flexion and gentle palpation, and patient mental status maintained. Callback requested with read on Xray.   - start on Zosyn based on previous sensitivity  - blood cultures pending  - check duplex for possible dvt    #) PAD:  - c/w asa, statin  - Vascular consult if recommended by Burn  - c/w gabapentin for neuroapthy    #) HTN:  - switch valsartan to losartan    From home  dvt ppx with sqh  dash diet #) Right lower extremity cellulitis  - not septic on presentation  - wound looked non infected but warm on palpation with clear discharge, will treat as cellulitis for now  - ID consult placed, burn already following  - currently on Zosyn  - blood cultures pending  - F/u duplex lower ext    #) Hyponatremia  - f/u bmp in AM    #) Elevated alk phos  - trend    #) PAD:  - c/w asa, statin  - c/w gabapentin for neuroapthy    #) HTN:  - switch valsartan to losartan    From home  dvt ppx with sqh  dash diet #) left lower extremity cellulitis  - not septic on presentation  - wound looked non infected but warm on palpation with clear discharge, will treat as cellulitis for now  - ID consult placed, burn already following  - currently on Zosyn  - blood cultures pending  - F/u duplex lower ext    #) Hyponatremia  - f/u bmp in AM    #) Elevated alk phos  - trend    #) PAD:  - c/w asa, statin  - c/w gabapentin for neuroapthy    #) HTN:  - switch valsartan to losartan    From home  dvt ppx with sqh  dash diet

## 2019-06-04 NOTE — CONSULT NOTE ADULT - SUBJECTIVE AND OBJECTIVE BOX
pt well known to burn service with chronic and recurrent left lower leg venous stasis ulcer treated with santyl and dakins compression and elevation     PE: left lower leg--> [id number] full thickness wound left lower leg with edema

## 2019-06-04 NOTE — CONSULT NOTE ADULT - ASSESSMENT
chronic left lower leg venous stasis ulcer--> pt noncompliant with elevation and compression    rec:  soap and water qd, santyl ointment and dakins solution wet to dry dressing change 1-2x a day, ace wrap from toes to below knee, elevation    no surgery neeeded at this time

## 2019-06-04 NOTE — CONSULT NOTE ADULT - ASSESSMENT
ASSESSMENT  86 yo F wth PVD, HTN, chronic stasis ulcer, multiple debridement by Burn for chronic LLE wound , last culture with pseudomonas with plan to take PO levaquin/flagyl to end 5/6 p/w 2 days fever and chills.    PROBLEMS  Sepsis ruled out on admission  4/23 wcx pseudomonas and coNS   chronic illness with mild progression   Hyponatremia , resolved    RECOMMENDATIONS  - ***  - Burn following     Spectra [phone] ASSESSMENT  86 yo F wth PVD, HTN, chronic stasis ulcer, multiple debridement by Burn for chronic LLE wound , last culture with pseudomonas with plan to take PO levaquin/flagyl to end 5/6 p/w 2 days fever and chills.  Vanc allergy (flushing, likely red person and not true allergy)    PROBLEMS  Sepsis ruled out on admission, now febrile tm 100.8 suspected LLE cellulitis   4/23 wcx pseudomonas and coNS   chronic illness with mild progression   Hyponatremia , resolved    RECOMMENDATIONS  - f/u blood cultures   - zosyn 3.375 q6h  - add linezolid 600mg q12h iv  - esr, crp  - alk ph elevated, send GGT, no e/o cholecystitis on exam   - Burn following     Spectra [phone]

## 2019-06-04 NOTE — CONSULT NOTE ADULT - SUBJECTIVE AND OBJECTIVE BOX
KALI ANDERS  85y, Female  Allergy: vancomycin (Flushing)      CHIEF COMPLAINT: fever and chills x 2 days (2019 06:27)      HPI:  84 yo F wth PVD, HTN, chronic stasis ulcer, multiple debridement by Burn p/w 2 days fever and chills. Patient was following Dr Kurtz after being discharged from hospital  then from past 2 days she was having fever with chills and some watery discharge from the right foot wound. She didn't c/o extreme pain in the right leg, no trauma reported.  tried to contact Dr Kurtz but since he was on vacation he decided to bring her to hospital. (2019 23:59)    FAMILY HISTORY:    PAST MEDICAL & SURGICAL HISTORY:  PVD (peripheral vascular disease)  Skin ulcer of left ankle, limited to breakdown of skin  Leg swelling  HTN (hypertension)  History of hip replacement      SOCIAL HISTORY    Substance Use (  x) never used  (  ) IVDU (  ) Other:  Tobacco Usage:  ( x  ) never smoked   (   ) former smoker   (   ) current smoker   Alcohol Usage: (   ) social  (   ) daily use (  x ) denies  Sexual History: na      ROS  General: as above  HEENT: Denies headache, rhinorrhea, sore throat, eye pain  CV: Denies CP, palpitations  PULM: Denies SOB, cough  GI: Denies abdominal pain, diarrhea  : Denies dysuria, hematuria  MSK: Denies arthralgias  SKIN: Denies rash  NEURO: Denies paresthesias, weakness  PSYCH: Denies depression    VITALS:  T(F): 100.8, Max: 100.8 (19 @ 13:10)  HR: 111  BP: 158/77  RR: 18Vital Signs Last 24 Hrs  T(C): 38.2 (2019 13:10), Max: 38.2 (2019 13:10)  T(F): 100.8 (2019 13:10), Max: 100.8 (2019 13:10)  HR: 111 (2019 13:10) (68 - 111)  BP: 158/77 (2019 13:10) (124/62 - 158/77)  BP(mean): --  RR: 18 (2019 13:10) (18 - 18)  SpO2: 97% (2019 23:44) (97% - 98%)    PHYSICAL EXAM:  Gen: NAD, resting in bed  HEENT: Normocephalic, atraumatic  Neck: supple, no lymphadenopathy  CV: Regular rate & regular rhythm  Lungs: decreased BS at bases, no fremitus  Abdomen: Soft, BS present  Ext: Warm, well perfused  Neuro: non focal, awake  Skin: no rash, no erythema    TESTS & MEASUREMENTS:                        10.2   6.13  )-----------( 421      ( 2019 08:31 )             32.2         137  |  99  |  14  ----------------------------<  144<H>  4.2   |  23  |  0.7    Ca    8.5      2019 08:31    TPro  6.0  /  Alb  3.0<L>  /  TBili  0.4  /  DBili  x   /  AST  13  /  ALT  10  /  AlkPhos  175<H>      eGFR if Non African American: 79 mL/min/1.73M2 (19 @ 08:31)  eGFR if : 92 mL/min/1.73M2 (19 @ 08:31)  eGFR if Non African American: 58 mL/min/1.73M2 (19 @ 20:30)  eGFR if : 68 mL/min/1.73M2 (19 @ 20:30)    LIVER FUNCTIONS - ( 2019 08:31 )  Alb: 3.0 g/dL / Pro: 6.0 g/dL / ALK PHOS: 175 U/L / ALT: 10 U/L / AST: 13 U/L / GGT: x           Urinalysis Basic - ( 2019 09:00 )    Color: Yellow / Appearance: Clear / S.025 / pH: x  Gluc: x / Ketone: Negative  / Bili: Negative / Urobili: 0.2 mg/dL   Blood: x / Protein: Trace mg/dL / Nitrite: Negative   Leuk Esterase: Negative / RBC: x / WBC 3-5 /HPF   Sq Epi: x / Non Sq Epi: Occasional /HPF / Bacteria: Few /HPF          Lactate, Blood: 1.8 mmol/L (19 @ 08:31)  Blood Gas Venous - Lactate: 1.8 mmoL/L (19 @ 20:48)      INFECTIOUS DISEASES TESTING      RADIOLOGY & ADDITIONAL TESTS:  I have personally reviewed the last Chest xray  CXR      CT      CARDIOLOGY TESTING  12 Lead ECG:   Ventricular Rate 80 BPM    Atrial Rate 80 BPM    P-R Interval 208 ms    QRS Duration 80 ms    Q-T Interval 356 ms    QTC Calculation(Bezet) 410 ms    P Axis 70 degrees    R Axis -18 degrees    T Axis 14 degrees    Diagnosis Line Normal sinus rhythm  Septal infarct , age undetermined  Inferior infarct , age undetermined  Abnormal ECG    Confirmed by VICENTE SEN MD ([id number]) on 6/3/2019 10:54:28 PM (19 @ 20:34)      MEDICATIONS  acetaminophen   Tablet .. 650  aspirin enteric coated 81  chlorhexidine 4% Liquid 1  collagenase Ointment 1  collagenase Ointment 1  Dakins Solution - Full Strength 1  docusate sodium 100  gabapentin 300  heparin  Injectable 5000  losartan 25  OLANZapine 2.5  piperacillin/tazobactam IVPB. 3.375  senna 2  simvastatin 20      ANTIBIOTICS:  piperacillin/tazobactam IVPB. 3.375 Gram(s) IV Intermittent every 8 hours KALI ANDERS  85y, Female  Allergy: vancomycin (Flushing)      CHIEF COMPLAINT: fever and chills x 2 days (2019 06:27)      HPI:  86 yo F wth PVD, HTN, chronic stasis ulcer, multiple debridement by Burn p/w 2 days fever and chills. Patient was following Dr Kurtz after being discharged from hospital  then from past 2 days she was having fever with chills and some watery discharge from the right foot wound. She didn't c/o extreme pain in the right leg, no trauma reported.  tried to contact Dr Kurtz but since he was on vacation he decided to bring her to hospital. (2019 23:59)    FAMILY HISTORY:    PAST MEDICAL & SURGICAL HISTORY:  PVD (peripheral vascular disease)  Skin ulcer of left ankle, limited to breakdown of skin  Leg swelling  HTN (hypertension)  History of hip replacement      SOCIAL HISTORY    Substance Use (  x) never used  (  ) IVDU (  ) Other:  Tobacco Usage:  ( x  ) never smoked   (   ) former smoker   (   ) current smoker   Alcohol Usage: (   ) social  (   ) daily use (  x ) denies  Sexual History: na      ROS  General: as above  HEENT: Denies headache, rhinorrhea, sore throat, eye pain  CV: Denies CP, palpitations  PULM: Denies SOB, cough  GI: Denies abdominal pain, diarrhea  : Denies dysuria, hematuria  MSK: Denies arthralgias  SKIN: Denies rash  NEURO: Denies paresthesias, weakness  PSYCH: Denies depression    VITALS:  T(F): 100.8, Max: 100.8 (19 @ 13:10)  HR: 111  BP: 158/77  RR: 18Vital Signs Last 24 Hrs  T(C): 38.2 (2019 13:10), Max: 38.2 (2019 13:10)  T(F): 100.8 (2019 13:10), Max: 100.8 (2019 13:10)  HR: 111 (2019 13:10) (68 - 111)  BP: 158/77 (2019 13:10) (124/62 - 158/77)  BP(mean): --  RR: 18 (2019 13:10) (18 - 18)  SpO2: 97% (2019 23:44) (97% - 98%)    PHYSICAL EXAM:  Gen: NAD, resting in bed appears uncomfortable   HEENT: Normocephalic, atraumatic  Neck: supple, no lymphadenopathy  CV: Regular rate & regular rhythm  Lungs: decreased BS at bases, no fremitus  Abdomen: Soft, BS present  Ext: Warm, well perfused, LLE erythema, edema, TTP, ulcer on medial malleolus and posteriorly, no purulence  Neuro: non focal, awake  Skin: as above    TESTS & MEASUREMENTS:                        10.2   6.13  )-----------( 421      ( 2019 08:31 )             32.2         137  |  99  |  14  ----------------------------<  144<H>  4.2   |  23  |  0.7    Ca    8.5      2019 08:31    TPro  6.0  /  Alb  3.0<L>  /  TBili  0.4  /  DBili  x   /  AST  13  /  ALT  10  /  AlkPhos  175<H>      eGFR if Non African American: 79 mL/min/1.73M2 (19 @ 08:31)  eGFR if : 92 mL/min/1.73M2 (19 @ 08:31)  eGFR if Non African American: 58 mL/min/1.73M2 (19 @ 20:30)  eGFR if : 68 mL/min/1.73M2 (19 @ 20:30)    LIVER FUNCTIONS - ( 2019 08:31 )  Alb: 3.0 g/dL / Pro: 6.0 g/dL / ALK PHOS: 175 U/L / ALT: 10 U/L / AST: 13 U/L / GGT: x           Urinalysis Basic - ( 2019 09:00 )    Color: Yellow / Appearance: Clear / S.025 / pH: x  Gluc: x / Ketone: Negative  / Bili: Negative / Urobili: 0.2 mg/dL   Blood: x / Protein: Trace mg/dL / Nitrite: Negative   Leuk Esterase: Negative / RBC: x / WBC 3-5 /HPF   Sq Epi: x / Non Sq Epi: Occasional /HPF / Bacteria: Few /HPF          Lactate, Blood: 1.8 mmol/L (19 @ 08:31)  Blood Gas Venous - Lactate: 1.8 mmoL/L (19 @ 20:48)      INFECTIOUS DISEASES TESTING      RADIOLOGY & ADDITIONAL TESTS:  I have personally reviewed the last Chest xray  CXR      CT      CARDIOLOGY TESTING  12 Lead ECG:   Ventricular Rate 80 BPM    Atrial Rate 80 BPM    P-R Interval 208 ms    QRS Duration 80 ms    Q-T Interval 356 ms    QTC Calculation(Bezet) 410 ms    P Axis 70 degrees    R Axis -18 degrees    T Axis 14 degrees    Diagnosis Line Normal sinus rhythm  Septal infarct , age undetermined  Inferior infarct , age undetermined  Abnormal ECG    Confirmed by VICENTE SEN MD ([id number]) on 6/3/2019 10:54:28 PM (19 @ 20:34)      MEDICATIONS  acetaminophen   Tablet .. 650  aspirin enteric coated 81  chlorhexidine 4% Liquid 1  collagenase Ointment 1  collagenase Ointment 1  Dakins Solution - Full Strength 1  docusate sodium 100  gabapentin 300  heparin  Injectable 5000  losartan 25  OLANZapine 2.5  piperacillin/tazobactam IVPB. 3.375  senna 2  simvastatin 20      ANTIBIOTICS:  piperacillin/tazobactam IVPB. 3.375 Gram(s) IV Intermittent every 8 hours        Allergies    vancomycin (Flushing)    Intolerances

## 2019-06-04 NOTE — PROGRESS NOTE ADULT - SUBJECTIVE AND OBJECTIVE BOX
SUBJECTIVE:    Patient is a 85y old Female who presents with a chief complaint of fever and chills x 2 days (03 Jun 2019 23:59)    Stable, no acute events.    PAST MEDICAL & SURGICAL HISTORY  PVD (peripheral vascular disease)  Skin ulcer of left ankle, limited to breakdown of skin  Leg swelling  HTN (hypertension)  History of hip replacement       ALLERGIES:  vancomycin (Flushing)    MEDICATIONS:  STANDING MEDICATIONS  acetaminophen   Tablet .. 650 milliGRAM(s) Oral every 6 hours  aspirin enteric coated 81 milliGRAM(s) Oral daily  chlorhexidine 4% Liquid 1 Application(s) Topical <User Schedule>  collagenase Ointment 1 Application(s) Topical daily  docusate sodium 100 milliGRAM(s) Oral daily  gabapentin 300 milliGRAM(s) Oral at bedtime  heparin  Injectable 5000 Unit(s) SubCutaneous every 8 hours  losartan 25 milliGRAM(s) Oral daily  OLANZapine 2.5 milliGRAM(s) Oral two times a day  piperacillin/tazobactam IVPB. 3.375 Gram(s) IV Intermittent every 8 hours  senna 2 Tablet(s) Oral at bedtime  simvastatin 20 milliGRAM(s) Oral at bedtime    PRN MEDICATIONS    VITALS:   T(F): 99.2  HR: 75  BP: 127/60  RR: 18  SpO2: 97%    LABS:                        9.9    7.30  )-----------( 374      ( 03 Jun 2019 20:30 )             31.4     06-03    133<L>  |  95<L>  |  16  ----------------------------<  152<H>  4.7   |  24  |  0.9    Ca    8.4<L>      03 Jun 2019 20:30    TPro  5.9<L>  /  Alb  3.0<L>  /  TBili  0.3  /  DBili  x   /  AST  19  /  ALT  14  /  AlkPhos  176<H>  06-03    PT/INR - ( 03 Jun 2019 20:30 )   PT: 12.80 sec;   INR: 1.11 ratio         PTT - ( 03 Jun 2019 20:30 )  PTT:28.9 sec                      PHYSICAL EXAM:  GEN: NAD, comfortable  LUNGS: CTAB, no w/r/r  HEART: RRR, no m/r/g  ABD: soft, NT/ND, +BS  EXT: no edema, PP b/l  NEURO: AAOx3 SUBJECTIVE:    Patient is a 85y old Female who presents with a chief complaint of fever and chills x 2 days (03 Jun 2019 23:59)    This morning patient reports feeling good. Some pain in her lower extremities at wound site. Denies any fever, chills, tremor overnight.     PAST MEDICAL & SURGICAL HISTORY  PVD (peripheral vascular disease)  Skin ulcer of left ankle, limited to breakdown of skin  Leg swelling  HTN (hypertension)  History of hip replacement       ALLERGIES:  vancomycin (Flushing)    MEDICATIONS:  STANDING MEDICATIONS  acetaminophen   Tablet .. 650 milliGRAM(s) Oral every 6 hours  aspirin enteric coated 81 milliGRAM(s) Oral daily  chlorhexidine 4% Liquid 1 Application(s) Topical <User Schedule>  collagenase Ointment 1 Application(s) Topical daily  docusate sodium 100 milliGRAM(s) Oral daily  gabapentin 300 milliGRAM(s) Oral at bedtime  heparin  Injectable 5000 Unit(s) SubCutaneous every 8 hours  losartan 25 milliGRAM(s) Oral daily  OLANZapine 2.5 milliGRAM(s) Oral two times a day  piperacillin/tazobactam IVPB. 3.375 Gram(s) IV Intermittent every 8 hours  senna 2 Tablet(s) Oral at bedtime  simvastatin 20 milliGRAM(s) Oral at bedtime    PRN MEDICATIONS    VITALS:   T(F): 99.2  HR: 75  BP: 127/60  RR: 18  SpO2: 97%    LABS:                        9.9    7.30  )-----------( 374      ( 03 Jun 2019 20:30 )             31.4     06-03    133<L>  |  95<L>  |  16  ----------------------------<  152<H>  4.7   |  24  |  0.9    Ca    8.4<L>      03 Jun 2019 20:30    TPro  5.9<L>  /  Alb  3.0<L>  /  TBili  0.3  /  DBili  x   /  AST  19  /  ALT  14  /  AlkPhos  176<H>  06-03    PT/INR - ( 03 Jun 2019 20:30 )   PT: 12.80 sec;   INR: 1.11 ratio         PTT - ( 03 Jun 2019 20:30 )  PTT:28.9 sec                      PHYSICAL EXAM:  GEN: NAD, comfortable  LUNGS: CTAB, no w/r/r  HEART: RRR, no m/r/g  ABD: soft, NT/ND, +BS  EXT: bilateral lower ext grossly swollen, ankle/feet wrapped in kerlix  NEURO: AAOx3

## 2019-06-05 ENCOUNTER — TRANSCRIPTION ENCOUNTER (OUTPATIENT)
Age: 84
End: 2019-06-05

## 2019-06-05 LAB
CULTURE RESULTS: NO GROWTH — SIGNIFICANT CHANGE UP
SPECIMEN SOURCE: SIGNIFICANT CHANGE UP

## 2019-06-05 RX ORDER — ZOLPIDEM TARTRATE 10 MG/1
5 TABLET ORAL ONCE
Refills: 0 | Status: DISCONTINUED | OUTPATIENT
Start: 2019-06-05 | End: 2019-06-05

## 2019-06-05 RX ORDER — SODIUM CHLORIDE 9 MG/ML
1000 INJECTION, SOLUTION INTRAVENOUS
Refills: 0 | Status: DISCONTINUED | OUTPATIENT
Start: 2019-06-05 | End: 2019-06-05

## 2019-06-05 RX ORDER — IBUPROFEN 200 MG
400 TABLET ORAL ONCE
Refills: 0 | Status: COMPLETED | OUTPATIENT
Start: 2019-06-05 | End: 2019-06-05

## 2019-06-05 RX ORDER — BACITRACIN ZINC 500 UNIT/G
1 OINTMENT IN PACKET (EA) TOPICAL
Refills: 0 | Status: DISCONTINUED | OUTPATIENT
Start: 2019-06-05 | End: 2019-06-06

## 2019-06-05 RX ORDER — ZALEPLON 10 MG
5 CAPSULE ORAL ONCE
Refills: 0 | Status: DISCONTINUED | OUTPATIENT
Start: 2019-06-05 | End: 2019-06-05

## 2019-06-05 RX ADMIN — Medication 650 MILLIGRAM(S): at 03:56

## 2019-06-05 RX ADMIN — Medication 1 APPLICATION(S): at 12:17

## 2019-06-05 RX ADMIN — PIPERACILLIN AND TAZOBACTAM 25 GRAM(S): 4; .5 INJECTION, POWDER, LYOPHILIZED, FOR SOLUTION INTRAVENOUS at 12:17

## 2019-06-05 RX ADMIN — PIPERACILLIN AND TAZOBACTAM 25 GRAM(S): 4; .5 INJECTION, POWDER, LYOPHILIZED, FOR SOLUTION INTRAVENOUS at 18:12

## 2019-06-05 RX ADMIN — Medication 650 MILLIGRAM(S): at 18:14

## 2019-06-05 RX ADMIN — OLANZAPINE 2.5 MILLIGRAM(S): 15 TABLET, FILM COATED ORAL at 05:43

## 2019-06-05 RX ADMIN — SIMVASTATIN 20 MILLIGRAM(S): 20 TABLET, FILM COATED ORAL at 22:00

## 2019-06-05 RX ADMIN — Medication 400 MILLIGRAM(S): at 20:42

## 2019-06-05 RX ADMIN — Medication 1 APPLICATION(S): at 22:01

## 2019-06-05 RX ADMIN — Medication 81 MILLIGRAM(S): at 12:16

## 2019-06-05 RX ADMIN — LINEZOLID 300 MILLIGRAM(S): 600 INJECTION, SOLUTION INTRAVENOUS at 18:13

## 2019-06-05 RX ADMIN — Medication 650 MILLIGRAM(S): at 12:45

## 2019-06-05 RX ADMIN — HEPARIN SODIUM 5000 UNIT(S): 5000 INJECTION INTRAVENOUS; SUBCUTANEOUS at 22:00

## 2019-06-05 RX ADMIN — Medication 1 APPLICATION(S): at 22:14

## 2019-06-05 RX ADMIN — HEPARIN SODIUM 5000 UNIT(S): 5000 INJECTION INTRAVENOUS; SUBCUTANEOUS at 13:23

## 2019-06-05 RX ADMIN — PIPERACILLIN AND TAZOBACTAM 25 GRAM(S): 4; .5 INJECTION, POWDER, LYOPHILIZED, FOR SOLUTION INTRAVENOUS at 00:06

## 2019-06-05 RX ADMIN — LINEZOLID 300 MILLIGRAM(S): 600 INJECTION, SOLUTION INTRAVENOUS at 05:42

## 2019-06-05 RX ADMIN — GABAPENTIN 300 MILLIGRAM(S): 400 CAPSULE ORAL at 22:00

## 2019-06-05 RX ADMIN — SODIUM CHLORIDE 100 MILLILITER(S): 9 INJECTION, SOLUTION INTRAVENOUS at 03:06

## 2019-06-05 RX ADMIN — CHLORHEXIDINE GLUCONATE 1 APPLICATION(S): 213 SOLUTION TOPICAL at 05:43

## 2019-06-05 RX ADMIN — OLANZAPINE 2.5 MILLIGRAM(S): 15 TABLET, FILM COATED ORAL at 22:13

## 2019-06-05 RX ADMIN — Medication 650 MILLIGRAM(S): at 12:15

## 2019-06-05 RX ADMIN — PIPERACILLIN AND TAZOBACTAM 25 GRAM(S): 4; .5 INJECTION, POWDER, LYOPHILIZED, FOR SOLUTION INTRAVENOUS at 23:54

## 2019-06-05 RX ADMIN — Medication 400 MILLIGRAM(S): at 21:12

## 2019-06-05 RX ADMIN — Medication 650 MILLIGRAM(S): at 04:26

## 2019-06-05 RX ADMIN — Medication 650 MILLIGRAM(S): at 00:09

## 2019-06-05 RX ADMIN — PIPERACILLIN AND TAZOBACTAM 25 GRAM(S): 4; .5 INJECTION, POWDER, LYOPHILIZED, FOR SOLUTION INTRAVENOUS at 06:57

## 2019-06-05 RX ADMIN — SENNA PLUS 2 TABLET(S): 8.6 TABLET ORAL at 22:00

## 2019-06-05 RX ADMIN — Medication 650 MILLIGRAM(S): at 00:39

## 2019-06-05 RX ADMIN — ZOLPIDEM TARTRATE 5 MILLIGRAM(S): 10 TABLET ORAL at 22:02

## 2019-06-05 RX ADMIN — Medication 100 MILLIGRAM(S): at 12:18

## 2019-06-05 NOTE — DIETITIAN INITIAL EVALUATION ADULT. - ORAL INTAKE PTA
Pt with fair appetite and PO intake PTA. Consumes 3 meals/day with occasional snacks. Reports age related decrease in appetite but still consumes regular meal pattern. NKFA. Denies use of nutrition supplements/fair

## 2019-06-05 NOTE — PROGRESS NOTE ADULT - ASSESSMENT
rle cellulitis  venous stasis ulcer  sepsis not poa  hyponatremia  pad      contnue iv abx  id fu  fu cx  burn fu   continue current rx

## 2019-06-05 NOTE — PROGRESS NOTE ADULT - ASSESSMENT
ASSESSMENT  86 yo F wth PVD, HTN, chronic stasis ulcer, multiple debridement by Burn for chronic LLE wound , last culture with pseudomonas with plan to take PO levaquin/flagyl to end 5/6 p/w 2 days fever and chills.  Vanc allergy (flushing, likely red person and not true allergy)    PROBLEMS  #Sepsis ruled out on admission, now febrile suspected LLE cellulitis   4/23 wcx pseudomonas and coNS   chronic illness with mild progression   BCX NG  Duplex no DVT  #Hyponatremia , resolved    RECOMMENDATIONS  - zosyn 3.375 q6h  - linezolid 600mg q12h iv  - When afebrile 24h, likely D/C tomorrow on PO levofloxacin 500mg q24h (crcl 72) and doxy 100mg BID to complete 7 days   - Burn following     Spectra [id number]

## 2019-06-05 NOTE — PROGRESS NOTE ADULT - SUBJECTIVE AND OBJECTIVE BOX
SUBJECTIVE:    Patient is a 85y old Female who presents with a chief complaint of fever and chills x 2 days (2019 15:22)    Overnight she became hypotensive with BP of 80/41. Was given 2 liters of LR bolus. BP this am better. Overnight she reports night sweats. No fever this am, right leg still painful also reports back pain.    PAST MEDICAL & SURGICAL HISTORY  PVD (peripheral vascular disease)  Skin ulcer of left ankle, limited to breakdown of skin  Leg swelling  HTN (hypertension)  History of hip replacement       ALLERGIES:  vancomycin (Flushing)    MEDICATIONS:  STANDING MEDICATIONS  acetaminophen   Tablet .. 650 milliGRAM(s) Oral every 6 hours  aspirin enteric coated 81 milliGRAM(s) Oral daily  chlorhexidine 4% Liquid 1 Application(s) Topical <User Schedule>  collagenase Ointment 1 Application(s) Topical two times a day  collagenase Ointment 1 Application(s) Topical daily  Dakins Solution - Full Strength 1 Application(s) Topical daily  docusate sodium 100 milliGRAM(s) Oral daily  gabapentin 300 milliGRAM(s) Oral at bedtime  heparin  Injectable 5000 Unit(s) SubCutaneous every 8 hours  lactated ringers. 1000 milliLiter(s) IV Continuous <Continuous>  linezolid  IVPB      linezolid  IVPB 600 milliGRAM(s) IV Intermittent every 12 hours  OLANZapine 2.5 milliGRAM(s) Oral two times a day  piperacillin/tazobactam IVPB. 3.375 Gram(s) IV Intermittent every 6 hours  senna 2 Tablet(s) Oral at bedtime  simvastatin 20 milliGRAM(s) Oral at bedtime    PRN MEDICATIONS    VITALS:   T(F): 97.8  HR: 89  BP: 131/67  RR: 18  SpO2: 98%    LABS:                        10.2   6.13  )-----------( 421      ( 2019 08:31 )             32.2     06-    137  |  99  |  14  ----------------------------<  144<H>  4.2   |  23  |  0.7    Ca    8.5      2019 08:31    TPro  6.0  /  Alb  3.0<L>  /  TBili  0.4  /  DBili  x   /  AST  13  /  ALT  10  /  AlkPhos  175<H>  06-04    PT/INR - ( 2019 20:30 )   PT: 12.80 sec;   INR: 1.11 ratio         PTT - ( 2019 20:30 )  PTT:28.9 sec  Urinalysis Basic - ( 2019 09:00 )    Color: Yellow / Appearance: Clear / S.025 / pH: x  Gluc: x / Ketone: Negative  / Bili: Negative / Urobili: 0.2 mg/dL   Blood: x / Protein: Trace mg/dL / Nitrite: Negative   Leuk Esterase: Negative / RBC: x / WBC 3-5 /HPF   Sq Epi: x / Non Sq Epi: Occasional /HPF / Bacteria: Few /HPF            Culture - Blood (collected 2019 20:30)  Source: .Blood Blood-Peripheral  Preliminary Report (2019 07:00):    No growth to date.    Culture - Blood (collected 2019 20:30)  Source: .Blood Blood-Peripheral  Preliminary Report (2019 07:00):    No growth to date.              19 @ 07:01  -  19 @ 07:00  --------------------------------------------------------  IN: 1000 mL / OUT: 650 mL / NET: 350 mL          PHYSICAL EXAM:  GEN: NAD, comfortable  LUNGS: CTAB, no w/r/r  HEART: RRR, no m/r/g  ABD: soft, +BS  EXT: leg tender to touch and with movement, warm to touch, medial ankle ulcer which has no purulence   NEURO: alert and orientated currently but occasionally does become confused SUBJECTIVE:    Patient is a 85y old Female who presents with a chief complaint of fever and chills x 2 days (2019 15:22)    Overnight she became hypotensive with BP of 80/41. Was given 2 liters of LR bolus. BP this am better. Overnight she reports night sweats. No fever this am, right leg still painful also reports back pain.    PAST MEDICAL & SURGICAL HISTORY  PVD (peripheral vascular disease)  Skin ulcer of left ankle, limited to breakdown of skin  Leg swelling  HTN (hypertension)  History of hip replacement       ALLERGIES:  vancomycin (Flushing)    MEDICATIONS:  STANDING MEDICATIONS  acetaminophen   Tablet .. 650 milliGRAM(s) Oral every 6 hours  aspirin enteric coated 81 milliGRAM(s) Oral daily  chlorhexidine 4% Liquid 1 Application(s) Topical <User Schedule>  collagenase Ointment 1 Application(s) Topical two times a day  collagenase Ointment 1 Application(s) Topical daily  Dakins Solution - Full Strength 1 Application(s) Topical daily  docusate sodium 100 milliGRAM(s) Oral daily  gabapentin 300 milliGRAM(s) Oral at bedtime  heparin  Injectable 5000 Unit(s) SubCutaneous every 8 hours  lactated ringers. 1000 milliLiter(s) IV Continuous <Continuous>  linezolid  IVPB      linezolid  IVPB 600 milliGRAM(s) IV Intermittent every 12 hours  OLANZapine 2.5 milliGRAM(s) Oral two times a day  piperacillin/tazobactam IVPB. 3.375 Gram(s) IV Intermittent every 6 hours  senna 2 Tablet(s) Oral at bedtime  simvastatin 20 milliGRAM(s) Oral at bedtime    PRN MEDICATIONS    VITALS:   T(F): 97.8  HR: 89  BP: 131/67  RR: 18  SpO2: 98%    LABS:                        10.2   6.13  )-----------( 421      ( 2019 08:31 )             32.2     06-    137  |  99  |  14  ----------------------------<  144<H>  4.2   |  23  |  0.7    Ca    8.5      2019 08:31    TPro  6.0  /  Alb  3.0<L>  /  TBili  0.4  /  DBili  x   /  AST  13  /  ALT  10  /  AlkPhos  175<H>  06-04    PT/INR - ( 2019 20:30 )   PT: 12.80 sec;   INR: 1.11 ratio         PTT - ( 2019 20:30 )  PTT:28.9 sec  Urinalysis Basic - ( 2019 09:00 )    Color: Yellow / Appearance: Clear / S.025 / pH: x  Gluc: x / Ketone: Negative  / Bili: Negative / Urobili: 0.2 mg/dL   Blood: x / Protein: Trace mg/dL / Nitrite: Negative   Leuk Esterase: Negative / RBC: x / WBC 3-5 /HPF   Sq Epi: x / Non Sq Epi: Occasional /HPF / Bacteria: Few /HPF            Culture - Blood (collected 2019 20:30)  Source: .Blood Blood-Peripheral  Preliminary Report (2019 07:00):    No growth to date.    Culture - Blood (collected 2019 20:30)  Source: .Blood Blood-Peripheral  Preliminary Report (2019 07:00):    No growth to date.              19 @ 07:01  -  19 @ 07:00  --------------------------------------------------------  IN: 1000 mL / OUT: 650 mL / NET: 350 mL          PHYSICAL EXAM:  GEN: NAD, comfortable  LUNGS: CTAB, no w/r/r  HEART: RRR, no m/r/g  ABD: soft, +BS  EXT: left leg tender to touch and with movement, warm to touch, medial ankle ulcer which has no purulence   NEURO: alert and orientated currently but occasionally does become confused

## 2019-06-05 NOTE — PROGRESS NOTE ADULT - ASSESSMENT
#) Right lower extremity cellulitis  - not septic on presentation, but septic yesterday with fever, tachycardia  - currently on Zosyn and added zyvox yesterday  - blood cultures pending  - F/u duplex lower ext    #) Hyponatremia  - f/u bmp in AM    #) Elevated alk phos  - trend    #) PAD:  - c/w asa, statin  - c/w gabapentin for neuroapthy    #) HTN:  - switch valsartan to losartan    From home  dvt ppx with sqh  dash diet #) left lower extremity cellulitis  - not septic on presentation, but septic yesterday with fever, tachycardia  - currently on Zosyn and added zyvox yesterday  - blood cultures pending  - F/u duplex lower ext    #) Hyponatremia  - f/u bmp in AM    #) Elevated alk phos  - trend    #) PAD:  - c/w asa, statin  - c/w gabapentin for neuroapthy    #) HTN:  - switch valsartan to losartan    From home  dvt ppx with sqh  dash diet

## 2019-06-05 NOTE — PROGRESS NOTE ADULT - SUBJECTIVE AND OBJECTIVE BOX
KALI ANDERS  85y, Female  Allergy: vancomycin (Flushing)      CHIEF COMPLAINT: fever and chills x 2 days (2019 08:49)      INTERVAL EVENTS/HPI  - No acute events overnight  - T(F): , Max: 101.8 (19 @ 14:10)  - Denies any worsening symptoms  - Tolerating medication    ROS  Negative except as per noted above in HPI  Denies cough  Denies diarrhea  Denies dysuria   Denies pain at any IV site     FH noncontributory    VITALS:  T(F): 97.8, Max: 101.8 (19 @ 14:10)  HR: 89  BP: 131/67  RR: 18Vital Signs Last 24 Hrs  T(C): 36.6 (2019 05:00), Max: 38.8 (2019 14:10)  T(F): 97.8 (2019 05:00), Max: 101.8 (2019 14:10)  HR: 89 (2019 05:00) (55 - 111)  BP: 131/67 (2019 05:00) (80/41 - 158/77)  BP(mean): --  RR: 18 (2019 05:00) (18 - 18)  SpO2: 98% (2019 02:00) (98% - 100%)    PHYSICAL EXAM:  Gen: NAD, more alert today  HEENT: Normocephalic, atraumatic  Neck: supple, no lymphadenopathy  CV: Regular rate & regular rhythm  Lungs: decreased BS at bases, no fremitus  Abdomen: Soft, BS present  Ext: Warm, well perfused, LLE decreased erythema, edema, TTP, ulcer on medial malleolus and posteriorly, no purulence  Neuro: non focal, awake  Skin: as above          TESTS & MEASUREMENTS:                        10.2   6.13  )-----------( 421      ( 2019 08:31 )             32.2     06-04    137  |  99  |  14  ----------------------------<  144<H>  4.2   |  23  |  0.7    Ca    8.5      2019 08:31    TPro  6.0  /  Alb  3.0<L>  /  TBili  0.4  /  DBili  x   /  AST  13  /  ALT  10  /  AlkPhos  175<H>  06-      LIVER FUNCTIONS - ( 2019 08:31 )  Alb: 3.0 g/dL / Pro: 6.0 g/dL / ALK PHOS: 175 U/L / ALT: 10 U/L / AST: 13 U/L / GGT: x           Urinalysis Basic - ( 2019 09:00 )    Color: Yellow / Appearance: Clear / S.025 / pH: x  Gluc: x / Ketone: Negative  / Bili: Negative / Urobili: 0.2 mg/dL   Blood: x / Protein: Trace mg/dL / Nitrite: Negative   Leuk Esterase: Negative / RBC: x / WBC 3-5 /HPF   Sq Epi: x / Non Sq Epi: Occasional /HPF / Bacteria: Few /HPF        Culture - Blood (collected 19 @ 20:30)  Source: .Blood Blood-Peripheral  Preliminary Report (19 @ 07:00):    No growth to date.    Culture - Blood (collected 19 @ 20:30)  Source: .Blood Blood-Peripheral  Preliminary Report (19 @ 07:00):    No growth to date.        Lactate, Blood: 1.8 mmol/L (19 @ 08:31)  Blood Gas Venous - Lactate: 1.8 mmoL/L (19 @ 20:48)      INFECTIOUS DISEASES TESTING      RADIOLOGY & ADDITIONAL TESTS:  I have personally reviewed the last Chest xray  CXR      CT      CARDIOLOGY TESTING  12 Lead ECG:   Ventricular Rate 80 BPM    Atrial Rate 80 BPM    P-R Interval 208 ms    QRS Duration 80 ms    Q-T Interval 356 ms    QTC Calculation(Bezet) 410 ms    P Axis 70 degrees    R Axis -18 degrees    T Axis 14 degrees    Diagnosis Line Normal sinus rhythm  Septal infarct , age undetermined  Inferior infarct , age undetermined  Abnormal ECG    Confirmed by VICENTE SEN MD ([id number]) on 6/3/2019 10:54:28 PM (19 @ 20:34)      MEDICATIONS  acetaminophen   Tablet .. 650  aspirin enteric coated 81  chlorhexidine 4% Liquid 1  collagenase Ointment 1  collagenase Ointment 1  Dakins Solution - Full Strength 1  docusate sodium 100  gabapentin 300  heparin  Injectable 5000  lactated ringers. 1000  linezolid  IVPB   linezolid  IVPB 600  OLANZapine 2.5  piperacillin/tazobactam IVPB. 3.375  senna 2  simvastatin 20      ANTIBIOTICS:  linezolid  IVPB      linezolid  IVPB 600 milliGRAM(s) IV Intermittent every 12 hours  piperacillin/tazobactam IVPB. 3.375 Gram(s) IV Intermittent every 6 hours

## 2019-06-05 NOTE — DIETITIAN INITIAL EVALUATION ADULT. - DIET TYPE
DASH/TLC (sodium and cholesterol restricted diet)/Pt reports appetite suboptimal today and consuming 50% meals or less. Stating she "just doesn't feel well/like eating" Agreeable to Ensure compact. Recs at end of document and d/w LIP.

## 2019-06-05 NOTE — DISCHARGE NOTE NURSING/CASE MANAGEMENT/SOCIAL WORK - NSDCDPATPORTLINK_GEN_ALL_CORE
You can access the [facility] Portal, offered by [facility], by registering with the following website: [email or web address]

## 2019-06-05 NOTE — DIETITIAN INITIAL EVALUATION ADULT. - ENERGY NEEDS
estimated calorie needs = 6196-3866 kcal/day (25-30 kcal/kg IBW, considering BMI 30).  estimated protein needs = 55-65 g/day (1.0-1.2 g/kg IBW, reason mentioned above).   estimated fluid needs = per LIP

## 2019-06-05 NOTE — DIETITIAN INITIAL EVALUATION ADULT. - OTHER INFO
Pt p/w fever and chills x2 days with primary dx: cellulitis. RLE cellulitis, not septic on admission but septic yesterday with fever. Blood cultures pending, f/u LE duplex. As per burn, no surgery needed at this time. Hyponatremia, f/u BMP. Reason for assessment: stage II pressure ulcer noted on RD screen, however, now noted as healed.

## 2019-06-05 NOTE — PROGRESS NOTE ADULT - SUBJECTIVE AND OBJECTIVE BOX
Patient was seen and examined. Spoke with RN. Chart reviewed.  feels better,  at bedside,  continue iv abx  d/w ho    No events overnight.  Vital Signs Last 24 Hrs  T(F): 97.8 (2019 05:00), Max: 101.8 (2019 14:10)  HR: 89 (2019 05:00) (55 - 111)  BP: 131/67 (2019 05:00) (80/41 - 158/77)  SpO2: 98% (2019 02:00) (98% - 100%)  MEDICATIONS  (STANDING):  acetaminophen   Tablet .. 650 milliGRAM(s) Oral every 6 hours  aspirin enteric coated 81 milliGRAM(s) Oral daily  chlorhexidine 4% Liquid 1 Application(s) Topical <User Schedule>  collagenase Ointment 1 Application(s) Topical two times a day  collagenase Ointment 1 Application(s) Topical daily  Dakins Solution - Full Strength 1 Application(s) Topical daily  docusate sodium 100 milliGRAM(s) Oral daily  gabapentin 300 milliGRAM(s) Oral at bedtime  heparin  Injectable 5000 Unit(s) SubCutaneous every 8 hours  lactated ringers. 1000 milliLiter(s) (100 mL/Hr) IV Continuous <Continuous>  linezolid  IVPB      linezolid  IVPB 600 milliGRAM(s) IV Intermittent every 12 hours  OLANZapine 2.5 milliGRAM(s) Oral two times a day  piperacillin/tazobactam IVPB. 3.375 Gram(s) IV Intermittent every 6 hours  senna 2 Tablet(s) Oral at bedtime  simvastatin 20 milliGRAM(s) Oral at bedtime    MEDICATIONS  (PRN):    Labs:                        10.2   6.13  )-----------( 421      ( 2019 08:31 )             32.2                         9.9    7.30  )-----------( 374      ( 2019 20:30 )             31.4     2019 08:31    137    |  99     |  14     ----------------------------<  144    4.2     |  23     |  0.7    2019 20:30    133    |  95     |  16     ----------------------------<  152    4.7     |  24     |  0.9      Ca    8.5        2019 08:31  Ca    8.4        2019 20:30    TPro  6.0    /  Alb  3.0    /  TBili  0.4    /  DBili  x      /  AST  13     /  ALT  10     /  AlkPhos  175    2019 08:31  TPro  5.9    /  Alb  3.0    /  TBili  0.3    /  DBili  x      /  AST  19     /  ALT  14     /  AlkPhos  176    2019 20:30    PT/INR - ( 2019 20:30 )   PT: 12.80 sec;   INR: 1.11 ratio         PTT - ( 2019 20:30 )  PTT:28.9 sec  Urinalysis Basic - ( 2019 09:00 )    Color: Yellow / Appearance: Clear / S.025 / pH: x  Gluc: x / Ketone: Negative  / Bili: Negative / Urobili: 0.2 mg/dL   Blood: x / Protein: Trace mg/dL / Nitrite: Negative   Leuk Esterase: Negative / RBC: x / WBC 3-5 /HPF   Sq Epi: x / Non Sq Epi: Occasional /HPF / Bacteria: Few /HPF        Culture - Blood (collected 2019 20:30)  Source: .Blood Blood-Peripheral  Preliminary Report (2019 07:00):    No growth to date.    Culture - Blood (collected 2019 20:30)  Source: .Blood Blood-Peripheral  Preliminary Report (2019 07:00):    No growth to date.        Radiology:    General: comfortable, NAD  Neurology: A&Ox3, nonfocal  Head:  Normocephalic, atraumatic  ENT:  Mucosa moist, no ulcerations  Neck:  Supple, no JVD,   Resp: CTA B/L  CV: RRR, S1S2,   GI: Soft, NT, bowel sounds  MS: left foot dressing+ peripheral pulses, FROM all 4 extremity

## 2019-06-05 NOTE — DIETITIAN INITIAL EVALUATION ADULT. - PHYSICAL APPEARANCE
BMI 29.8(174#, 64in). Unsure of recent wt loss. No signs of significant muscle wasting or fat loss observed. 2+ edema b/l legs. stage II pressure ulcer to L buttocks, now noted as healed.

## 2019-06-06 ENCOUNTER — TRANSCRIPTION ENCOUNTER (OUTPATIENT)
Age: 84
End: 2019-06-06

## 2019-06-06 VITALS
SYSTOLIC BLOOD PRESSURE: 122 MMHG | TEMPERATURE: 98 F | RESPIRATION RATE: 18 BRPM | HEART RATE: 88 BPM | DIASTOLIC BLOOD PRESSURE: 58 MMHG

## 2019-06-06 LAB
ALBUMIN SERPL ELPH-MCNC: 2.9 G/DL — LOW (ref 3.5–5.2)
ALP SERPL-CCNC: 239 U/L — HIGH (ref 30–115)
ALT FLD-CCNC: 9 U/L — SIGNIFICANT CHANGE UP (ref 0–41)
ANION GAP SERPL CALC-SCNC: 13 MMOL/L — SIGNIFICANT CHANGE UP (ref 7–14)
AST SERPL-CCNC: 14 U/L — SIGNIFICANT CHANGE UP (ref 0–41)
BASOPHILS # BLD AUTO: 0.02 K/UL — SIGNIFICANT CHANGE UP (ref 0–0.2)
BASOPHILS NFR BLD AUTO: 0.3 % — SIGNIFICANT CHANGE UP (ref 0–1)
BILIRUB SERPL-MCNC: 0.4 MG/DL — SIGNIFICANT CHANGE UP (ref 0.2–1.2)
BUN SERPL-MCNC: 13 MG/DL — SIGNIFICANT CHANGE UP (ref 10–20)
CALCIUM SERPL-MCNC: 8.5 MG/DL — SIGNIFICANT CHANGE UP (ref 8.5–10.1)
CHLORIDE SERPL-SCNC: 99 MMOL/L — SIGNIFICANT CHANGE UP (ref 98–110)
CO2 SERPL-SCNC: 24 MMOL/L — SIGNIFICANT CHANGE UP (ref 17–32)
CREAT SERPL-MCNC: 0.8 MG/DL — SIGNIFICANT CHANGE UP (ref 0.7–1.5)
CRP SERPL-MCNC: 14.56 MG/DL — HIGH (ref 0–0.4)
EOSINOPHIL # BLD AUTO: 0.88 K/UL — HIGH (ref 0–0.7)
EOSINOPHIL NFR BLD AUTO: 14.2 % — HIGH (ref 0–8)
ERYTHROCYTE [SEDIMENTATION RATE] IN BLOOD: 72 MM/HR — HIGH (ref 0–20)
GGT SERPL-CCNC: 99 U/L — HIGH (ref 1–40)
GLUCOSE SERPL-MCNC: 141 MG/DL — HIGH (ref 70–99)
HCT VFR BLD CALC: 32.8 % — LOW (ref 37–47)
HGB BLD-MCNC: 10.4 G/DL — LOW (ref 12–16)
IMM GRANULOCYTES NFR BLD AUTO: 1.1 % — HIGH (ref 0.1–0.3)
LYMPHOCYTES # BLD AUTO: 0.33 K/UL — LOW (ref 1.2–3.4)
LYMPHOCYTES # BLD AUTO: 5.3 % — LOW (ref 20.5–51.1)
MAGNESIUM SERPL-MCNC: 2.3 MG/DL — SIGNIFICANT CHANGE UP (ref 1.8–2.4)
MCHC RBC-ENTMCNC: 28.9 PG — SIGNIFICANT CHANGE UP (ref 27–31)
MCHC RBC-ENTMCNC: 31.7 G/DL — LOW (ref 32–37)
MCV RBC AUTO: 91.1 FL — SIGNIFICANT CHANGE UP (ref 81–99)
MONOCYTES # BLD AUTO: 0.31 K/UL — SIGNIFICANT CHANGE UP (ref 0.1–0.6)
MONOCYTES NFR BLD AUTO: 5 % — SIGNIFICANT CHANGE UP (ref 1.7–9.3)
NEUTROPHILS # BLD AUTO: 4.6 K/UL — SIGNIFICANT CHANGE UP (ref 1.4–6.5)
NEUTROPHILS NFR BLD AUTO: 74.1 % — SIGNIFICANT CHANGE UP (ref 42.2–75.2)
NRBC # BLD: 0 /100 WBCS — SIGNIFICANT CHANGE UP (ref 0–0)
PLATELET # BLD AUTO: 400 K/UL — SIGNIFICANT CHANGE UP (ref 130–400)
POTASSIUM SERPL-MCNC: 4.6 MMOL/L — SIGNIFICANT CHANGE UP (ref 3.5–5)
POTASSIUM SERPL-SCNC: 4.6 MMOL/L — SIGNIFICANT CHANGE UP (ref 3.5–5)
PROT SERPL-MCNC: 5.6 G/DL — LOW (ref 6–8)
RBC # BLD: 3.6 M/UL — LOW (ref 4.2–5.4)
RBC # FLD: 14.6 % — HIGH (ref 11.5–14.5)
SODIUM SERPL-SCNC: 136 MMOL/L — SIGNIFICANT CHANGE UP (ref 135–146)
WBC # BLD: 6.21 K/UL — SIGNIFICANT CHANGE UP (ref 4.8–10.8)
WBC # FLD AUTO: 6.21 K/UL — SIGNIFICANT CHANGE UP (ref 4.8–10.8)

## 2019-06-06 RX ADMIN — CHLORHEXIDINE GLUCONATE 1 APPLICATION(S): 213 SOLUTION TOPICAL at 05:42

## 2019-06-06 RX ADMIN — PIPERACILLIN AND TAZOBACTAM 25 GRAM(S): 4; .5 INJECTION, POWDER, LYOPHILIZED, FOR SOLUTION INTRAVENOUS at 11:27

## 2019-06-06 RX ADMIN — PIPERACILLIN AND TAZOBACTAM 25 GRAM(S): 4; .5 INJECTION, POWDER, LYOPHILIZED, FOR SOLUTION INTRAVENOUS at 05:41

## 2019-06-06 RX ADMIN — Medication 650 MILLIGRAM(S): at 11:28

## 2019-06-06 RX ADMIN — OLANZAPINE 2.5 MILLIGRAM(S): 15 TABLET, FILM COATED ORAL at 05:42

## 2019-06-06 RX ADMIN — Medication 1 APPLICATION(S): at 05:43

## 2019-06-06 RX ADMIN — Medication 81 MILLIGRAM(S): at 11:28

## 2019-06-06 RX ADMIN — Medication 650 MILLIGRAM(S): at 05:41

## 2019-06-06 RX ADMIN — HEPARIN SODIUM 5000 UNIT(S): 5000 INJECTION INTRAVENOUS; SUBCUTANEOUS at 05:42

## 2019-06-06 RX ADMIN — Medication 650 MILLIGRAM(S): at 06:11

## 2019-06-06 RX ADMIN — Medication 1 APPLICATION(S): at 15:33

## 2019-06-06 RX ADMIN — LINEZOLID 300 MILLIGRAM(S): 600 INJECTION, SOLUTION INTRAVENOUS at 05:40

## 2019-06-06 RX ADMIN — Medication 100 MILLIGRAM(S): at 11:28

## 2019-06-06 RX ADMIN — HEPARIN SODIUM 5000 UNIT(S): 5000 INJECTION INTRAVENOUS; SUBCUTANEOUS at 15:32

## 2019-06-06 RX ADMIN — Medication 1 APPLICATION(S): at 15:32

## 2019-06-06 NOTE — PHYSICAL THERAPY INITIAL EVALUATION ADULT - GENERAL OBSERVATIONS, REHAB EVAL
Pt seen [phone] for 35 minutes. Pt encountered sitting in chair, no apparent distress, agreeable to physical therapy, + IV connected, +boyfriend present bedside.

## 2019-06-06 NOTE — PHYSICAL THERAPY INITIAL EVALUATION ADULT - IMPAIRMENTS FOUND, PT EVAL
muscle strength/gross motor/posture/poor safety awareness/ROM/gait, locomotion, and balance/integumentary integrity/aerobic capacity/endurance

## 2019-06-06 NOTE — PROGRESS NOTE ADULT - SUBJECTIVE AND OBJECTIVE BOX
KALI ANDERS  85y, Female  Allergy: vancomycin (Flushing)      CHIEF COMPLAINT: fever and chills x 2 days (06 Jun 2019 08:10)      INTERVAL EVENTS/HPI  - No acute events overnight, + chills  - T(F): , Max: 98.3 (06-05-19 @ 12:54)  - Denies any worsening symptoms  - Tolerating medication  - WBC Count: 6.21 K/uL (06-06-19 @ 08:55)      ROS  Negative except as per noted above in HPI  Denies cough  Denies diarrhea  Denies dysuria   Denies pain at any IV site     FH noncontributory    VITALS:  T(F): 97, Max: 98.3 (06-05-19 @ 12:54)  HR: 58  BP: 101/53  RR: 16Vital Signs Last 24 Hrs  T(C): 36.1 (06 Jun 2019 05:00), Max: 36.8 (05 Jun 2019 12:54)  T(F): 97 (06 Jun 2019 05:00), Max: 98.3 (05 Jun 2019 12:54)  HR: 58 (06 Jun 2019 05:00) (58 - 105)  BP: 101/53 (06 Jun 2019 05:00) (94/53 - 177/97)  BP(mean): --  RR: 16 (06 Jun 2019 05:00) (16 - 18)  SpO2: --    PHYSICAL EXAM:  Gen: NAD  HEENT: Normocephalic, atraumatic  Neck: supple, no lymphadenopathy  CV: Regular rate & regular rhythm  Lungs: decreased BS at bases, no fremitus  Abdomen: Soft, BS present  Ext: Warm, well perfused, LLE decreased erythema, edema, TTP, ulcer on medial malleolus and posteriorly, no purulence  Neuro: non focal, awake  Skin: as above        TESTS & MEASUREMENTS:                        10.4   6.21  )-----------( 400      ( 06 Jun 2019 08:55 )             32.8     06-06    136  |  99  |  13  ----------------------------<  141<H>  4.6   |  24  |  0.8    Ca    8.5      06 Jun 2019 08:55  Mg     2.3     06-06    TPro  5.6<L>  /  Alb  2.9<L>  /  TBili  0.4  /  DBili  x   /  AST  14  /  ALT  9   /  AlkPhos  239<H>  06-06    eGFR if Non African American: 67 mL/min/1.73M2 (06-06-19 @ 08:55)  eGFR if African American: 78 mL/min/1.73M2 (06-06-19 @ 08:55)    LIVER FUNCTIONS - ( 06 Jun 2019 08:55 )  Alb: 2.9 g/dL / Pro: 5.6 g/dL / ALK PHOS: 239 U/L / ALT: 9 U/L / AST: 14 U/L / GGT: 99 U/L           Culture - Urine (collected 06-04-19 @ 09:00)  Source: .Urine Clean Catch (Midstream)  Final Report (06-05-19 @ 11:58):    No growth    Culture - Blood (collected 06-03-19 @ 20:30)  Source: .Blood Blood-Peripheral  Preliminary Report (06-05-19 @ 07:00):    No growth to date.    Culture - Blood (collected 06-03-19 @ 20:30)  Source: .Blood Blood-Peripheral  Preliminary Report (06-05-19 @ 07:00):    No growth to date.        Lactate, Blood: 1.8 mmol/L (06-04-19 @ 08:31)  Blood Gas Venous - Lactate: 1.8 mmoL/L (06-03-19 @ 20:48)      INFECTIOUS DISEASES TESTING      RADIOLOGY & ADDITIONAL TESTS:  I have personally reviewed the last Chest xray  CXR      CT      CARDIOLOGY TESTING  12 Lead ECG:   Ventricular Rate 80 BPM    Atrial Rate 80 BPM    P-R Interval 208 ms    QRS Duration 80 ms    Q-T Interval 356 ms    QTC Calculation(Bezet) 410 ms    P Axis 70 degrees    R Axis -18 degrees    T Axis 14 degrees    Diagnosis Line Normal sinus rhythm  Septal infarct , age undetermined  Inferior infarct , age undetermined  Abnormal ECG    Confirmed by VICENTE SEN MD ([id number]) on 6/3/2019 10:54:28 PM (06-03-19 @ 20:34)      MEDICATIONS  acetaminophen   Tablet .. 650  aspirin enteric coated 81  BACItracin   Ointment 1  chlorhexidine 4% Liquid 1  collagenase Ointment 1  collagenase Ointment 1  Dakins Solution - Full Strength 1  docusate sodium 100  gabapentin 300  heparin  Injectable 5000  linezolid  IVPB   linezolid  IVPB 600  OLANZapine 2.5  piperacillin/tazobactam IVPB. 3.375  senna 2  simvastatin 20      ANTIBIOTICS:  linezolid  IVPB      linezolid  IVPB 600 milliGRAM(s) IV Intermittent every 12 hours  piperacillin/tazobactam IVPB. 3.375 Gram(s) IV Intermittent every 6 hours

## 2019-06-06 NOTE — DISCHARGE NOTE PROVIDER - CARE PROVIDERS DIRECT ADDRESSES
[email or web address]

## 2019-06-06 NOTE — PROGRESS NOTE ADULT - PROVIDER SPECIALTY LIST ADULT
Infectious Disease
Internal Medicine
Infectious Disease

## 2019-06-06 NOTE — PROGRESS NOTE ADULT - SUBJECTIVE AND OBJECTIVE BOX
Patient was seen and examined. Spoke with RN. Chart reviewed.  off abx  feels weak, discussed with pt and her son,  str/snf    No events overnight.  Vital Signs Last 24 Hrs  T(F): 97 (06 Jun 2019 05:00), Max: 98.3 (05 Jun 2019 20:50)  HR: 58 (06 Jun 2019 05:00) (58 - 105)  BP: 101/53 (06 Jun 2019 05:00) (94/53 - 177/97)  SpO2: --  MEDICATIONS  (STANDING):  acetaminophen   Tablet .. 650 milliGRAM(s) Oral every 6 hours  aspirin enteric coated 81 milliGRAM(s) Oral daily  BACItracin   Ointment 1 Application(s) Topical two times a day  chlorhexidine 4% Liquid 1 Application(s) Topical <User Schedule>  collagenase Ointment 1 Application(s) Topical two times a day  collagenase Ointment 1 Application(s) Topical daily  Dakins Solution - Full Strength 1 Application(s) Topical daily  docusate sodium 100 milliGRAM(s) Oral daily  gabapentin 300 milliGRAM(s) Oral at bedtime  heparin  Injectable 5000 Unit(s) SubCutaneous every 8 hours  linezolid  IVPB      linezolid  IVPB 600 milliGRAM(s) IV Intermittent every 12 hours  OLANZapine 2.5 milliGRAM(s) Oral two times a day  piperacillin/tazobactam IVPB. 3.375 Gram(s) IV Intermittent every 6 hours  senna 2 Tablet(s) Oral at bedtime  simvastatin 20 milliGRAM(s) Oral at bedtime    MEDICATIONS  (PRN):    Labs:                        10.4   6.21  )-----------( 400      ( 06 Jun 2019 08:55 )             32.8     06 Jun 2019 08:55    136    |  99     |  13     ----------------------------<  141    4.6     |  24     |  0.8      Ca    8.5        06 Jun 2019 08:55  Mg     2.3       06 Jun 2019 08:55    TPro  5.6    /  Alb  2.9    /  TBili  0.4    /  DBili  x      /  AST  14     /  ALT  9      /  AlkPhos  239    06 Jun 2019 08:55          Culture - Urine (collected 04 Jun 2019 09:00)  Source: .Urine Clean Catch (Midstream)  Final Report (05 Jun 2019 11:58):    No growth    Culture - Blood (collected 03 Jun 2019 20:30)  Source: .Blood Blood-Peripheral  Preliminary Report (05 Jun 2019 07:00):    No growth to date.    Culture - Blood (collected 03 Jun 2019 20:30)  Source: .Blood Blood-Peripheral  Preliminary Report (05 Jun 2019 07:00):    No growth to date.        Radiology:    General: comfortable, NAD  Neurology: A&Ox3, nonfocal  Head:  Normocephalic, atraumatic  ENT:  Mucosa moist, no ulcerations  Neck:  Supple, no JVD,   Resp: CTA B/L  CV: RRR, S1S2,   GI: Soft, NT, bowel sounds  MS:dressing lle, + peripheral pulses, FROM all 4 extremity

## 2019-06-06 NOTE — PHYSICAL THERAPY INITIAL EVALUATION ADULT - CRITERIA FOR SKILLED THERAPEUTIC INTERVENTIONS
risk reduction/prevention/impairments found/rehab potential/functional limitations in following categories/therapy frequency/anticipated equipment needs at discharge/predicted duration of therapy intervention

## 2019-06-06 NOTE — DISCHARGE NOTE PROVIDER - PROVIDER TOKENS
PROVIDER:[TOKEN:[[id number]],FOLLOWUP:[2 weeks]] PROVIDER:[TOKEN:[[id number]],FOLLOWUP:[2 weeks]],PROVIDER:[TOKEN:[[id number]],FOLLOWUP:[Routine]]

## 2019-06-06 NOTE — PROGRESS NOTE ADULT - ASSESSMENT
rle cellulitis  venous stasis ulcer  sepsis not poa  hyponatremia  pad      contnue po abx  id fu  fu cx  burn fu oted  continue current rx  dc planning str/snf

## 2019-06-06 NOTE — DISCHARGE NOTE PROVIDER - NSDCFUADDINST_GEN_ALL_CORE_FT
Please follow up with your primary care doctor within 1 week of discharge from the hospital. Please also follow up with Dr. Kurtz within 2 weeks of discharge from the hospital.

## 2019-06-06 NOTE — PROGRESS NOTE ADULT - REASON FOR ADMISSION
fever and chills x 2 days

## 2019-06-06 NOTE — PHYSICAL THERAPY INITIAL EVALUATION ADULT - TRANSFER SAFETY CONCERNS NOTED: SIT/STAND, REHAB EVAL
decreased sequencing ability/decreased balance during turns/decreased safety awareness/decreased weight-shifting ability

## 2019-06-06 NOTE — DISCHARGE NOTE PROVIDER - CARE PROVIDER_API CALL
David Kurtz)  Plastic Surgery  [address] [place] [id number]  Phone: [phone]  Fax: [phone]  Follow Up Time: 2 weeks David Kurtz)  Plastic Surgery  [address] [place] [address]  Phone: [phone]  Fax: [phone]  Follow Up Time: 2 weeks    Wilian Crenshaw)  Surgery; Vascular Surgery  [address] [place] [address]  Phone: [phone]  Fax: [phone]  Follow Up Time: Routine

## 2019-06-06 NOTE — DISCHARGE NOTE PROVIDER - NSDCCPCAREPLAN_GEN_ALL_CORE_FT
PRINCIPAL DISCHARGE DIAGNOSIS  Diagnosis: Cellulitis  Assessment and Plan of Treatment: Please complete your antibiotic course as prescribed. You will also need to continue with your wound care as you have been previously been doing. Please follow up with your primary care doctor within 1 week of discharge. PRINCIPAL DISCHARGE DIAGNOSIS  Diagnosis: Cellulitis  Assessment and Plan of Treatment: Please complete your antibiotic course as prescribed. You will also need to continue with your wound care as you have been previously been doing. Please follow up with your primary care doctor within 1 week of discharge.      SECONDARY DISCHARGE DIAGNOSES  Diagnosis: Chronic cutaneous venous stasis ulcer  Assessment and Plan of Treatment: Please continue with your wound care. You will need to continue to follow with Dr. Kurtz after discharge from the hospital. Please make an appointment to see him with 2-3 weeks of discharge from the hospital. Your wound care orders are as follows: soap and water daily, santyl ointment and dakins solution wet to dry dressing change 1-2 times a day, Ace wrap from toes to below knee. Keep left leg elevated. PRINCIPAL DISCHARGE DIAGNOSIS  Diagnosis: Cellulitis  Assessment and Plan of Treatment: Please complete your antibiotic course as prescribed. You will also need to continue with your wound care as you have been previously been doing. Please follow up with your primary care doctor within 1 week of discharge.      SECONDARY DISCHARGE DIAGNOSES  Diagnosis: Peripheral vascular disease  Assessment and Plan of Treatment: Please follow up with Dr. Flower carr on a routine basis for managmeent of your PVD.    Diagnosis: Chronic cutaneous venous stasis ulcer  Assessment and Plan of Treatment: Please continue with your wound care. You will need to continue to follow with Dr. Kurtz after discharge from the hospital. Please make an appointment to see him with 2-3 weeks of discharge from the hospital. Your wound care orders are as follows: soap and water daily, santyl ointment and dakins solution wet to dry dressing change 1-2 times a day, Ace wrap from toes to below knee. Keep left leg elevated.

## 2019-06-06 NOTE — DISCHARGE NOTE PROVIDER - HOSPITAL COURSE
86 yo F wth PVD, HTN, chronic stasis ulcer, multiple debridement by Burn p/w 2 days fever and chills. Was admitted for cellulitis surrounding her chronic ulcer of the right lower ext. Not septic on presentation however on the floors she spiked fever, became tachycardic and her BP was borderline low. So abx coverage was broadened to add Zyvox. She has now bee afebrile for 48 hrs with vitals remaining within normal limits.

## 2019-06-06 NOTE — PHYSICAL THERAPY INITIAL EVALUATION ADULT - PLANNED THERAPY INTERVENTIONS, PT EVAL
gait training/stair negotiation/balance training/strengthening/bed mobility training/transfer training

## 2019-06-06 NOTE — PROGRESS NOTE ADULT - ASSESSMENT
ASSESSMENT  84 yo F wth PVD, HTN, chronic stasis ulcer, multiple debridement by Burn for chronic LLE wound , last culture with pseudomonas with plan to take PO levaquin/flagyl to end 5/6 p/w 2 days fever and chills.  Vanc allergy (flushing, likely red person and not true allergy)    PROBLEMS  #Sepsis ruled out on admission, now febrile suspected LLE cellulitis   4/23 wcx pseudomonas and coNS   chronic illness with mild progression   BCX NG  Duplex no DVT  #Hyponatremia , resolved    RECOMMENDATIONS  - D/C on PO levofloxacin 500mg q24h (crcl 72) and doxy 100mg BID to complete 7 days   - Burn following     Spectra [id number]

## 2019-06-07 ENCOUNTER — OUTPATIENT (OUTPATIENT)
Dept: OUTPATIENT SERVICES | Facility: HOSPITAL | Age: 84
LOS: 1 days | Discharge: HOME | End: 2019-06-07

## 2019-06-07 ENCOUNTER — INBOUND DOCUMENT (OUTPATIENT)
Age: 84
End: 2019-06-07

## 2019-06-07 DIAGNOSIS — Z96.649 PRESENCE OF UNSPECIFIED ARTIFICIAL HIP JOINT: Chronic | ICD-10-CM

## 2019-06-07 DIAGNOSIS — R10.9 UNSPECIFIED ABDOMINAL PAIN: ICD-10-CM

## 2019-06-09 LAB
CULTURE RESULTS: SIGNIFICANT CHANGE UP
CULTURE RESULTS: SIGNIFICANT CHANGE UP
SPECIMEN SOURCE: SIGNIFICANT CHANGE UP
SPECIMEN SOURCE: SIGNIFICANT CHANGE UP

## 2019-06-10 LAB
CULTURE RESULTS: SIGNIFICANT CHANGE UP
SPECIMEN SOURCE: SIGNIFICANT CHANGE UP

## 2019-06-11 ENCOUNTER — OUTPATIENT (OUTPATIENT)
Dept: OUTPATIENT SERVICES | Facility: HOSPITAL | Age: 84
LOS: 1 days | Discharge: HOME | End: 2019-06-11

## 2019-06-11 DIAGNOSIS — Z96.649 PRESENCE OF UNSPECIFIED ARTIFICIAL HIP JOINT: Chronic | ICD-10-CM

## 2019-06-11 DIAGNOSIS — R79.9 ABNORMAL FINDING OF BLOOD CHEMISTRY, UNSPECIFIED: ICD-10-CM

## 2019-06-13 DIAGNOSIS — Z91.19 PATIENT'S NONCOMPLIANCE WITH OTHER MEDICAL TREATMENT AND REGIMEN: ICD-10-CM

## 2019-06-13 DIAGNOSIS — L89.322 PRESSURE ULCER OF LEFT BUTTOCK, STAGE 2: ICD-10-CM

## 2019-06-13 DIAGNOSIS — G62.9 POLYNEUROPATHY, UNSPECIFIED: ICD-10-CM

## 2019-06-13 DIAGNOSIS — E87.1 HYPO-OSMOLALITY AND HYPONATREMIA: ICD-10-CM

## 2019-06-13 DIAGNOSIS — L97.821 NON-PRESSURE CHRONIC ULCER OF OTHER PART OF LEFT LOWER LEG LIMITED TO BREAKDOWN OF SKIN: ICD-10-CM

## 2019-06-13 DIAGNOSIS — L89.302 PRESSURE ULCER OF UNSPECIFIED BUTTOCK, STAGE 2: ICD-10-CM

## 2019-06-13 DIAGNOSIS — Z88.1 ALLERGY STATUS TO OTHER ANTIBIOTIC AGENTS STATUS: ICD-10-CM

## 2019-06-13 DIAGNOSIS — R74.8 ABNORMAL LEVELS OF OTHER SERUM ENZYMES: ICD-10-CM

## 2019-06-13 DIAGNOSIS — I73.9 PERIPHERAL VASCULAR DISEASE, UNSPECIFIED: ICD-10-CM

## 2019-06-13 DIAGNOSIS — L03.116 CELLULITIS OF LEFT LOWER LIMB: ICD-10-CM

## 2019-06-13 DIAGNOSIS — I83.028 VARICOSE VEINS OF LEFT LOWER EXTREMITY WITH ULCER OTHER PART OF LOWER LEG: ICD-10-CM

## 2019-06-13 DIAGNOSIS — I10 ESSENTIAL (PRIMARY) HYPERTENSION: ICD-10-CM

## 2019-06-19 ENCOUNTER — OUTPATIENT (OUTPATIENT)
Dept: OUTPATIENT SERVICES | Facility: HOSPITAL | Age: 84
LOS: 1 days | Discharge: HOME | End: 2019-06-19

## 2019-06-19 DIAGNOSIS — Z96.649 PRESENCE OF UNSPECIFIED ARTIFICIAL HIP JOINT: Chronic | ICD-10-CM

## 2019-06-19 DIAGNOSIS — R79.9 ABNORMAL FINDING OF BLOOD CHEMISTRY, UNSPECIFIED: ICD-10-CM

## 2019-06-23 ENCOUNTER — OUTPATIENT (OUTPATIENT)
Dept: OUTPATIENT SERVICES | Facility: HOSPITAL | Age: 84
LOS: 1 days | Discharge: HOME | End: 2019-06-23

## 2019-06-23 DIAGNOSIS — Z96.649 PRESENCE OF UNSPECIFIED ARTIFICIAL HIP JOINT: Chronic | ICD-10-CM

## 2019-06-23 DIAGNOSIS — R79.9 ABNORMAL FINDING OF BLOOD CHEMISTRY, UNSPECIFIED: ICD-10-CM

## 2019-06-24 ENCOUNTER — OUTPATIENT (OUTPATIENT)
Dept: OUTPATIENT SERVICES | Facility: HOSPITAL | Age: 84
LOS: 1 days | Discharge: HOME | End: 2019-06-24

## 2019-06-24 DIAGNOSIS — Z96.649 PRESENCE OF UNSPECIFIED ARTIFICIAL HIP JOINT: Chronic | ICD-10-CM

## 2019-06-25 ENCOUNTER — OUTPATIENT (OUTPATIENT)
Dept: OUTPATIENT SERVICES | Facility: HOSPITAL | Age: 84
LOS: 1 days | Discharge: HOME | End: 2019-06-25

## 2019-06-25 DIAGNOSIS — R79.9 ABNORMAL FINDING OF BLOOD CHEMISTRY, UNSPECIFIED: ICD-10-CM

## 2019-06-25 DIAGNOSIS — Z96.649 PRESENCE OF UNSPECIFIED ARTIFICIAL HIP JOINT: Chronic | ICD-10-CM

## 2019-07-05 ENCOUNTER — OUTPATIENT (OUTPATIENT)
Dept: OUTPATIENT SERVICES | Facility: HOSPITAL | Age: 84
LOS: 1 days | Discharge: HOME | End: 2019-07-05

## 2019-07-05 DIAGNOSIS — R79.9 ABNORMAL FINDING OF BLOOD CHEMISTRY, UNSPECIFIED: ICD-10-CM

## 2019-07-05 DIAGNOSIS — Z96.649 PRESENCE OF UNSPECIFIED ARTIFICIAL HIP JOINT: Chronic | ICD-10-CM

## 2019-07-08 ENCOUNTER — OUTPATIENT (OUTPATIENT)
Dept: OUTPATIENT SERVICES | Facility: HOSPITAL | Age: 84
LOS: 1 days | Discharge: HOME | End: 2019-07-08

## 2019-07-08 DIAGNOSIS — Z96.649 PRESENCE OF UNSPECIFIED ARTIFICIAL HIP JOINT: Chronic | ICD-10-CM

## 2019-07-08 DIAGNOSIS — D64.9 ANEMIA, UNSPECIFIED: ICD-10-CM

## 2019-07-09 ENCOUNTER — OUTPATIENT (OUTPATIENT)
Dept: OUTPATIENT SERVICES | Facility: HOSPITAL | Age: 84
LOS: 1 days | Discharge: HOME | End: 2019-07-09

## 2019-07-09 DIAGNOSIS — R79.9 ABNORMAL FINDING OF BLOOD CHEMISTRY, UNSPECIFIED: ICD-10-CM

## 2019-07-09 DIAGNOSIS — D64.9 ANEMIA, UNSPECIFIED: ICD-10-CM

## 2019-07-09 DIAGNOSIS — Z96.649 PRESENCE OF UNSPECIFIED ARTIFICIAL HIP JOINT: Chronic | ICD-10-CM

## 2019-07-09 DIAGNOSIS — N39.9 DISORDER OF URINARY SYSTEM, UNSPECIFIED: ICD-10-CM

## 2019-07-10 ENCOUNTER — OUTPATIENT (OUTPATIENT)
Dept: OUTPATIENT SERVICES | Facility: HOSPITAL | Age: 84
LOS: 1 days | Discharge: HOME | End: 2019-07-10

## 2019-07-10 ENCOUNTER — INPATIENT (INPATIENT)
Facility: HOSPITAL | Age: 84
LOS: 5 days | Discharge: NURSING, REHAB OR LONG-TERM CARE | End: 2019-07-16
Attending: INTERNAL MEDICINE | Admitting: INTERNAL MEDICINE
Payer: MEDICARE

## 2019-07-10 VITALS
SYSTOLIC BLOOD PRESSURE: 90 MMHG | DIASTOLIC BLOOD PRESSURE: 54 MMHG | TEMPERATURE: 97 F | HEART RATE: 70 BPM | RESPIRATION RATE: 18 BRPM | OXYGEN SATURATION: 96 %

## 2019-07-10 DIAGNOSIS — Z96.649 PRESENCE OF UNSPECIFIED ARTIFICIAL HIP JOINT: Chronic | ICD-10-CM

## 2019-07-10 DIAGNOSIS — R79.9 ABNORMAL FINDING OF BLOOD CHEMISTRY, UNSPECIFIED: ICD-10-CM

## 2019-07-10 PROCEDURE — 99291 CRITICAL CARE FIRST HOUR: CPT | Mod: GC

## 2019-07-11 LAB
ALBUMIN SERPL ELPH-MCNC: 2.6 G/DL — LOW (ref 3.5–5.2)
ALP SERPL-CCNC: 100 U/L — SIGNIFICANT CHANGE UP (ref 30–115)
ALT FLD-CCNC: <5 U/L — SIGNIFICANT CHANGE UP (ref 0–41)
ANION GAP SERPL CALC-SCNC: 11 MMOL/L — SIGNIFICANT CHANGE UP (ref 7–14)
ANION GAP SERPL CALC-SCNC: 13 MMOL/L — SIGNIFICANT CHANGE UP (ref 7–14)
ANISOCYTOSIS BLD QL: SLIGHT — SIGNIFICANT CHANGE UP
APPEARANCE UR: ABNORMAL
AST SERPL-CCNC: 13 U/L — SIGNIFICANT CHANGE UP (ref 0–41)
BACTERIA # UR AUTO: ABNORMAL
BASE EXCESS BLDV CALC-SCNC: 1.7 MMOL/L — SIGNIFICANT CHANGE UP (ref -2–2)
BASOPHILS # BLD AUTO: 0.02 K/UL — SIGNIFICANT CHANGE UP (ref 0–0.2)
BASOPHILS # BLD AUTO: 0.05 K/UL — SIGNIFICANT CHANGE UP (ref 0–0.2)
BASOPHILS NFR BLD AUTO: 0.2 % — SIGNIFICANT CHANGE UP (ref 0–1)
BASOPHILS NFR BLD AUTO: 0.9 % — SIGNIFICANT CHANGE UP (ref 0–1)
BILIRUB SERPL-MCNC: 0.4 MG/DL — SIGNIFICANT CHANGE UP (ref 0.2–1.2)
BILIRUB UR-MCNC: NEGATIVE — SIGNIFICANT CHANGE UP
BUN SERPL-MCNC: 19 MG/DL — SIGNIFICANT CHANGE UP (ref 10–20)
BUN SERPL-MCNC: 22 MG/DL — HIGH (ref 10–20)
CA-I SERPL-SCNC: 1.16 MMOL/L — SIGNIFICANT CHANGE UP (ref 1.12–1.3)
CALCIUM SERPL-MCNC: 7.8 MG/DL — LOW (ref 8.5–10.1)
CALCIUM SERPL-MCNC: 8.1 MG/DL — LOW (ref 8.5–10.1)
CHLORIDE SERPL-SCNC: 100 MMOL/L — SIGNIFICANT CHANGE UP (ref 98–110)
CHLORIDE SERPL-SCNC: 101 MMOL/L — SIGNIFICANT CHANGE UP (ref 98–110)
CK MB CFR SERPL CALC: <1 NG/ML — SIGNIFICANT CHANGE UP (ref 0.6–6.3)
CO2 SERPL-SCNC: 22 MMOL/L — SIGNIFICANT CHANGE UP (ref 17–32)
CO2 SERPL-SCNC: 22 MMOL/L — SIGNIFICANT CHANGE UP (ref 17–32)
COLOR SPEC: YELLOW — SIGNIFICANT CHANGE UP
CREAT SERPL-MCNC: 0.8 MG/DL — SIGNIFICANT CHANGE UP (ref 0.7–1.5)
CREAT SERPL-MCNC: 0.8 MG/DL — SIGNIFICANT CHANGE UP (ref 0.7–1.5)
CRP SERPL-MCNC: 14.59 MG/DL — HIGH (ref 0–0.4)
DIFF PNL FLD: NEGATIVE — SIGNIFICANT CHANGE UP
EOSINOPHIL # BLD AUTO: 0.01 K/UL — SIGNIFICANT CHANGE UP (ref 0–0.7)
EOSINOPHIL # BLD AUTO: 0.05 K/UL — SIGNIFICANT CHANGE UP (ref 0–0.7)
EOSINOPHIL NFR BLD AUTO: 0.1 % — SIGNIFICANT CHANGE UP (ref 0–8)
EOSINOPHIL NFR BLD AUTO: 0.9 % — SIGNIFICANT CHANGE UP (ref 0–8)
ERYTHROCYTE [SEDIMENTATION RATE] IN BLOOD: 58 MM/HR — HIGH (ref 0–20)
FERRITIN SERPL-MCNC: 216 NG/ML — HIGH (ref 15–150)
FOLATE SERPL-MCNC: 10.5 NG/ML — SIGNIFICANT CHANGE UP
GAS PNL BLDV: 135 MMOL/L — LOW (ref 136–145)
GAS PNL BLDV: SIGNIFICANT CHANGE UP
GIANT PLATELETS BLD QL SMEAR: PRESENT — SIGNIFICANT CHANGE UP
GLUCOSE SERPL-MCNC: 134 MG/DL — HIGH (ref 70–99)
GLUCOSE SERPL-MCNC: 198 MG/DL — HIGH (ref 70–99)
GLUCOSE UR QL: NEGATIVE — SIGNIFICANT CHANGE UP
HAPTOGLOB SERPL-MCNC: 350 MG/DL — HIGH (ref 34–200)
HCO3 BLDV-SCNC: 26 MMOL/L — SIGNIFICANT CHANGE UP (ref 22–29)
HCT VFR BLD CALC: 24.4 % — LOW (ref 37–47)
HCT VFR BLD CALC: 25.3 % — LOW (ref 37–47)
HCT VFR BLDA CALC: 26.2 % — LOW (ref 34–44)
HGB BLD CALC-MCNC: 8.5 G/DL — LOW (ref 14–18)
HGB BLD-MCNC: 7.7 G/DL — LOW (ref 12–16)
HGB BLD-MCNC: 8.1 G/DL — LOW (ref 12–16)
HYPOCHROMIA BLD QL: SLIGHT — SIGNIFICANT CHANGE UP
IMM GRANULOCYTES NFR BLD AUTO: 0.5 % — HIGH (ref 0.1–0.3)
IRON SATN MFR SERPL: 15 UG/DL — LOW (ref 35–150)
IRON SATN MFR SERPL: 8 % — LOW (ref 15–50)
KETONES UR-MCNC: NEGATIVE — SIGNIFICANT CHANGE UP
LACTATE BLDV-MCNC: 1 MMOL/L — SIGNIFICANT CHANGE UP (ref 0.5–1.6)
LDH SERPL L TO P-CCNC: 178 — SIGNIFICANT CHANGE UP (ref 50–242)
LEUKOCYTE ESTERASE UR-ACNC: ABNORMAL
LYMPHOCYTES # BLD AUTO: 0.1 K/UL — LOW (ref 1.2–3.4)
LYMPHOCYTES # BLD AUTO: 0.11 K/UL — LOW (ref 1.2–3.4)
LYMPHOCYTES # BLD AUTO: 1.3 % — LOW (ref 20.5–51.1)
LYMPHOCYTES # BLD AUTO: 1.7 % — LOW (ref 20.5–51.1)
MANUAL SMEAR VERIFICATION: SIGNIFICANT CHANGE UP
MCHC RBC-ENTMCNC: 27.7 PG — SIGNIFICANT CHANGE UP (ref 27–31)
MCHC RBC-ENTMCNC: 28 PG — SIGNIFICANT CHANGE UP (ref 27–31)
MCHC RBC-ENTMCNC: 31.6 G/DL — LOW (ref 32–37)
MCHC RBC-ENTMCNC: 32 G/DL — SIGNIFICANT CHANGE UP (ref 32–37)
MCV RBC AUTO: 87.5 FL — SIGNIFICANT CHANGE UP (ref 81–99)
MCV RBC AUTO: 87.8 FL — SIGNIFICANT CHANGE UP (ref 81–99)
MICROCYTES BLD QL: SLIGHT — SIGNIFICANT CHANGE UP
MONOCYTES # BLD AUTO: 0.47 K/UL — SIGNIFICANT CHANGE UP (ref 0.1–0.6)
MONOCYTES # BLD AUTO: 0.51 K/UL — SIGNIFICANT CHANGE UP (ref 0.1–0.6)
MONOCYTES NFR BLD AUTO: 5.6 % — SIGNIFICANT CHANGE UP (ref 1.7–9.3)
MONOCYTES NFR BLD AUTO: 8.8 % — SIGNIFICANT CHANGE UP (ref 1.7–9.3)
NEUTROPHILS # BLD AUTO: 5.05 K/UL — SIGNIFICANT CHANGE UP (ref 1.4–6.5)
NEUTROPHILS # BLD AUTO: 7.72 K/UL — HIGH (ref 1.4–6.5)
NEUTROPHILS NFR BLD AUTO: 87.7 % — HIGH (ref 42.2–75.2)
NEUTROPHILS NFR BLD AUTO: 92.3 % — HIGH (ref 42.2–75.2)
NITRITE UR-MCNC: NEGATIVE — SIGNIFICANT CHANGE UP
NRBC # BLD: 0 /100 WBCS — SIGNIFICANT CHANGE UP (ref 0–0)
PCO2 BLDV: 41 MMHG — SIGNIFICANT CHANGE UP (ref 41–51)
PH BLDV: 7.41 — SIGNIFICANT CHANGE UP (ref 7.26–7.43)
PH UR: 6 — SIGNIFICANT CHANGE UP (ref 5–8)
PLAT MORPH BLD: NORMAL — SIGNIFICANT CHANGE UP
PLATELET # BLD AUTO: 487 K/UL — HIGH (ref 130–400)
PLATELET # BLD AUTO: 488 K/UL — HIGH (ref 130–400)
PO2 BLDV: 49 MMHG — HIGH (ref 20–40)
POIKILOCYTOSIS BLD QL AUTO: SLIGHT — SIGNIFICANT CHANGE UP
POTASSIUM BLDV-SCNC: 4.4 MMOL/L — SIGNIFICANT CHANGE UP (ref 3.3–5.6)
POTASSIUM SERPL-MCNC: 4.9 MMOL/L — SIGNIFICANT CHANGE UP (ref 3.5–5)
POTASSIUM SERPL-MCNC: 5 MMOL/L — SIGNIFICANT CHANGE UP (ref 3.5–5)
POTASSIUM SERPL-SCNC: 4.9 MMOL/L — SIGNIFICANT CHANGE UP (ref 3.5–5)
POTASSIUM SERPL-SCNC: 5 MMOL/L — SIGNIFICANT CHANGE UP (ref 3.5–5)
PROT SERPL-MCNC: 5.4 G/DL — LOW (ref 6–8)
PROT UR-MCNC: NEGATIVE — SIGNIFICANT CHANGE UP
RBC # BLD: 2.78 M/UL — LOW (ref 4.2–5.4)
RBC # BLD: 2.89 M/UL — LOW (ref 4.2–5.4)
RBC # BLD: 3.43 M/UL — LOW (ref 4.2–5.4)
RBC # FLD: 15.9 % — HIGH (ref 11.5–14.5)
RBC # FLD: 16 % — HIGH (ref 11.5–14.5)
RBC BLD AUTO: ABNORMAL
RETICS #: 44.6 K/UL — SIGNIFICANT CHANGE UP (ref 25–125)
RETICS/RBC NFR: 1.3 % — SIGNIFICANT CHANGE UP (ref 0.5–1.5)
SAO2 % BLDV: 84 % — SIGNIFICANT CHANGE UP
SODIUM SERPL-SCNC: 133 MMOL/L — LOW (ref 135–146)
SODIUM SERPL-SCNC: 136 MMOL/L — SIGNIFICANT CHANGE UP (ref 135–146)
SP GR SPEC: 1.01 — SIGNIFICANT CHANGE UP (ref 1.01–1.03)
TIBC SERPL-MCNC: 185 UG/DL — LOW (ref 220–430)
TRANSFERRIN SERPL-MCNC: 148 MG/DL — LOW (ref 200–360)
TROPONIN T SERPL-MCNC: <0.01 NG/ML — SIGNIFICANT CHANGE UP
TSH SERPL-MCNC: 5.67 UIU/ML — HIGH (ref 0.27–4.2)
UIBC SERPL-MCNC: 170 UG/DL — SIGNIFICANT CHANGE UP (ref 110–370)
UROBILINOGEN FLD QL: 0.2 — SIGNIFICANT CHANGE UP (ref 0.2–0.2)
VIT B12 SERPL-MCNC: 534 PG/ML — SIGNIFICANT CHANGE UP (ref 232–1245)
WBC # BLD: 5.76 K/UL — SIGNIFICANT CHANGE UP (ref 4.8–10.8)
WBC # BLD: 8.37 K/UL — SIGNIFICANT CHANGE UP (ref 4.8–10.8)
WBC # FLD AUTO: 5.76 K/UL — SIGNIFICANT CHANGE UP (ref 4.8–10.8)
WBC # FLD AUTO: 8.37 K/UL — SIGNIFICANT CHANGE UP (ref 4.8–10.8)
WBC UR QL: 5 /HPF — SIGNIFICANT CHANGE UP (ref 0–5)

## 2019-07-11 PROCEDURE — 93970 EXTREMITY STUDY: CPT | Mod: 26

## 2019-07-11 PROCEDURE — 74177 CT ABD & PELVIS W/CONTRAST: CPT | Mod: 26

## 2019-07-11 PROCEDURE — 93010 ELECTROCARDIOGRAM REPORT: CPT

## 2019-07-11 PROCEDURE — 73620 X-RAY EXAM OF FOOT: CPT | Mod: 26,LT

## 2019-07-11 PROCEDURE — 71045 X-RAY EXAM CHEST 1 VIEW: CPT | Mod: 26

## 2019-07-11 PROCEDURE — 93925 LOWER EXTREMITY STUDY: CPT | Mod: 26

## 2019-07-11 RX ORDER — LINEZOLID 600 MG/300ML
600 INJECTION, SOLUTION INTRAVENOUS EVERY 12 HOURS
Refills: 0 | Status: DISCONTINUED | OUTPATIENT
Start: 2019-07-11 | End: 2019-07-12

## 2019-07-11 RX ORDER — FERROUS SULFATE 325(65) MG
1 TABLET ORAL
Qty: 0 | Refills: 0 | DISCHARGE

## 2019-07-11 RX ORDER — SODIUM CHLORIDE 9 MG/ML
500 INJECTION INTRAMUSCULAR; INTRAVENOUS; SUBCUTANEOUS ONCE
Refills: 0 | Status: COMPLETED | OUTPATIENT
Start: 2019-07-11 | End: 2019-07-11

## 2019-07-11 RX ORDER — PANTOPRAZOLE SODIUM 20 MG/1
40 TABLET, DELAYED RELEASE ORAL
Refills: 0 | Status: DISCONTINUED | OUTPATIENT
Start: 2019-07-11 | End: 2019-07-16

## 2019-07-11 RX ORDER — NOREPINEPHRINE BITARTRATE/D5W 8 MG/250ML
0.05 PLASTIC BAG, INJECTION (ML) INTRAVENOUS
Qty: 8 | Refills: 0 | Status: DISCONTINUED | OUTPATIENT
Start: 2019-07-11 | End: 2019-07-11

## 2019-07-11 RX ORDER — MEROPENEM 1 G/30ML
1000 INJECTION INTRAVENOUS ONCE
Refills: 0 | Status: COMPLETED | OUTPATIENT
Start: 2019-07-11 | End: 2019-07-11

## 2019-07-11 RX ORDER — LINEZOLID 600 MG/300ML
INJECTION, SOLUTION INTRAVENOUS
Refills: 0 | Status: DISCONTINUED | OUTPATIENT
Start: 2019-07-11 | End: 2019-07-12

## 2019-07-11 RX ORDER — CITALOPRAM 10 MG/1
1 TABLET, FILM COATED ORAL
Qty: 0 | Refills: 0 | DISCHARGE

## 2019-07-11 RX ORDER — ENOXAPARIN SODIUM 100 MG/ML
40 INJECTION SUBCUTANEOUS DAILY
Refills: 0 | Status: DISCONTINUED | OUTPATIENT
Start: 2019-07-11 | End: 2019-07-16

## 2019-07-11 RX ORDER — SENNA PLUS 8.6 MG/1
2 TABLET ORAL AT BEDTIME
Refills: 0 | Status: DISCONTINUED | OUTPATIENT
Start: 2019-07-11 | End: 2019-07-16

## 2019-07-11 RX ORDER — MIDODRINE HYDROCHLORIDE 2.5 MG/1
10 TABLET ORAL THREE TIMES A DAY
Refills: 0 | Status: DISCONTINUED | OUTPATIENT
Start: 2019-07-11 | End: 2019-07-11

## 2019-07-11 RX ORDER — SODIUM CHLORIDE 9 MG/ML
1000 INJECTION, SOLUTION INTRAVENOUS ONCE
Refills: 0 | Status: COMPLETED | OUTPATIENT
Start: 2019-07-11 | End: 2019-07-11

## 2019-07-11 RX ORDER — SODIUM CHLORIDE 9 MG/ML
2200 INJECTION, SOLUTION INTRAVENOUS ONCE
Refills: 0 | Status: COMPLETED | OUTPATIENT
Start: 2019-07-11 | End: 2019-07-11

## 2019-07-11 RX ORDER — GABAPENTIN 400 MG/1
300 CAPSULE ORAL AT BEDTIME
Refills: 0 | Status: DISCONTINUED | OUTPATIENT
Start: 2019-07-11 | End: 2019-07-16

## 2019-07-11 RX ORDER — DOCUSATE SODIUM 100 MG
100 CAPSULE ORAL DAILY
Refills: 0 | Status: DISCONTINUED | OUTPATIENT
Start: 2019-07-11 | End: 2019-07-16

## 2019-07-11 RX ORDER — LINEZOLID 600 MG/300ML
600 INJECTION, SOLUTION INTRAVENOUS ONCE
Refills: 0 | Status: COMPLETED | OUTPATIENT
Start: 2019-07-11 | End: 2019-07-11

## 2019-07-11 RX ORDER — MIDODRINE HYDROCHLORIDE 2.5 MG/1
10 TABLET ORAL THREE TIMES A DAY
Refills: 0 | Status: DISCONTINUED | OUTPATIENT
Start: 2019-07-11 | End: 2019-07-12

## 2019-07-11 RX ORDER — LINEZOLID 600 MG/300ML
INJECTION, SOLUTION INTRAVENOUS
Refills: 0 | Status: DISCONTINUED | OUTPATIENT
Start: 2019-07-11 | End: 2019-07-11

## 2019-07-11 RX ORDER — MEROPENEM 1 G/30ML
500 INJECTION INTRAVENOUS EVERY 8 HOURS
Refills: 0 | Status: DISCONTINUED | OUTPATIENT
Start: 2019-07-11 | End: 2019-07-14

## 2019-07-11 RX ORDER — ACETAMINOPHEN 500 MG
650 TABLET ORAL ONCE
Refills: 0 | Status: COMPLETED | OUTPATIENT
Start: 2019-07-11 | End: 2019-07-11

## 2019-07-11 RX ORDER — SIMVASTATIN 20 MG/1
20 TABLET, FILM COATED ORAL AT BEDTIME
Refills: 0 | Status: DISCONTINUED | OUTPATIENT
Start: 2019-07-11 | End: 2019-07-16

## 2019-07-11 RX ORDER — SODIUM CHLORIDE 9 MG/ML
1000 INJECTION INTRAMUSCULAR; INTRAVENOUS; SUBCUTANEOUS ONCE
Refills: 0 | Status: COMPLETED | OUTPATIENT
Start: 2019-07-11 | End: 2019-07-11

## 2019-07-11 RX ORDER — NOREPINEPHRINE BITARTRATE/D5W 8 MG/250ML
0.05 PLASTIC BAG, INJECTION (ML) INTRAVENOUS
Qty: 8 | Refills: 0 | Status: DISCONTINUED | OUTPATIENT
Start: 2019-07-11 | End: 2019-07-12

## 2019-07-11 RX ORDER — FERROUS SULFATE 325(65) MG
325 TABLET ORAL DAILY
Refills: 0 | Status: DISCONTINUED | OUTPATIENT
Start: 2019-07-11 | End: 2019-07-11

## 2019-07-11 RX ORDER — VALSARTAN 80 MG/1
40 TABLET ORAL
Qty: 0 | Refills: 0 | DISCHARGE

## 2019-07-11 RX ORDER — ASPIRIN/CALCIUM CARB/MAGNESIUM 324 MG
81 TABLET ORAL DAILY
Refills: 0 | Status: DISCONTINUED | OUTPATIENT
Start: 2019-07-11 | End: 2019-07-16

## 2019-07-11 RX ORDER — COLLAGENASE CLOSTRIDIUM HIST. 250 UNIT/G
1 OINTMENT (GRAM) TOPICAL DAILY
Refills: 0 | Status: DISCONTINUED | OUTPATIENT
Start: 2019-07-11 | End: 2019-07-16

## 2019-07-11 RX ADMIN — SODIUM CHLORIDE 1000 MILLILITER(S): 9 INJECTION INTRAMUSCULAR; INTRAVENOUS; SUBCUTANEOUS at 14:08

## 2019-07-11 RX ADMIN — MEROPENEM 100 MILLIGRAM(S): 1 INJECTION INTRAVENOUS at 22:07

## 2019-07-11 RX ADMIN — MEROPENEM 100 MILLIGRAM(S): 1 INJECTION INTRAVENOUS at 06:50

## 2019-07-11 RX ADMIN — SODIUM CHLORIDE 1000 MILLILITER(S): 9 INJECTION INTRAMUSCULAR; INTRAVENOUS; SUBCUTANEOUS at 08:28

## 2019-07-11 RX ADMIN — Medication 7.5 MICROGRAM(S)/KG/MIN: at 03:10

## 2019-07-11 RX ADMIN — ENOXAPARIN SODIUM 40 MILLIGRAM(S): 100 INJECTION SUBCUTANEOUS at 11:14

## 2019-07-11 RX ADMIN — Medication 100 MILLIGRAM(S): at 11:14

## 2019-07-11 RX ADMIN — MIDODRINE HYDROCHLORIDE 10 MILLIGRAM(S): 2.5 TABLET ORAL at 22:07

## 2019-07-11 RX ADMIN — LINEZOLID 300 MILLIGRAM(S): 600 INJECTION, SOLUTION INTRAVENOUS at 20:30

## 2019-07-11 RX ADMIN — Medication 650 MILLIGRAM(S): at 11:58

## 2019-07-11 RX ADMIN — Medication 7.5 MICROGRAM(S)/KG/MIN: at 15:02

## 2019-07-11 RX ADMIN — Medication 81 MILLIGRAM(S): at 11:14

## 2019-07-11 RX ADMIN — SODIUM CHLORIDE 1000 MILLILITER(S): 9 INJECTION INTRAMUSCULAR; INTRAVENOUS; SUBCUTANEOUS at 11:58

## 2019-07-11 RX ADMIN — LINEZOLID 300 MILLIGRAM(S): 600 INJECTION, SOLUTION INTRAVENOUS at 08:28

## 2019-07-11 RX ADMIN — MEROPENEM 100 MILLIGRAM(S): 1 INJECTION INTRAVENOUS at 01:20

## 2019-07-11 RX ADMIN — SENNA PLUS 2 TABLET(S): 8.6 TABLET ORAL at 22:07

## 2019-07-11 RX ADMIN — Medication 650 MILLIGRAM(S): at 12:40

## 2019-07-11 RX ADMIN — GABAPENTIN 300 MILLIGRAM(S): 400 CAPSULE ORAL at 22:07

## 2019-07-11 RX ADMIN — PANTOPRAZOLE SODIUM 40 MILLIGRAM(S): 20 TABLET, DELAYED RELEASE ORAL at 08:29

## 2019-07-11 RX ADMIN — SIMVASTATIN 20 MILLIGRAM(S): 20 TABLET, FILM COATED ORAL at 22:08

## 2019-07-11 RX ADMIN — SODIUM CHLORIDE 1000 MILLILITER(S): 9 INJECTION, SOLUTION INTRAVENOUS at 01:44

## 2019-07-11 RX ADMIN — MEROPENEM 100 MILLIGRAM(S): 1 INJECTION INTRAVENOUS at 14:23

## 2019-07-11 RX ADMIN — Medication 1 APPLICATION(S): at 11:14

## 2019-07-11 RX ADMIN — MIDODRINE HYDROCHLORIDE 10 MILLIGRAM(S): 2.5 TABLET ORAL at 20:29

## 2019-07-11 RX ADMIN — Medication 7.5 MICROGRAM(S)/KG/MIN: at 18:45

## 2019-07-11 RX ADMIN — SODIUM CHLORIDE 2200 MILLILITER(S): 9 INJECTION, SOLUTION INTRAVENOUS at 00:13

## 2019-07-11 NOTE — CHART NOTE - NSCHARTNOTEFT_GEN_A_CORE
86 yo F wth PVD, HTN, chronic stasis ulcer, multiple debridement by Burn p/w 1 day hx of hypotension from NH. Hx was obtained from aide at bedside. Pt has been lethargic for the past 3 days w/ reduced po intake. Pt had a tmax of 103F at NH. They checked bP today and was in low 70s so was sent to ED. Pt also was complaining of vague abdominal pain but no dysuria or frequency or urgency. no productive cough, weight loss or hemoptysis or red blood per rectum.    here in the ED, pt never had fever, EBC and lactate has been within normal range.  started on peripheral levo for pressure support after 2.5 L of NS and BP remains in 90s  started on broad spectrum ABx for sepsis coverage.  ID and Bun consulted for wound on LLE being possible source of sepsis.    #Hypotension possibly septic shock? - s/p 3L NS  - wbc wnl. on RA no hypoxia  - LA 1  - left leg wound does not appear infected  - CXR: no opacities  - will start meropenem for now  - f/u UA   - f/u CTAP w/ iV contrast  - f/u Xray of left leg - r/o osteo  - f/u ESR and CRP  - f/u LE duplex  - f/u blood cultures    #Normocytic anemia - no overt bleeding  - Hb 8.1 (baseline 10.4) MCV 85  - f/u iron studies, b12, folate, tsh, retic, ldh and haptoglobin    #PVD  - c/w asa and statin     #HTn  - holding arb    #Activitiy; bed rest  #Diet: dash  #DVT/GI PPX: Lovenox/protonix  #dispo: NH  #Code; Full  #CHG

## 2019-07-11 NOTE — ED PROVIDER NOTE - OBJECTIVE STATEMENT
84yo F pmhx htn, chronic RLE wound cared for by dr gilliam sent in from NH for hypotension. upon arrival pt hypotensive and warm to the touch, sepsis suspected at time of ed arrival. given allergy documented to vanco, janice was given.  and pt unable to give more detailed history. NH believes pt has cellulitis and uti, but unclear where this information came from.

## 2019-07-11 NOTE — H&P ADULT - ATTENDING COMMENTS
Patient seen and examined independently. Agree with resident note.  seen patient today as was accepted from critical care to  medicine today.  VITAL SIGNS (Last 24 hrs):  T(C): 38.1 (07-12-19 @ 17:22), Max: 39.4 (07-12-19 @ 00:15)  HR: 72 (07-12-19 @ 17:22) (72 - 106)  BP: 102/50 (07-12-19 @ 17:22) (97/50 - 150/72)  RR: 18 (07-12-19 @ 17:22) (18 - 20)  SpO2: 98% (07-12-19 @ 17:22) (96% - 99%)  Wt(kg): --  Daily     Daily     I&O's Summary    11 Jul 2019 07:01  -  12 Jul 2019 07:00  --------------------------------------------------------  IN: 300 mL / OUT: 920 mL / NET: -620 mL    12 Jul 2019 07:01  -  12 Jul 2019 18:14  --------------------------------------------------------  IN: 0 mL / OUT: 450 mL / NET: -450 mL                          7.7    8.37  )-----------( 488      ( 11 Jul 2019 11:23 )             24.4   07-11    133<L>  |  100  |  19  ----------------------------<  198<H>  4.9   |  22  |  0.8    Ca    7.8<L>      11 Jul 2019 11:23    TPro  5.4<L>  /  Alb  2.6<L>  /  TBili  0.4  /  DBili  x   /  AST  13  /  ALT  <5  /  AlkPhos  100  07-11  ekg-NSR sinus tachycardia with first degreeAV block.  o/e  awake and alert  erythema in ext  chest-b/l air entry  abd/gi--distended but BS +  edema present all over lower extold chr changes in lower ext  Assessment and plan:  #fever with hypotension-- on meropenem as per ID no clear source   CT showed LN in abd. was on levophed just tapered. now on midodrine and still spiking fever  # Anemia with drop in HB--  could be from dilution-- got lot of fluids yesterday. Oncology consult as denies blood loss. Ct showed LN--  r/o malignancy-- lymphoma. LDH not elevated  # lactic acidosis-- follow labs-- denies abd pain-- follow labs in AM  # HOld antihypertensives  prognosis is guarded. Patient seen and examined independently. Agree with resident note.  seen patient today as was accepted from critical care to  medicine today.  VITAL SIGNS (Last 24 hrs):  T(C): 38.1 (07-12-19 @ 17:22), Max: 39.4 (07-12-19 @ 00:15)  HR: 72 (07-12-19 @ 17:22) (72 - 106)  BP: 102/50 (07-12-19 @ 17:22) (97/50 - 150/72)  RR: 18 (07-12-19 @ 17:22) (18 - 20)  SpO2: 98% (07-12-19 @ 17:22) (96% - 99%)  Wt(kg): --  Daily     Daily     I&O's Summary    11 Jul 2019 07:01  -  12 Jul 2019 07:00  --------------------------------------------------------  IN: 300 mL / OUT: 920 mL / NET: -620 mL    12 Jul 2019 07:01  -  12 Jul 2019 18:14  --------------------------------------------------------  IN: 0 mL / OUT: 450 mL / NET: -450 mL                          7.7    8.37  )-----------( 488      ( 11 Jul 2019 11:23 )             24.4   07-11    133<L>  |  100  |  19  ----------------------------<  198<H>  4.9   |  22  |  0.8    Ca    7.8<L>      11 Jul 2019 11:23    TPro  5.4<L>  /  Alb  2.6<L>  /  TBili  0.4  /  DBili  x   /  AST  13  /  ALT  <5  /  AlkPhos  100  07-11  ekg-NSR sinus tachycardia with first degreeAV block.  o/e  awake and alert  erythema in ext  chest-b/l air entry  abd/gi--distended but BS +  edema present all over lower ext old chr changes in lower ext  Assessment and plan:  #fever with hypotension-- on meropenem as per ID no clear source   CT showed LN in abd. was on levophed just tapered. now on midodrine and still spiking fever  # Anemia with drop in HB--  could be from dilution-- got lot of fluids yesterday. Oncology consult as denies blood loss. Ct showed LN--  r/o malignancy-- lymphoma. LDH not elevated  # lactic acidosis-- follow labs-- denies abd pain-- follow labs in AM  # HOld antihypertensives  prognosis is guarded.

## 2019-07-11 NOTE — ED PROVIDER NOTE - PHYSICAL EXAMINATION
CONSTITUTIONAL: Well-developed; well-nourished;   SKIN: warm, dry  HEAD: Normocephalic; atraumatic  EYES: PERRL, EOMI, no conjunctival erythema  ENT: No nasal discharge; airway clear, mucous membranes moist  NECK: Supple; non tender, FROM  CARD: +S1, S2 no murmurs, gallops, or rubs. Regular rate and rhythm. radial 2+  RESP: poor respiratory effort  ABD: soft ntnd, no rebound, no guarding, no rigidity  EXT: moves extremities, RLE with chronic appear wound, no purulent discharge, no signs of cellulitis   NEURO: Alert, oriented, grossly unremarkable, no focal deficits, cn ii-xii grossly intact  PSYCH: Cooperative, appropriate

## 2019-07-11 NOTE — H&P ADULT - HISTORY OF PRESENT ILLNESS
86 yo F wth PVD, HTN, chronic stasis ulcer, multiple debridement by Burn p/w 1 day hx of hypotension from NH. Hx was obtained from aide at bedside. Pt has been lethargic for the past 3 days w/ reduced po intake. Pt had a tmax of 103F at NH. They checked bP today and was in low 70s so was sent to ED. Pt also was complaining of vague abdominal pain but no dysuria or frequency or urgency. no productive cough, wegiht loss or hemoptysis or red blood per rectum

## 2019-07-11 NOTE — PROVIDER CONTACT NOTE (OTHER) - RECOMMENDATIONS
nurse requesting to restart levophed as patients MAP is below 60 and she has not urinated since woodward discontinuation at 9:30AM

## 2019-07-11 NOTE — PROVIDER CONTACT NOTE (OTHER) - ASSESSMENT
patient is asymptomatic with no complaints at this time. patient is sleepy however easily arousable and oriented

## 2019-07-11 NOTE — PHARMACOTHERAPY INTERVENTION NOTE - COMMENTS
I spoke with Dr Candelario and recommended to adjust dose of Zyvox from 200 mg to 600 mg and order an ID consult because it is a restricted antibiotic  Patient has septic shock

## 2019-07-11 NOTE — ED PROVIDER NOTE - CARE PLAN
Principal Discharge DX:	Hypotension  Secondary Diagnosis:	Pneumonia Principal Discharge DX:	Sepsis  Secondary Diagnosis:	Pneumonia

## 2019-07-11 NOTE — H&P ADULT - ASSESSMENT
84 yo F wth PVD, HTN, chronic stasis ulcer, multiple debridement by Burn p/w 1 day hx of hypotension from NH. Hx was obtained from aide at bedside. Pt has been lethargic for the past 3 days w/ reduced po intake. Pt had a tmax of 103F at NH. They checked bP today and was in low 70s so was sent to ED. Pt also was complaining of vague abdominal pain but no dysuria or frequency or urgency. no productive cough, wegiht loss or hemoptysis or red blood per rectum    #Hypotension possibly septic shock? - s/p 3L NS  - wbc wnl  - LA 1  - left leg wound does not appear infected  - CXR: no opacitieis  - will start meropenem for now  - f/u UA   - f/u CTAP w/ iV contrast  - f/u Xray of left leg - r/o osteo  - f/u ESR and CRP  - f/u LE duplex    #Normocytic anemia - no overt bleeding  - Hb 8.1 (baseline 10.4) MCV 85  - f/u iron studies, b12, folate, tsh, retic, ldh and haptoglobin    #PVD  - c/w asa and statin     #HTn  - holding arb    #Activitiy; bed rest  #Diet: dash  #DVT/GI PPX: Lovenox/protonix  #dispo: NH  #Code; Full  #CHG 86 yo F wth PVD, HTN, chronic stasis ulcer, multiple debridement by Burn p/w 1 day hx of hypotension from NH. Hx was obtained from aide at bedside. Pt has been lethargic for the past 3 days w/ reduced po intake. Pt had a tmax of 103F at NH. They checked bP today and was in low 70s so was sent to ED. Pt also was complaining of vague abdominal pain but no dysuria or frequency or urgency. no productive cough, wegiht loss or hemoptysis or red blood per rectum    #Hypotension possibly septic shock? - s/p 3L NS  - wbc wnl. on RA no hypoxia  - LA 1  - left leg wound does not appear infected  - CXR: no opacitieis  - will start meropenem for now  - f/u UA   - f/u CTAP w/ iV contrast  - f/u Xray of left leg - r/o osteo  - f/u ESR and CRP  - f/u LE duplex  - f/u blood cultures    #Normocytic anemia - no overt bleeding  - Hb 8.1 (baseline 10.4) MCV 85  - f/u iron studies, b12, folate, tsh, retic, ldh and haptoglobin    #PVD  - c/w asa and statin     #HTn  - holding arb    #Activitiy; bed rest  #Diet: dash  #DVT/GI PPX: Lovenox/protonix  #dispo: NH  #Code; Full  #CHG

## 2019-07-11 NOTE — ED ADULT NURSE NOTE - OBJECTIVE STATEMENT
pt is an 84 yo F pmHx of HTN and L Lower extremity wounds (cared for by MD Kurtz) presents from the NH for hypotension. as per NH pt was hypotensive and febrile. upon arrival to the ED pt noted to have productive cough and rhonchi to bilateral auscultation. pt noted to be intermittently hypotensive. pt denies cp, sob, dizziness, palpitations, n/v/d.     Physical exam:  pt aaox2, NAD noted. pt breathing easily and unlabored,  pt normocephalic, no JVD noted, +2 edema bilat lower ext w/ wound on L lower extr. + 2 pulses to bilateral upper and lower ext. cap refill less than 2 seconds, Rhonchi noted bilat lung fields. + bowel sounds in all 4 quads.

## 2019-07-11 NOTE — ED PROVIDER NOTE - ATTENDING CONTRIBUTION TO CARE
85 year old female, pmhx HTN, PVD with chronic leg ulcer, presenting with hypotension, generalized weakness from home. Per EMS report, patient was having generalized weakness x several days that worsened today, as well as productive cough. On arrival patient hypotensive to 70s systolic. Patient is poor historian but denies pain and denies fevers, headache, vision changes, numbness, confusion, URI symptoms, neck pain, chest pain, back pain, dyspnea, palpitations, nausea, vomiting, abdominal pain, diarrhea, constipation, blood in stool/dark stools, urinary symptoms, vaginal bleeding/discharge, leg swelling, rash, recent travel or sick contacts.    Vital Signs: I have reviewed the initial vital signs.  Constitutional: NAD, well-nourished, appears stated age, no acute distress.  HEENT: Airway patent, moist MM, no erythema/swelling/deformity of oral structures. EOMI, PERRLA.  CV: regular rate, regular rhythm, well-perfused extremities, 2+ b/l DP and radial pulses equal.  Lungs: (+) rhonchi bilaterally, no increased WOB.  ABD: NTND, no guarding or rebound, no pulsatile mass, no hernias.   MSK: Neck supple, nontender, nl ROM, no stepoff. Chest nontender. Back nontender in TLS spine or to b/l bony structures or flanks. Ext nontender, nl rom, no deformity.   INTEG: Skin warm, dry, no rash. (+) chronic LLE wound which is well healing, no signs of infection  NEURO: A&Ox3, normal strength, nl sensation throughout, normal speech.   PSYCH: Calm, cooperative, normal affect and interaction.    Will treat as sepsis - IVF bolus, IV abx, cx, labs, re-eval after fluids to see if pressors are necessary. Patient mentating at her baseline per  at this time.

## 2019-07-11 NOTE — PROVIDER CONTACT NOTE (OTHER) - BACKGROUND
patient sent to ED for hypotension and fever. levophed discontinued earlier today by previous RN. multiple bolus' given and patient is still hypotensive.

## 2019-07-11 NOTE — ED ADULT NURSE NOTE - NSIMPLEMENTINTERV_GEN_ALL_ED
Implemented All Fall with Harm Risk Interventions:  [place] to call system. Call bell, personal items and telephone within reach. Instruct patient to call for assistance. Room bathroom lighting operational. Non-slip footwear when patient is off stretcher. Physically safe environment: no spills, clutter or unnecessary equipment. Stretcher in lowest position, wheels locked, appropriate side rails in place. Provide visual cue, wrist band, yellow gown, etc. Monitor gait and stability. Monitor for mental status changes and reorient to person, place, and time. Review medications for side effects contributing to fall risk. Reinforce activity limits and safety measures with patient and family. Provide visual clues: red socks.

## 2019-07-11 NOTE — CONSULT NOTE ADULT - ASSESSMENT
IMPRESSION:  Sepsis/ septic shock: Improved  Left lower leg open ulcer  Recent Quinolone use    PLAN:    CNS: No sedation. Continue with home dose of Gabapentin.     HEENT: Oral care.    PULMONARY:  HOB @ 45 degrees.    CARDIOVASCULAR: 1 liter of LR NS bolus. Then assess volume status. ECHO.  Cardiac enzyme 2 set.     GI: GI prophylaxis.  Feeding as tolerated    RENAL:  Follow up lytes.  Correct as needed    INFECTIOUS DISEASE: Follow up cultures. Continue with meropenem and Daptomycin for now. Burn consult for wound care. Wound culture.     HEMATOLOGICAL:  DVT prophylaxis. Arterial Duplex lower extremity.     ENDOCRINE:  Follow up FS.      MUSCULOSKELETAL: Bed rest  MICU for now.   RICHA woodward. IMPRESSION:  Sepsis/ septic shock: Improved  Left lower leg open ulcer  Recent Quinolone use    PLAN:    CNS: No sedation. Continue with home dose of Gabapentin.     HEENT: Oral care.    PULMONARY:  HOB @ 45 degrees.    CARDIOVASCULAR: 1 liter of LR NS bolus. Then assess volume status. ECHO.  Cardiac enzyme 2 set.     GI: GI prophylaxis.  Feeding as tolerated    RENAL:  Follow up lytes.  Correct as needed    INFECTIOUS DISEASE: Follow up cultures. Continue with meropenem and Zyvox for now. Burn consult for wound care. Wound culture. ID consult    HEMATOLOGICAL:  DVT prophylaxis. Arterial Duplex lower extremity.     ENDOCRINE:  Follow up FS.      MUSCULOSKELETAL: Bed rest  MICU for now.   RICHA woodward. IMPRESSION:  Sepsis/ septic shock: Improved  Left lower leg open ulcer  Recent Quinolone use    PLAN:    CNS: No sedation. Continue with home dose of Gabapentin.     HEENT: Oral care.    PULMONARY:  HOB @ 45 degrees.    CARDIOVASCULAR: 1 liter of LR NS bolus. Then assess volume status. ECHO.  Cardiac enzyme 2 set.     GI: GI prophylaxis.  Feeding as tolerated    RENAL:  Follow up lytes.  Correct as needed    INFECTIOUS DISEASE: Follow up cultures. Continue with meropenem and Zyvox for now. Burn consult for wound care. Wound culture. ID consult    HEMATOLOGICAL:  DVT prophylaxis. Arterial Duplex lower extremity.     ENDOCRINE:  Follow up FS.      MUSCULOSKELETAL: Bed rest  Downgrade to floor.  RICHA woodward. IMPRESSION:  Sepsis/ septic shock resolved   Left lower leg open ulcer  Recent Quinolone use    PLAN:    CNS: No sedation. Continue with home dose of Gabapentin.     HEENT: Oral care.    PULMONARY:  HOB @ 45 degrees.    CARDIOVASCULAR: 1 liter of LR NS bolus. Then assess volume status. ECHO.  Cardiac enzyme 2 set.     GI: GI prophylaxis.  Feeding as tolerated    RENAL:  Follow up lytes.  Correct as needed    INFECTIOUS DISEASE: Follow up cultures. Continue with meropenem and Zyvox for now. Burn consult for wound care. Wound culture. ID consult    HEMATOLOGICAL:  DVT prophylaxis. Arterial and venous Duplex lower extremity.     ENDOCRINE:  Follow up FS.      MUSCULOSKELETAL: Bed rest  Downgrade to floor.  RICHA woodward.

## 2019-07-11 NOTE — CONSULT NOTE ADULT - SUBJECTIVE AND OBJECTIVE BOX
Patient is a 85y old  Female who presents with a chief complaint of hypotnsion (2019 02:50)      HPI:  86 yo F wth PVD, HTN, chronic stasis ulcer, multiple debridement by Burn p/w 1 day hx of hypotension from NH. Hx was obtained from aide at bedside. Pt has been lethargic for the past 3 days w/ reduced po intake. Pt had a tmax of 103F at NH. They checked bP today and was in low 70s so was sent to ED. Pt also was complaining of vague abdominal pain but no dysuria or frequency or urgency. no productive cough, wegiht loss or hemoptysis or red blood per rectum (2019 02:50)      PAST MEDICAL & SURGICAL HISTORY:  PVD (peripheral vascular disease)  Skin ulcer of left ankle, limited to breakdown of skin  Leg swelling  HTN (hypertension)  History of hip replacement.    FAMILY HISTORY:  :  No known cardiovacular family hisotry     ROS:  See HPI     Allergies    vancomycin ([place])    PHYSICAL EXAM    ICU Vital Signs Last 24 Hrs  T(C): 38.3 (2019 05:30), Max: 38.3 (2019 05:30)  T(F): 101 (2019 05:30), Max: 101 (2019 05:30)  HR: 112 (2019 06:31) (60 - 120)  BP: 137/86 (2019 06:31) (82/49 - 142/67)  RR: 18 (2019 06:31) (16 - 18)  SpO2: 98% (2019 06:31) (96% - 99%)      General: In NAD   HEENT:  HSEBA              Lymphatic system: No cervical LN   Lungs: Bilateral BS  Cardiovascular: Regular  Gastrointestinal: Soft, Positive BS  Musculoskeletal: No clubbing.    Skin: Warm.  Intact. Left leg open ulcer.  Neurological: No motor or sensory deficit       07-10-19 @ 07:01  -  19 @ 07:00  --------------------------------------------------------  IN:  Total IN: 0 mL    OUT:    Indwelling Catheter - Urethral: 900 mL  Total OUT: 900 mL    Total NET: -900 mL          LABS:                          8.1    5.76  )-----------( 487      ( 2019 00:05 )             25.3                                               07-11    136  |  101  |  22<H>  ----------------------------<  134<H>  5.0   |  22  |  0.8    Ca    8.1<L>      2019 00:05    TPro  5.4<L>  /  Alb  2.6<L>  /  TBili  0.4  /  DBili  x   /  AST  13  /  ALT  <5  /  AlkPhos  100  07-11                                             Urinalysis Basic - ( 2019 04:20 )    Color: Yellow / Appearance: Cloudy / S.010 / pH: x  Gluc: x / Ketone: Negative  / Bili: Negative / Urobili: 0.2   Blood: x / Protein: Negative / Nitrite: Negative   Leuk Esterase: Moderate / RBC: x / WBC 5 /HPF   Sq Epi: x / Non Sq Epi: x / Bacteria: Occasional                                              LIVER FUNCTIONS - ( 2019 00:05 )  Alb: 2.6 g/dL / Pro: 5.4 g/dL / ALK PHOS: 100 U/L / ALT: <5 U/L / AST: 13 U/L / GGT: x                                                     X-Rays. No opacity                                                                                     ECHO    MEDICATIONS  (STANDING):  aspirin enteric coated 81 milliGRAM(s) Oral daily  collagenase Ointment 1 Application(s) Topical daily  docusate sodium 100 milliGRAM(s) Oral daily  enoxaparin Injectable 40 milliGRAM(s) SubCutaneous daily  gabapentin 300 milliGRAM(s) Oral at bedtime  meropenem  IVPB 500 milliGRAM(s) IV Intermittent every 8 hours  norepinephrine Infusion 0.05 MICROgram(s)/kG/Min (7.5 mL/Hr) IV Continuous <Continuous>  pantoprazole    Tablet 40 milliGRAM(s) Oral before breakfast  senna 2 Tablet(s) Oral at bedtime  simvastatin 20 milliGRAM(s) Oral at bedtime    MEDICATIONS  (PRN):

## 2019-07-11 NOTE — ED PROVIDER NOTE - PROGRESS NOTE DETAILS
Case endorsed to [facility] pending UA, re-eval, ICU consult, dispo. pt persistently hypotensive despite 3L IVF. peripheral levo prepared and pts bp improved to 100 systolic, peripheral levo at bedside but not currently running. pt accepted to the icu dr moreau, requesting ct abdomen which is ordered. bedside us shows bladder with fluid, will obtain urine admit to icu radiology called to discuss ct results given enlarged lymph nodes and concern for lymphoma. this was relayed to the icu team by me. janet

## 2019-07-12 DIAGNOSIS — Z02.9 ENCOUNTER FOR ADMINISTRATIVE EXAMINATIONS, UNSPECIFIED: ICD-10-CM

## 2019-07-12 LAB
ANION GAP SERPL CALC-SCNC: 11 MMOL/L — SIGNIFICANT CHANGE UP (ref 7–14)
BASOPHILS # BLD AUTO: 0.03 K/UL — SIGNIFICANT CHANGE UP (ref 0–0.2)
BASOPHILS NFR BLD AUTO: 0.4 % — SIGNIFICANT CHANGE UP (ref 0–1)
BUN SERPL-MCNC: 20 MG/DL — SIGNIFICANT CHANGE UP (ref 10–20)
CALCIUM SERPL-MCNC: 7.8 MG/DL — LOW (ref 8.5–10.1)
CHLORIDE SERPL-SCNC: 100 MMOL/L — SIGNIFICANT CHANGE UP (ref 98–110)
CO2 SERPL-SCNC: 23 MMOL/L — SIGNIFICANT CHANGE UP (ref 17–32)
CREAT SERPL-MCNC: 0.8 MG/DL — SIGNIFICANT CHANGE UP (ref 0.7–1.5)
CULTURE RESULTS: NO GROWTH — SIGNIFICANT CHANGE UP
EOSINOPHIL # BLD AUTO: 0.14 K/UL — SIGNIFICANT CHANGE UP (ref 0–0.7)
EOSINOPHIL NFR BLD AUTO: 2 % — SIGNIFICANT CHANGE UP (ref 0–8)
GAS PNL BLDA: SIGNIFICANT CHANGE UP
GLUCOSE SERPL-MCNC: 142 MG/DL — HIGH (ref 70–99)
HCT VFR BLD CALC: 25.7 % — LOW (ref 37–47)
HGB BLD-MCNC: 8 G/DL — LOW (ref 12–16)
IMM GRANULOCYTES NFR BLD AUTO: 0.4 % — HIGH (ref 0.1–0.3)
LACTATE SERPL-SCNC: 2.4 MMOL/L — HIGH (ref 0.5–2.2)
LYMPHOCYTES # BLD AUTO: 0.26 K/UL — LOW (ref 1.2–3.4)
LYMPHOCYTES # BLD AUTO: 3.7 % — LOW (ref 20.5–51.1)
MAGNESIUM SERPL-MCNC: 2.1 MG/DL — SIGNIFICANT CHANGE UP (ref 1.8–2.4)
MCHC RBC-ENTMCNC: 27.4 PG — SIGNIFICANT CHANGE UP (ref 27–31)
MCHC RBC-ENTMCNC: 31.1 G/DL — LOW (ref 32–37)
MCV RBC AUTO: 88 FL — SIGNIFICANT CHANGE UP (ref 81–99)
MONOCYTES # BLD AUTO: 0.89 K/UL — HIGH (ref 0.1–0.6)
MONOCYTES NFR BLD AUTO: 12.6 % — HIGH (ref 1.7–9.3)
NEUTROPHILS # BLD AUTO: 5.69 K/UL — SIGNIFICANT CHANGE UP (ref 1.4–6.5)
NEUTROPHILS NFR BLD AUTO: 80.9 % — HIGH (ref 42.2–75.2)
NRBC # BLD: 0 /100 WBCS — SIGNIFICANT CHANGE UP (ref 0–0)
PLATELET # BLD AUTO: 512 K/UL — HIGH (ref 130–400)
POTASSIUM SERPL-MCNC: 5.1 MMOL/L — HIGH (ref 3.5–5)
POTASSIUM SERPL-SCNC: 5.1 MMOL/L — HIGH (ref 3.5–5)
RBC # BLD: 2.92 M/UL — LOW (ref 4.2–5.4)
RBC # FLD: 16.1 % — HIGH (ref 11.5–14.5)
SODIUM SERPL-SCNC: 134 MMOL/L — LOW (ref 135–146)
SPECIMEN SOURCE: SIGNIFICANT CHANGE UP
WBC # BLD: 7.04 K/UL — SIGNIFICANT CHANGE UP (ref 4.8–10.8)
WBC # FLD AUTO: 7.04 K/UL — SIGNIFICANT CHANGE UP (ref 4.8–10.8)

## 2019-07-12 PROCEDURE — 99223 1ST HOSP IP/OBS HIGH 75: CPT | Mod: AI

## 2019-07-12 RX ORDER — ACETAMINOPHEN 500 MG
650 TABLET ORAL ONCE
Refills: 0 | Status: COMPLETED | OUTPATIENT
Start: 2019-07-12 | End: 2019-07-12

## 2019-07-12 RX ORDER — MIDODRINE HYDROCHLORIDE 2.5 MG/1
5 TABLET ORAL THREE TIMES A DAY
Refills: 0 | Status: DISCONTINUED | OUTPATIENT
Start: 2019-07-12 | End: 2019-07-15

## 2019-07-12 RX ADMIN — Medication 100 MILLIGRAM(S): at 12:50

## 2019-07-12 RX ADMIN — LINEZOLID 300 MILLIGRAM(S): 600 INJECTION, SOLUTION INTRAVENOUS at 05:44

## 2019-07-12 RX ADMIN — MIDODRINE HYDROCHLORIDE 10 MILLIGRAM(S): 2.5 TABLET ORAL at 05:44

## 2019-07-12 RX ADMIN — ENOXAPARIN SODIUM 40 MILLIGRAM(S): 100 INJECTION SUBCUTANEOUS at 12:50

## 2019-07-12 RX ADMIN — MIDODRINE HYDROCHLORIDE 5 MILLIGRAM(S): 2.5 TABLET ORAL at 21:24

## 2019-07-12 RX ADMIN — SENNA PLUS 2 TABLET(S): 8.6 TABLET ORAL at 21:24

## 2019-07-12 RX ADMIN — MEROPENEM 100 MILLIGRAM(S): 1 INJECTION INTRAVENOUS at 05:44

## 2019-07-12 RX ADMIN — GABAPENTIN 300 MILLIGRAM(S): 400 CAPSULE ORAL at 21:24

## 2019-07-12 RX ADMIN — Medication 1 APPLICATION(S): at 12:50

## 2019-07-12 RX ADMIN — Medication 650 MILLIGRAM(S): at 13:00

## 2019-07-12 RX ADMIN — MIDODRINE HYDROCHLORIDE 10 MILLIGRAM(S): 2.5 TABLET ORAL at 14:06

## 2019-07-12 RX ADMIN — MEROPENEM 100 MILLIGRAM(S): 1 INJECTION INTRAVENOUS at 14:07

## 2019-07-12 RX ADMIN — SIMVASTATIN 20 MILLIGRAM(S): 20 TABLET, FILM COATED ORAL at 21:24

## 2019-07-12 RX ADMIN — PANTOPRAZOLE SODIUM 40 MILLIGRAM(S): 20 TABLET, DELAYED RELEASE ORAL at 12:49

## 2019-07-12 RX ADMIN — Medication 81 MILLIGRAM(S): at 12:49

## 2019-07-12 RX ADMIN — MEROPENEM 100 MILLIGRAM(S): 1 INJECTION INTRAVENOUS at 22:05

## 2019-07-12 RX ADMIN — Medication 650 MILLIGRAM(S): at 00:14

## 2019-07-12 NOTE — CONSULT NOTE ADULT - ASSESSMENT
ASSESSMENT  84 yo F PVD, HTN, chronic stasis ulcer, multiple debridement by Burn, last wcx with pseudomonas p/w 1 day hx of hypotension from NH.  Pt has been lethargic for the past 3 days w/ reduced po intake. Pt had a tmax of 103F at NH. Was on abx for ESBL in urine 7/8   Pt also was complaining of vague abdominal pain but no dysuria or frequency or urgency.   Tmax last 24 hours is 103, lactate has been within normal range.  started on broad spectrum ABx     # Septic shock unclear etiology. 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   - f/u cultures  - Oncology evaluation for possible underlying malignancy , check LDH, uric acid

## 2019-07-12 NOTE — PROGRESS NOTE ADULT - ASSESSMENT
IMPRESSION:    -ESBL ecoli -Urine culture at NH +ve 50k ESBL Ecoli   -sepsis/septic shock -resolved   -Left lower leg ulcer w h/o +pseudomonas - off levaquin now   -diffuse calcified lymphadenopathy with B symptoms (subjective fever, wt loss, night sweats )- r/o malignancy/autoimmune process???    PLAN:    CNS: No sedation. Continue with home dose of Gabapentin.     HEENT: Oral care.    PULMONARY: vascular venous LE duplex reported negative .                       HOB @ 45 degrees                       aspiration precautions                       speech and swallow                        repeat lactate                         repeat CXR     CARDIOVASCULAR: off levophed - not hypotensive, keep MAP >65                                C/w IVF @ 75 ml/hr until patient starts tolerating diet                                c/w midodrine                                2 d ECHO.                               Cardiac enzyme 1 more set.     GI: GI prophylaxis.  Feeding as tolerated    RENAL: monitor Urine output .Follow up lytes.  Correct as needed    INFECTIOUS DISEASE: Blood culture and urine cx x 1 negative, repeat cultures                                   add doxycycline .Continue with meropenem .                                    f/u recs as per ID                                     Burn consult for wound care.                                    f/u Wound culture                                     repeat blood work including cbc, bmp                                     xray leg reviewed - no OM    HEMATOLOGICAL: diffuse Lymphadenopathy with subjective fevers, wt loss, night sweat - get hem-onc eval                                        age appropriate malignancy work up as outpatient                                          DVT prophylaxis                                   -check iron studies , occult blood stool                                  - transfuse to keep Hb >7     ENDOCRINE:  Follow up FS.      MUSCULOSKELETAL: OOB to chair   advance directives    patient currently hemodynamically stable ,No MICU for now ,monitor on Telemetry   will continue to follow

## 2019-07-12 NOTE — PROGRESS NOTE ADULT - ASSESSMENT
The patient is an 86 y/o F w/ a PMH of chronic stasis ulcer of the left lower extremity s/p multiple debridements, HTN, PVD, presenting for lethargy and decreased PO intake of 3 days' duration and admitted for septic shock likely secondary to stasis ulcer vs pneumonia.     # Septic shock secondary to stasis ulcer vs pneumonia, improving  BP normalizing and off Levophed drip as of this AM; tachycardic to 106 in early PM, no leukocytosis, satting 96-97% on rm air  CXR revealed a left basilar opacity  C/w meropenem 500 mg IV q8h; linezolid d/c'd as per ID  Lactate uptrending 3.7 <-- 1.0  C/w midodrine 10 mg TID; titrate down as tolerated  IV fluids d/c'd  Vitals per routine  F/u ID  F/u AM CBC    # Normocytic anemia, possible of chronic disease  Hb 7.7 (baseline 10.4) MCV 85  F/u AM CBC    #PVD  C/w asa and statin     # H/o HTN, currently normotensive  Was hypotensive yesterday and s/p multiple fluid boluses, Levophed drip (currently d/c'd)  IV fluids d/c'd  Vitals per routine    #Activitiy; bed rest  #Diet: dash  #DVT/GI PPX: Lovenox/protonix  #dispo: acute; from nursing home  #Code; Full  #CHG

## 2019-07-12 NOTE — PROGRESS NOTE ADULT - SUBJECTIVE AND OBJECTIVE BOX
KALI ANDERS 85y Female  MRN#: [id number]       SUBJECTIVE  Patient is a 85y old Female who presents with a chief complaint of hypotension (2019 09:23)  Currently admitted to medicine with the primary diagnosis of sepsis.  Present Today:           Saha Catheter ()No/ ()Yes? Indication:          Central Line ()No/ ()Yes? Indication:          IV Fluids ()No/ ()Yes? Type:  Rate:  Indication:      OBJECTIVE  PAST MEDICAL & SURGICAL HISTORY  PVD (peripheral vascular disease)  Skin ulcer of left ankle, limited to breakdown of skin  Leg swelling  HTN (hypertension)  History of hip replacement    ALLERGIES:  vancomycin ([place])    MEDICATIONS:  STANDING MEDICATIONS  aspirin enteric coated 81 milliGRAM(s) Oral daily  collagenase Ointment 1 Application(s) Topical daily  docusate sodium 100 milliGRAM(s) Oral daily  enoxaparin Injectable 40 milliGRAM(s) SubCutaneous daily  gabapentin 300 milliGRAM(s) Oral at bedtime  meropenem  IVPB 500 milliGRAM(s) IV Intermittent every 8 hours  midodrine 10 milliGRAM(s) Oral three times a day  pantoprazole    Tablet 40 milliGRAM(s) Oral before breakfast  senna 2 Tablet(s) Oral at bedtime  simvastatin 20 milliGRAM(s) Oral at bedtime    PRN MEDICATIONS      VITAL SIGNS: Last 24 Hours  T(C): 38.6 (2019 13:02), Max: 39.4 (2019 00:15)  T(F): 101.4 (2019 13:02), Max: 103 (2019 00:15)  HR: 106 (2019 13:02) (64 - 106)  BP: 150/72 (2019 13:02) (99/54 - 150/72)  BP(mean): 78 (2019 18:20) (73 - 81)  RR: 20 (2019 13:02) (18 - 20)  SpO2: 96% (2019 13:02) (96% - 99%)    LABS:                        7.7    8.37  )-----------( 488      ( 2019 11:23 )             24.4     07-11    133<L>  |  100  |  19  ----------------------------<  198<H>  4.9   |  22  |  0.8    Ca    7.8<L>      2019 11:23    TPro  5.4<L>  /  Alb  2.6<L>  /  TBili  0.4  /  DBili  x   /  AST  13  /  ALT  <5  /  AlkPhos  100  07-11      Urinalysis Basic - ( 2019 04:20 )    Color: Yellow / Appearance: Cloudy / S.010 / pH: x  Gluc: x / Ketone: Negative  / Bili: Negative / Urobili: 0.2   Blood: x / Protein: Negative / Nitrite: Negative   Leuk Esterase: Moderate / RBC: x / WBC 5 /HPF   Sq Epi: x / Non Sq Epi: x / Bacteria: Occasional            Culture - Blood (collected 2019 06:05)  Source: .Blood None  Preliminary Report (2019 14:01):    No growth to date.    Culture - Urine (collected 2019 04:20)  Source: .Urine Clean Catch (Midstream)  Final Report (2019 08:44):    No growth    Culture - Blood (collected 2019 00:05)  Source: .Blood Blood-Peripheral  Preliminary Report (2019 06:01):    No growth to date.    Culture - Blood (collected 2019 00:05)  Source: .Blood Blood-Peripheral  Preliminary Report (2019 06:01):    No growth to date.      CARDIAC MARKERS ( 2019 11:23 )  x     / <0.01 ng/mL / x     / x     / <1.0 ng/mL      RADIOLOGY:      PHYSICAL EXAM:    GENERAL: NAD, well-developed, AAOx3  HEENT:  Atraumatic, Normocephalic. EOMI, PERRLA, conjunctiva and sclera clear, No JVD  PULMONARY: Clear to auscultation bilaterally; No wheeze  CARDIOVASCULAR: Regular rate and rhythm; No murmurs, rubs, or gallops  GASTROINTESTINAL: Soft, Nontender, Nondistended; Bowel sounds present  MUSCULOSKELETAL:  2+ Peripheral Pulses, No clubbing, cyanosis, or edema  NEUROLOGY: non-focal  SKIN: No rashes or lesions KALI ANDERS 85y Female  MRN#: [id number]       SUBJECTIVE  Patient is a 85y old Female who presents with a chief complaint of hypotension (2019 09:23)  Currently admitted to medicine with the primary diagnosis of septic shock likely secondary to left leg wound vs pneumonia. In the AM she reported no complaints; denied subj fever, chills, chest pain, abdominal pain, abnormal bowel habits.    OBJECTIVE  PAST MEDICAL & SURGICAL HISTORY  PVD (peripheral vascular disease)  Skin ulcer of left ankle, limited to breakdown of skin  Leg swelling  HTN (hypertension)  History of hip replacement    ALLERGIES:  vancomycin ([place])    MEDICATIONS:  STANDING MEDICATIONS  aspirin enteric coated 81 milliGRAM(s) Oral daily  collagenase Ointment 1 Application(s) Topical daily  docusate sodium 100 milliGRAM(s) Oral daily  enoxaparin Injectable 40 milliGRAM(s) SubCutaneous daily  gabapentin 300 milliGRAM(s) Oral at bedtime  meropenem  IVPB 500 milliGRAM(s) IV Intermittent every 8 hours  midodrine 10 milliGRAM(s) Oral three times a day  pantoprazole    Tablet 40 milliGRAM(s) Oral before breakfast  senna 2 Tablet(s) Oral at bedtime  simvastatin 20 milliGRAM(s) Oral at bedtime    PRN MEDICATIONS      VITAL SIGNS: Last 24 Hours  T(C): 38.6 (2019 13:02), Max: 39.4 (2019 00:15)  T(F): 101.4 (2019 13:02), Max: 103 (2019 00:15)  HR: 106 (2019 13:02) (64 - 106)  BP: 150/72 (2019 13:02) (99/54 - 150/72)  BP(mean): 78 (2019 18:20) (73 - 81)  RR: 20 (2019 13:02) (18 - 20)  SpO2: 96% (2019 13:02) (96% - 99%)    LABS:                        7.7    8.37  )-----------( 488      ( 2019 11:23 )             24.4     07-11    133<L>  |  100  |  19  ----------------------------<  198<H>  4.9   |  22  |  0.8    Ca    7.8<L>      2019 11:23    TPro  5.4<L>  /  Alb  2.6<L>  /  TBili  0.4  /  DBili  x   /  AST  13  /  ALT  <5  /  AlkPhos  100  07-11      Urinalysis Basic - ( 2019 04:20 )    Color: Yellow / Appearance: Cloudy / S.010 / pH: x  Gluc: x / Ketone: Negative  / Bili: Negative / Urobili: 0.2   Blood: x / Protein: Negative / Nitrite: Negative   Leuk Esterase: Moderate / RBC: x / WBC 5 /HPF   Sq Epi: x / Non Sq Epi: x / Bacteria: Occasional      Culture - Blood (collected 2019 06:05)  Source: .Blood None  Preliminary Report (2019 14:01):    No growth to date.    Culture - Urine (collected 2019 04:20)  Source: .Urine Clean Catch (Midstream)  Final Report (2019 08:44):    No growth    Culture - Blood (collected 2019 00:05)  Source: .Blood Blood-Peripheral  Preliminary Report (2019 06:01):    No growth to date.    Culture - Blood (collected 2019 00:05)  Source: .Blood Blood-Peripheral  Preliminary Report (2019 06:01):    No growth to date.      CARDIAC MARKERS ( 2019 11:23 )  x     / <0.01 ng/mL / x     / x     / <1.0 ng/mL      PHYSICAL EXAM:    GENERAL: NAD, well-developed, AAOx3  HEENT:  Atraumatic, Normocephalic  PULMONARY: Clear to auscultation bilaterally; No wheeze  CARDIOVASCULAR: Regular rate and rhythm; No murmurs, rubs, or gallops  GASTROINTESTINAL: Soft, Nontender, Nondistended; Bowel sounds present  MUSCULOSKELETAL: No clubbing, cyanosis, or edema  SKIN: No rashes or lesions

## 2019-07-12 NOTE — PROGRESS NOTE ADULT - SUBJECTIVE AND OBJECTIVE BOX
Patient is a 85y old  Female who presented from NH with a chief complaint of hypotension (2019 15:12)    HPI : 84 yo F with h/o PVD, HTN,  LLE chronic stasis ulcer, multiple debridement by Burn presented to ED from NH for 1 day hx of hypotension. as per family friend at bedside  Patient has been lethargic for the past 3 days w/ reduced po intake. Pt had a tmax of 103F at NH , Urine culture at NH was +ve ESBL Ecoli and  bP at NH on the day of presentation  was in low 70s so patient was sent to ED. Pt also was complaining of vague abdominal pain but no dysuria or frequency or urgency. h/o chills but no fever, night sweats+  and h/o  "not significant " weight loss reported by family friend. no productive cough, melena, diarrhea , headache ,hemoptysis or red blood per rectum reported .   Patient initially received 3.2 L fluid bolus on admission and when BP didnt improve patient was started on levophed which was stopped after 3-4 hours . Critical care was recalled as patient again had low BP readings today for which levophed was initiated but stopped thereafter. Patient denies any chest pain, palpitations, SOB . Patient was recently discharged on levofloxacin for LLE wound growing pseudomonas  During this stay patient has been evaluated by ID and currently on meropenem. No new symptoms. Blood culture and urine culture -ve x 1 .     ROS:  See HPI    PHYSICAL EXAM    ICU Vital Signs Last 24 Hrs  T(C): 36.9 (2019 15:47), Max: 39.4 (2019 00:15)  T(F): 98.4 (2019 15:47), Max: 103 (2019 00:15)  HR: 80 (2019 15:47) (74 - 106)  BP: 99/54 (2019 15:47) (97/50 - 150/72)  BP(mean): 78 (2019 18:20) (78 - 81)  ABP: --  ABP(mean): --  RR: 18 (2019 15:47) (18 - 20)  SpO2: 97% on RA      General:NAD   HEENT: SHEBA             Lungs: Bilateral BS+  Cardiovascular: Regular , systolic murmur+ Lt 2nd ICS  Gastrointestinal: Soft, Positive BS  Extremities: No clubbing.  Moves extremities.  Full Range of motion   Skin: Warm, intact  Neurological: No motor or sensory deficit       19 @ 07:01  -  19 @ 07:00  --------------------------------------------------------  IN:    IV PiggyBack: 300 mL  Total IN: 300 mL    OUT:    Indwelling Catheter - Urethral: 920 mL  Total OUT: 920 mL    Total NET: -620 mL      19 @ 07:01  -  19 @ 16:44  --------------------------------------------------------  IN:  Total IN: 0 mL    OUT:    Indwelling Catheter - Urethral: 450 mL  Total OUT: 450 mL    Total NET: -450 mL          LABS:                            7.7    8.37  )-----------( 488      ( 2019 11:23 )             24.4                                               07-11    133<L>  |  100  |  19  ----------------------------<  198<H>  4.9   |  22  |  0.8    Ca    7.8<L>      2019 11:23    TPro  5.4<L>  /  Alb  2.6<L>  /  TBili  0.4  /  DBili  x   /  AST  13  /  ALT  <5  /  AlkPhos  100  07-11                                             Urinalysis Basic - ( 2019 04:20 )    Color: Yellow / Appearance: Cloudy / S.010 / pH: x  Gluc: x / Ketone: Negative  / Bili: Negative / Urobili: 0.2   Blood: x / Protein: Negative / Nitrite: Negative   Leuk Esterase: Moderate / RBC: x / WBC 5 /HPF   Sq Epi: x / Non Sq Epi: x / Bacteria: Occasional        CARDIAC MARKERS ( 2019 11:23 )  x     / <0.01 ng/mL / x     / x     / <1.0 ng/mL                                            LIVER FUNCTIONS - ( 2019 00:05 )  Alb: 2.6 g/dL / Pro: 5.4 g/dL / ALK PHOS: 100 U/L / ALT: <5 U/L / AST: 13 U/L / GGT: x                                                  Culture - Blood (collected 2019 06:05)  Source: .Blood None  Preliminary Report (2019 14:01):    No growth to date.    Culture - Urine (collected 2019 04:20)  Source: .Urine Clean Catch (Midstream)  Final Report (2019 08:44):    No growth    Culture - Blood (collected 2019 00:05)  Source: .Blood Blood-Peripheral  Preliminary Report (2019 06:01):    No growth to date.    Culture - Blood (collected 2019 00:05)  Source: .Blood Blood-Peripheral  Preliminary Report (2019 06:01):    No growth to date.                                                                                       ABG - ( 2019 15:28 )  pH, Arterial: 7.45  pH, Blood: x     /  pCO2: 33    /  pO2: 79    / HCO3: 23    / Base Excess: -1.0  /  SaO2: 97              < from: CT Abdomen and Pelvis w/ IV Cont (19 @ 05:02) >  1.  Since 10/4/2017, interval enlargement in multistation calcified   lymphadenopathy as described above.    2.  New mild left-sided hydroureteronephrosis likely secondary to   encasement by enlarged retroperitoneal lymph nodes.    3.  Small/trace bilateral pleural effusions.    4.Cholelithiasis.    < end of copied text >      MEDICATIONS  (STANDING):  aspirin enteric coated 81 milliGRAM(s) Oral daily  collagenase Ointment 1 Application(s) Topical daily  docusate sodium 100 milliGRAM(s) Oral daily  enoxaparin Injectable 40 milliGRAM(s) SubCutaneous daily  gabapentin 300 milliGRAM(s) Oral at bedtime  meropenem  IVPB 500 milliGRAM(s) IV Intermittent every 8 hours  midodrine 10 milliGRAM(s) Oral three times a day  pantoprazole    Tablet 40 milliGRAM(s) Oral before breakfast  senna 2 Tablet(s) Oral at bedtime  simvastatin 20 milliGRAM(s) Oral at bedtime    MEDICATIONS  (PRN):      Xrays:             < from: Xray Chest 1 View-PORTABLE IMMEDIATE (19 @ 01:36) >  Left basilar opacity.    Likely mediastinal lymphadenopathy.      < end of copied text >

## 2019-07-12 NOTE — CONSULT NOTE ADULT - SUBJECTIVE AND OBJECTIVE BOX
KALI ANDERS  85y, Female  Allergy: vancomycin (Flushing)      CHIEF COMPLAINT: hypotnsion (2019 07:40)      HPI:  86 yo F wth PVD, HTN, chronic stasis ulcer, multiple debridement by Burn p/w 1 day hx of hypotension from NH. Hx was obtained from aide at bedside. Pt has been lethargic for the past 3 days w/ reduced po intake. Pt had a tmax of 103F at NH. They checked bP today and was in low 70s so was sent to ED. Pt also was complaining of vague abdominal pain but no dysuria or frequency or urgency. no productive cough, wegiht loss or hemoptysis or red blood per rectum (2019 02:50)    FAMILY HISTORY:    PAST MEDICAL & SURGICAL HISTORY:  PVD (peripheral vascular disease)  Skin ulcer of left ankle, limited to breakdown of skin  Leg swelling  HTN (hypertension)  History of hip replacement      SOCIAL HISTORY    Substance Use (  ) never used  (  ) IVDU (  ) Other:  Tobacco Usage:  (   ) never smoked   (   ) former smoker   (   ) current smoker   Alcohol Usage: (   ) social  (   ) daily use (   ) denies  Sexual History:       ROS  General: Denies rigors, nightsweats  HEENT: Denies headache, rhinorrhea, sore throat, eye pain  CV: Denies CP, palpitations  PULM: Denies SOB, wheezing  GI: Denies abdominal pain, hematochezia/melena  : Denies dysuria, hematuria  MSK: Denies arthralgias, myalgias  SKIN: Denies rash, lesions  NEURO: Denies paresthesias, weakness  PSYCH: Denies depression, anxiety    VITALS:  T(F): 97, Max: 103 (19 @ 00:15)  HR: 84  BP: 128/62  RR: 18Vital Signs Last 24 Hrs  T(C): 36.1 (2019 08:37), Max: 39.4 (2019 00:15)  T(F): 97 (2019 08:37), Max: 103 (2019 00:15)  HR: 84 (2019 08:37) (64 - 102)  BP: 128/62 (2019 08:51) (75/47 - 148/76)  BP(mean): 78 (2019 18:20) (56 - 81)  RR: 18 (2019 08:51) (18 - 25)  SpO2: 98% (2019 08:51) (96% - 99%)    PHYSICAL EXAM:  Gen: NAD, resting in bed  HEENT: Normocephalic, atraumatic  Neck: supple, no lymphadenopathy  CV: Regular rate & regular rhythm  Lungs: decreased BS at bases  Abdomen: Soft, BS present  Ext: Warm, well perfused  Neuro: non focal, awake  Skin: no rash, no erythema    TESTS & MEASUREMENTS:                        7.7    8.37  )-----------( 488      ( 2019 11:23 )             24.4         133<L>  |  100  |  19  ----------------------------<  198<H>  4.9   |  22  |  0.8    Ca    7.8<L>      2019 11:23    TPro  5.4<L>  /  Alb  2.6<L>  /  TBili  0.4  /  DBili  x   /  AST  13  /  ALT  <5  /  AlkPhos  100      eGFR if Non African American: 67 mL/min/1.73M2 (19 @ 11:23)  eGFR if : 78 mL/min/1.73M2 (19 @ 11:23)    LIVER FUNCTIONS - ( 2019 00:05 )  Alb: 2.6 g/dL / Pro: 5.4 g/dL / ALK PHOS: 100 U/L / ALT: <5 U/L / AST: 13 U/L / GGT: x           Urinalysis Basic - ( 2019 04:20 )    Color: Yellow / Appearance: Cloudy / S.010 / pH: x  Gluc: x / Ketone: Negative  / Bili: Negative / Urobili: 0.2   Blood: x / Protein: Negative / Nitrite: Negative   Leuk Esterase: Moderate / RBC: x / WBC 5 /HPF   Sq Epi: x / Non Sq Epi: x / Bacteria: Occasional        Culture - Urine (collected 19 @ 04:20)  Source: .Urine Clean Catch (Midstream)  Final Report (19 @ 08:44):    No growth    Culture - Blood (collected 19 @ 00:05)  Source: .Blood Blood-Peripheral  Preliminary Report (19 @ 06:01):    No growth to date.    Culture - Blood (collected 19 @ 00:05)  Source: .Blood Blood-Peripheral  Preliminary Report (19 @ 06:01):    No growth to date.        Blood Gas Venous - Lactate: 1.0 mmoL/L (19 @ 00:05)      INFECTIOUS DISEASES TESTING      RADIOLOGY & ADDITIONAL TESTS:  I have personally reviewed the last Chest xray  CXR      CT  CT Abdomen and Pelvis w/ IV Cont:   EXAM:  CT ABDOMEN AND PELVIS IC            PROCEDURE DATE:  2019            INTERPRETATION:  CLINICAL STATEMENT: Hypotension and abdominal pain.    TECHNIQUE: Contiguous axial CT images were obtained from the lower chest   to the pubic symphysis following administration of 100cc Optiray 320   intravenous contrast.  Oral contrast was not administered.  Reformatted   images in the coronal and sagittal planes were acquired.    COMPARISON: CT abdomen and pelvis 2017.      FINDINGS:    LOWER CHEST: Small bilateral pleural effusions with overlying   subsegmental atelectasis.      HEPATOBILIARY: No suspicious parenchymal lesion or biliary ductal   dilatation. Cholelithiasis..    SPLEEN: Redemonstrated multiple irregular hypodensities within the   spleen, nonspecific.    PANCREAS: Unremarkable.    ADRENAL GLANDS: Unremarkable.    KIDNEYS: Symmetric renal enhancement. No hydroureteronephrosis. Mild   left-sided hydroureteronephrosis without evidence of obstructing calculus.    ABDOMINOPELVIC NODES: Interval enlargement of multiple calcified lymph   nodes within the retroperitoneal, periaortic, bilateral iliac, bilateral   pelvic sidewall and bilateral inguinal stations. Largest lymph node   within the periaortic space measures 3.6 x 1.6 cm (series 6, image 27),   largest within the pelvis is along the left external iliac chain   measuring 3.6 x 2.2 cm (series 3, image 63), and largest in the inguinal   stations is on the left measuring 2.5 x 2.1 cm (series 3, image 85).    PELVIC ORGANS: Calcified fibroid uterus. Saha catheter within the   urinary bladder.    PERITONEUM/MESENTERY/BOWEL: No bowel obstruction, pneumoperitoneum or   ascites. Colonic diverticulosis without evidence of   diverticulitis.Nonspecific presacral edema, new    BONES/SOFT TISSUES: Bones are diffusely osteopenic. Post total left hip   arthroplasty. Degenerative changes of the spine.    OTHER: Normal caliber aorta. Atherosclerotic disease.      IMPRESSION:     1.  Since 10/4/2017, interval enlargement in multistation calcified   lymphadenopathy as described above.    2.  New mild left-sided hydroureteronephrosis likely secondary to   encasement by enlarged retroperitoneal lymph nodes.    3.  Small/trace bilateral pleural effusions.    4.Cholelithiasis.        Dr. Yao Baptiste discussed preliminary findings with KENNEY KEBEDE MD   on 2019 5:30 AM with readback.              YAO BAPTISTE M.D., RESIDENT RADIOLOGIST  This document has been electronically signed.  ELLIOT LANDAU M.D., ATTENDING RADIOLOGIST  This document has been electronically signed. 2019  6:22AM             (19 @ 05:02)      CARDIOLOGY TESTING  12 Lead ECG:   Ventricular Rate 103 BPM    Atrial Rate 103 BPM    P-R Interval 224 ms    QRS Duration 90 ms    Q-T Interval 334 ms    QTC Calculation(Bezet) 437 ms    P Axis 44 degrees    R Axis 64 degrees    T Axis 17 degrees    Diagnosis Line Sinus tachycardia with 1st degree A-V block  Borderline EKG    Confirmed by Justice Melissa ([id number]) on 2019 10:48:59 AM (19 @ 09:25)  12 Lead ECG:   Ventricular Rate 68 BPM    Atrial Rate 68 BPM    P-R Interval 212 ms    QRS Duration 80 ms    Q-T Interval 438 ms    QTC Calculation(Bezet) 465 ms    P Axis 68 degrees    R Axis 45 degrees    T Axis 28 degrees    Diagnosis Line Sinus rhythm with 1st degree A-V block  Borderline EKG    Confirmed by Justice Melissa ([id number]) on 2019 7:03:16 AM (19 @ 00:19)      MEDICATIONS  aspirin enteric coated 81  collagenase Ointment 1  docusate sodium 100  enoxaparin Injectable 40  gabapentin 300  linezolid  IVPB   linezolid  IVPB 600  meropenem  IVPB 500  midodrine 10  norepinephrine Infusion 0.05  pantoprazole    Tablet 40  senna 2  simvastatin 20      ANTIBIOTICS:  linezolid  IVPB      linezolid  IVPB 600 milliGRAM(s) IV Intermittent every 12 hours  meropenem  IVPB 500 milliGRAM(s) IV Intermittent every 8 hours        ASSESSMENT  86 yo F wth PVD, HTN, chronic stasis ulcer, multiple debridement by Burn p/w 1 day hx of hypotension from NH.  Pt has been lethargic for the past 3 days w/ reduced po intake. Pt had a tmax of 103F at NH.   Pt also was complaining of vague abdominal pain but no dysuria or frequency or urgency.   Tmax last 24 hours is 103, lactate has been within normal range.  on admission bp 90/56, now on pressors  started on broad spectrum ABx   ID and Bun consulted for wound on LLE being possible source of sepsis.    # Sepsis?  - [id number]   - wbc wnl.   - CXR: left basilar opacity, mediastinal widening RA no hypoxia  - LA 1  - left leg wound does not appear infected  - UA  negative   - CTAP w/ iV contrast lymphadenopathy with mild hydronephrosis  -  Xray of left leg, no signs of infection  -  ESR 58 and CRP 14.59  - f/u LE duplex  - blood cultures negative   - LFTs are normal   - Lipase ?    RECOMMENDATIONS  -   - KALI ANDERS  85y, Female  Allergy: vancomycin ([place])      CHIEF COMPLAINT: hypotnsion (2019 07:40)      HPI:  86 yo F wth PVD, HTN, chronic stasis ulcer, multiple debridement by Burn p/w 1 day hx of hypotension from NH. Hx was obtained from aide at bedside. Pt has been lethargic for the past 3 days w/ reduced po intake. Pt had a tmax of 103F at NH. They checked bP today and was in low 70s so was sent to ED. Pt also was complaining of vague abdominal pain but no dysuria or frequency or urgency. no productive cough, wegiht loss or hemoptysis or red blood per rectum (2019 02:50)      PAST MEDICAL & SURGICAL HISTORY:  PVD (peripheral vascular disease)  Skin ulcer of left ankle, limited to breakdown of skin  Leg swelling  HTN (hypertension)  History of hip replacement    ROS  General: Denies rigors, nightsweats  HEENT: Denies headache, rhinorrhea, sore throat, eye pain  CV: Denies CP, palpitations  PULM: Denies SOB, wheezing  GI: Denies abdominal pain, hematochezia/melena  : Denies dysuria, hematuria  MSK: Denies arthralgias, myalgias  SKIN: Denies rash, lesions  NEURO: Denies paresthesias, weakness  PSYCH: Denies depression, anxiety    VITALS:  T(F): 97, Max: 103 (19 @ 00:15)  HR: 84  BP: 128/62  RR: 18Vital Signs Last 24 Hrs  T(C): 36.1 (2019 08:37), Max: 39.4 (2019 00:15)  T(F): 97 (2019 08:37), Max: 103 (2019 00:15)  HR: 84 (2019 08:37) (64 - 102)  BP: 128/62 (2019 08:51) (75/47 - 148/76)  BP(mean): 78 (2019 18:20) (56 - 81)  RR: 18 (2019 08:51) (18 - 25)  SpO2: 98% (2019 08:51) (96% - 99%)    PHYSICAL EXAM:  Gen: NAD, resting in bed  HEENT: Normocephalic, atraumatic  Neck: supple, no lymphadenopathy  CV: murmur at left second intercostal space   Lungs: decreased BS at bases  Abdomen: Soft, BS present  Ext: Warm, well perfused  Neuro: non focal, awake  Skin: no rash, no erythema    TESTS & MEASUREMENTS:                        7.7    8.37  )-----------( 488      ( 2019 11:23 )             24.4         133<L>  |  100  |  19  ----------------------------<  198<H>  4.9   |  22  |  0.8    Ca    7.8<L>      2019 11:23    TPro  5.4<L>  /  Alb  2.6<L>  /  TBili  0.4  /  DBili  x   /  AST  13  /  ALT  <5  /  AlkPhos  100      eGFR if Non African American: 67 mL/min/1.73M2 (19 @ 11:23)  eGFR if : 78 mL/min/1.73M2 (19 @ 11:23)    LIVER FUNCTIONS - ( 2019 00:05 )  Alb: 2.6 g/dL / Pro: 5.4 g/dL / ALK PHOS: 100 U/L / ALT: <5 U/L / AST: 13 U/L / GGT: x           Urinalysis Basic - ( 2019 04:20 )    Color: Yellow / Appearance: Cloudy / S.010 / pH: x  Gluc: x / Ketone: Negative  / Bili: Negative / Urobili: 0.2   Blood: x / Protein: Negative / Nitrite: Negative   Leuk Esterase: Moderate / RBC: x / WBC 5 /HPF   Sq Epi: x / Non Sq Epi: x / Bacteria: Occasional      Culture - Urine (collected 19 @ 04:20)  Source: .Urine Clean Catch (Midstream)  Final Report (19 @ 08:44):    No growth    Culture - Blood (collected 19 @ 00:05)  Source: .Blood Blood-Peripheral  Preliminary Report (19 @ 06:01):    No growth to date.    Culture - Blood (collected 19 @ 00:05)  Source: .Blood Blood-Peripheral  Preliminary Report (19 @ 06:01):    No growth to date.        Blood Gas Venous - Lactate: 1.0 mmoL/L (19 @ 00:05)      INFECTIOUS DISEASES TESTING      RADIOLOGY & ADDITIONAL TESTS:  I have personally reviewed the last Chest xray  CT  CT Abdomen and Pelvis w/ IV Cont:   EXAM:  CT ABDOMEN AND PELVIS IC            PROCEDURE DATE:  2019            INTERPRETATION:  CLINICAL STATEMENT: Hypotension and abdominal pain.    TECHNIQUE: Contiguous axial CT images were obtained from the lower chest   to the pubic symphysis following administration of 100cc Optiray 320   intravenous contrast.  Oral contrast was not administered.  Reformatted   images in the coronal and sagittal planes were acquired.    COMPARISON: CT abdomen and pelvis 2017.      FINDINGS:    LOWER CHEST: Small bilateral pleural effusions with overlying   subsegmental atelectasis.      HEPATOBILIARY: No suspicious parenchymal lesion or biliary ductal   dilatation. Cholelithiasis..    SPLEEN: Redemonstrated multiple irregular hypodensities within the   spleen, nonspecific.    PANCREAS: Unremarkable.    ADRENAL GLANDS: Unremarkable.    KIDNEYS: Symmetric renal enhancement. No hydroureteronephrosis. Mild   left-sided hydroureteronephrosis without evidence of obstructing calculus.    ABDOMINOPELVIC NODES: Interval enlargement of multiple calcified lymph   nodes within the retroperitoneal, periaortic, bilateral iliac, bilateral   pelvic sidewall and bilateral inguinal stations. Largest lymph node   within the periaortic space measures 3.6 x 1.6 cm (series 6, image 27),   largest within the pelvis is along the left external iliac chain   measuring 3.6 x 2.2 cm (series 3, image 63), and largest in the inguinal   stations is on the left measuring 2.5 x 2.1 cm (series 3, image 85).    PELVIC ORGANS: Calcified fibroid uterus. Saha catheter within the   urinary bladder.    PERITONEUM/MESENTERY/BOWEL: No bowel obstruction, pneumoperitoneum or   ascites. Colonic diverticulosis without evidence of   diverticulitis.Nonspecific presacral edema, new    BONES/SOFT TISSUES: Bones are diffusely osteopenic. Post total left hip   arthroplasty. Degenerative changes of the spine.    OTHER: Normal caliber aorta. Atherosclerotic disease.      IMPRESSION:     1.  Since 10/4/2017, interval enlargement in multistation calcified   lymphadenopathy as described above.    2.  New mild left-sided hydroureteronephrosis likely secondary to   encasement by enlarged retroperitoneal lymph nodes.    3.  Small/trace bilateral pleural effusions.    4.Cholelithiasis.        Dr. Yao Baptiste discussed preliminary findings with KENNEY KEBEDE MD   on 2019 5:30 AM with readback.              YAO BAPTISTE M.D., RESIDENT RADIOLOGIST  This document has been electronically signed.  ELLIOT LANDAU M.D., ATTENDING RADIOLOGIST  This document has been electronically signed. 2019  6:22AM             (19 @ 05:02)      CARDIOLOGY TESTING  12 Lead ECG:   Ventricular Rate 103 BPM    Atrial Rate 103 BPM    P-R Interval 224 ms    QRS Duration 90 ms    Q-T Interval 334 ms    QTC Calculation(Bezet) 437 ms    P Axis 44 degrees    R Axis 64 degrees    T Axis 17 degrees    Diagnosis Line Sinus tachycardia with 1st degree A-V block  Borderline EKG    Confirmed by Justice Melissa ([id number]) on 2019 10:48:59 AM (19 @ 09:25)  12 Lead ECG:   Ventricular Rate 68 BPM    Atrial Rate 68 BPM    P-R Interval 212 ms    QRS Duration 80 ms    Q-T Interval 438 ms    QTC Calculation(Bezet) 465 ms    P Axis 68 degrees    R Axis 45 degrees    T Axis 28 degrees    Diagnosis Line Sinus rhythm with 1st degree A-V block  Borderline EKG    Confirmed by Justice Melissa ([id number]) on 2019 7:03:16 AM (19 @ 00:19)      MEDICATIONS  aspirin enteric coated 81  collagenase Ointment 1  docusate sodium 100  enoxaparin Injectable 40  gabapentin 300  linezolid  IVPB   linezolid  IVPB 600  meropenem  IVPB 500  midodrine 10  norepinephrine Infusion 0.05  pantoprazole    Tablet 40  senna 2  simvastatin 20      ANTIBIOTICS:  linezolid  IVPB      linezolid  IVPB 600 milliGRAM(s) IV Intermittent every 12 hours  meropenem  IVPB 500 milliGRAM(s) IV Intermittent every 8 hours        ASSESSMENT  86 yo F wth PVD, HTN, chronic stasis ulcer, multiple debridement by Burn p/w 1 day hx of hypotension from NH.  Pt has been lethargic for the past 3 days w/ reduced po intake. Pt had a tmax of 103F at NH.   Pt also was complaining of vague abdominal pain but no dysuria or frequency or urgency.   Tmax last 24 hours is 103, lactate has been within normal range.  started on broad spectrum ABx       # Sepsis/ septic shock,  secondary to pyelonephritis?, Urine culture at NH grows ESBL Ecoli  - fever 103   - wbc wnl.   - hypotension, on admission bp 90/56, now on pressors, LA 1  - CTAP w/ iV contrast lymphadenopathy with mild hydronephrosis  - left leg wound does not appear infecte  -  Xray of left leg, no signs of infection  -  ESR 58 and CRP 14.59  -  blood cultures negative   -  LFTs are WNl    # Diffuse Lymphadenopathy in chest/ abdominal imaging      RECOMMENDATIONS  - DC Zyvox  - meropenem 500 mg Q 8.   - Oncology evaluation for possible underlying malignancy KALI ANDERS  85y, Female  Allergy: vancomycin (Fl[place])      CHIEF COMPLAINT: hypotnsion (2019 07:40)      HPI:  84 yo F wth PVD, HTN, chronic stasis ulcer, multiple debridement by Burn p/w 1 day hx of hypotension from NH. Hx was obtained from aide at bedside. Pt has been lethargic for the past 3 days w/ reduced po intake. Pt had a tmax of 103F at NH. They checked bP today and was in low 70s so was sent to ED. Pt also was complaining of vague abdominal pain but no dysuria or frequency or urgency. no productive cough, wegiht loss or hemoptysis or red blood per rectum (2019 02:50)      PAST MEDICAL & SURGICAL HISTORY:  PVD (peripheral vascular disease)  Skin ulcer of left ankle, limited to breakdown of skin  Leg swelling  HTN (hypertension)  History of hip replacement    ROS  General: as noted above   HEENT: Denies headache, rhinorrhea, sore throat, eye pain  CV: Denies CP, palpitations  PULM: Denies SOB, wheezing  GI: Denies abdominal pain, hematochezia/melena  : Denies dysuria, hematuria  MSK: Denies arthralgias, myalgias  SKIN: as noted above   NEURO: Denies paresthesias, weakness  PSYCH: Denies depression, anxiety    VITALS:  T(F): 97, Max: 103 (19 @ 00:15)  HR: 84  BP: 128/62  RR: 18Vital Signs Last 24 Hrs  T(C): 36.1 (2019 08:37), Max: 39.4 (2019 00:15)  T(F): 97 (2019 08:37), Max: 103 (2019 00:15)  HR: 84 (2019 08:37) (64 - 102)  BP: 128/62 (2019 08:51) (75/47 - 148/76)  BP(mean): 78 (2019 18:20) (56 - 81)  RR: 18 (2019 08:51) (18 - 25)  SpO2: 98% (2019 08:51) (96% - 99%)    PHYSICAL EXAM:  Gen: Obese. NAD, resting in bed  HEENT: Normocephalic, atraumatic  Neck: supple, no lymphadenopathy  CV: murmur at left second intercostal space   Lungs: decreased BS at bases  Abdomen: Soft, BS present  Ext: Warm, well perfused, LLE with large defect of calf, good granulation tissue, some purulence vs fibrinous tissue on dressings  Neuro: non focal, awake  Skin: no rash, no erythema    TESTS & MEASUREMENTS:                        7.7    8.37  )-----------( 488      ( 2019 11:23 )             24.4         133<L>  |  100  |  19  ----------------------------<  198<H>  4.9   |  22  |  0.8    Ca    7.8<L>      2019 11:23    TPro  5.4<L>  /  Alb  2.6<L>  /  TBili  0.4  /  DBili  x   /  AST  13  /  ALT  <5  /  AlkPhos  100      eGFR if Non African American: 67 mL/min/1.73M2 (19 @ 11:23)  eGFR if : 78 mL/min/1.73M2 (19 @ 11:23)    LIVER FUNCTIONS - ( 2019 00:05 )  Alb: 2.6 g/dL / Pro: 5.4 g/dL / ALK PHOS: 100 U/L / ALT: <5 U/L / AST: 13 U/L / GGT: x           Urinalysis Basic - ( 2019 04:20 )    Color: Yellow / Appearance: Cloudy / S.010 / pH: x  Gluc: x / Ketone: Negative  / Bili: Negative / Urobili: 0.2   Blood: x / Protein: Negative / Nitrite: Negative   Leuk Esterase: Moderate / RBC: x / WBC 5 /HPF   Sq Epi: x / Non Sq Epi: x / Bacteria: Occasional      Culture - Urine (collected 19 @ 04:20)  Source: .Urine Clean Catch (Midstream)  Final Report (19 @ 08:44):    No growth    Culture - Blood (collected 19 @ 00:05)  Source: .Blood Blood-Peripheral  Preliminary Report (19 @ 06:01):    No growth to date.    Culture - Blood (collected 19 @ 00:05)  Source: .Blood Blood-Peripheral  Preliminary Report (19 @ 06:01):    No growth to date.        Blood Gas Venous - Lactate: 1.0 mmoL/L (19 @ 00:05)      INFECTIOUS DISEASES TESTING      RADIOLOGY & ADDITIONAL TESTS:  I have personally reviewed the last Chest xray  CT  CT Abdomen and Pelvis w/ IV Cont:   EXAM:  CT ABDOMEN AND PELVIS IC            PROCEDURE DATE:  2019            INTERPRETATION:  CLINICAL STATEMENT: Hypotension and abdominal pain.    TECHNIQUE: Contiguous axial CT images were obtained from the lower chest   to the pubic symphysis following administration of 100cc Optiray 320   intravenous contrast.  Oral contrast was not administered.  Reformatted   images in the coronal and sagittal planes were acquired.    COMPARISON: CT abdomen and pelvis 2017.      FINDINGS:    LOWER CHEST: Small bilateral pleural effusions with overlying   subsegmental atelectasis.      HEPATOBILIARY: No suspicious parenchymal lesion or biliary ductal   dilatation. Cholelithiasis..    SPLEEN: Redemonstrated multiple irregular hypodensities within the   spleen, nonspecific.    PANCREAS: Unremarkable.    ADRENAL GLANDS: Unremarkable.    KIDNEYS: Symmetric renal enhancement. No hydroureteronephrosis. Mild   left-sided hydroureteronephrosis without evidence of obstructing calculus.    ABDOMINOPELVIC NODES: Interval enlargement of multiple calcified lymph   nodes within the retroperitoneal, periaortic, bilateral iliac, bilateral   pelvic sidewall and bilateral inguinal stations. Largest lymph node   within the periaortic space measures 3.6 x 1.6 cm (series 6, image 27),   largest within the pelvis is along the left external iliac chain   measuring 3.6 x 2.2 cm (series 3, image 63), and largest in the inguinal   stations is on the left measuring 2.5 x 2.1 cm (series 3, image 85).    PELVIC ORGANS: Calcified fibroid uterus. Saha catheter within the   urinary bladder.    PERITONEUM/MESENTERY/BOWEL: No bowel obstruction, pneumoperitoneum or   ascites. Colonic diverticulosis without evidence of   diverticulitis.Nonspecific presacral edema, new    BONES/SOFT TISSUES: Bones are diffusely osteopenic. Post total left hip   arthroplasty. Degenerative changes of the spine.    OTHER: Normal caliber aorta. Atherosclerotic disease.      IMPRESSION:     1.  Since 10/4/2017, interval enlargement in multistation calcified   lymphadenopathy as described above.    2.  New mild left-sided hydroureteronephrosis likely secondary to   encasement by enlarged retroperitoneal lymph nodes.    3.  Small/trace bilateral pleural effusions.    4.Cholelithiasis.        Dr. Yao Baptiste discussed preliminary findings with KENNEY KEBEDE MD   on 2019 5:30 AM with readback.              YAO BAPTISTE M.D., RESIDENT RADIOLOGIST  This document has been electronically signed.  ELLIOT LANDAU M.D., ATTENDING RADIOLOGIST  This document has been electronically signed. 2019  6:22AM             (19 @ 05:02)      CARDIOLOGY TESTING  12 Lead ECG:   Ventricular Rate 103 BPM    Atrial Rate 103 BPM    P-R Interval 224 ms    QRS Duration 90 ms    Q-T Interval 334 ms    QTC Calculation(Bezet) 437 ms    P Axis 44 degrees    R Axis 64 degrees    T Axis 17 degrees    Diagnosis Line Sinus tachycardia with 1st degree A-V block  Borderline EKG    Confirmed by Justice Melissa ([id number]) on 2019 10:48:59 AM (19 @ 09:25)  12 Lead ECG:   Ventricular Rate 68 BPM    Atrial Rate 68 BPM    P-R Interval 212 ms    QRS Duration 80 ms    Q-T Interval 438 ms    QTC Calculation(Bezet) 465 ms    P Axis 68 degrees    R Axis 45 degrees    T Axis 28 degrees    Diagnosis Line Sinus rhythm with 1st degree A-V block  Borderline EKG    Confirmed by Justice Melissa ([id number]) on 2019 7:03:16 AM (19 @ 00:19)      MEDICATIONS  aspirin enteric coated 81  collagenase Ointment 1  docusate sodium 100  enoxaparin Injectable 40  gabapentin 300  linezolid  IVPB   linezolid  IVPB 600  meropenem  IVPB 500  midodrine 10  norepinephrine Infusion 0.05  pantoprazole    Tablet 40  senna 2  simvastatin 20      ANTIBIOTICS:  linezolid  IVPB      linezolid  IVPB 600 milliGRAM(s) IV Intermittent every 12 hours  meropenem  IVPB 500 milliGRAM(s) IV Intermittent every 8 hours

## 2019-07-13 LAB
ANION GAP SERPL CALC-SCNC: 10 MMOL/L — SIGNIFICANT CHANGE UP (ref 7–14)
BASOPHILS # BLD AUTO: 0.03 K/UL — SIGNIFICANT CHANGE UP (ref 0–0.2)
BASOPHILS NFR BLD AUTO: 0.5 % — SIGNIFICANT CHANGE UP (ref 0–1)
BUN SERPL-MCNC: 19 MG/DL — SIGNIFICANT CHANGE UP (ref 10–20)
CALCIUM SERPL-MCNC: 8 MG/DL — LOW (ref 8.5–10.1)
CHLORIDE SERPL-SCNC: 101 MMOL/L — SIGNIFICANT CHANGE UP (ref 98–110)
CO2 SERPL-SCNC: 23 MMOL/L — SIGNIFICANT CHANGE UP (ref 17–32)
CREAT SERPL-MCNC: 0.8 MG/DL — SIGNIFICANT CHANGE UP (ref 0.7–1.5)
EOSINOPHIL # BLD AUTO: 0.22 K/UL — SIGNIFICANT CHANGE UP (ref 0–0.7)
EOSINOPHIL NFR BLD AUTO: 3.4 % — SIGNIFICANT CHANGE UP (ref 0–8)
GLUCOSE BLDC GLUCOMTR-MCNC: 104 MG/DL — HIGH (ref 70–99)
GLUCOSE BLDC GLUCOMTR-MCNC: 105 MG/DL — HIGH (ref 70–99)
GLUCOSE SERPL-MCNC: 122 MG/DL — HIGH (ref 70–99)
HCT VFR BLD CALC: 26 % — LOW (ref 37–47)
HGB BLD-MCNC: 8.1 G/DL — LOW (ref 12–16)
IMM GRANULOCYTES NFR BLD AUTO: 0.5 % — HIGH (ref 0.1–0.3)
LACTATE SERPL-SCNC: 2.5 MMOL/L — HIGH (ref 0.5–2.2)
LYMPHOCYTES # BLD AUTO: 0.16 K/UL — LOW (ref 1.2–3.4)
LYMPHOCYTES # BLD AUTO: 2.5 % — LOW (ref 20.5–51.1)
MAGNESIUM SERPL-MCNC: 2.2 MG/DL — SIGNIFICANT CHANGE UP (ref 1.8–2.4)
MCHC RBC-ENTMCNC: 27.2 PG — SIGNIFICANT CHANGE UP (ref 27–31)
MCHC RBC-ENTMCNC: 31.2 G/DL — LOW (ref 32–37)
MCV RBC AUTO: 87.2 FL — SIGNIFICANT CHANGE UP (ref 81–99)
MONOCYTES # BLD AUTO: 0.67 K/UL — HIGH (ref 0.1–0.6)
MONOCYTES NFR BLD AUTO: 10.3 % — HIGH (ref 1.7–9.3)
NEUTROPHILS # BLD AUTO: 5.38 K/UL — SIGNIFICANT CHANGE UP (ref 1.4–6.5)
NEUTROPHILS NFR BLD AUTO: 82.8 % — HIGH (ref 42.2–75.2)
NRBC # BLD: 0 /100 WBCS — SIGNIFICANT CHANGE UP (ref 0–0)
PLATELET # BLD AUTO: 497 K/UL — HIGH (ref 130–400)
POTASSIUM SERPL-MCNC: 5 MMOL/L — SIGNIFICANT CHANGE UP (ref 3.5–5)
POTASSIUM SERPL-SCNC: 5 MMOL/L — SIGNIFICANT CHANGE UP (ref 3.5–5)
RBC # BLD: 2.98 M/UL — LOW (ref 4.2–5.4)
RBC # FLD: 16.1 % — HIGH (ref 11.5–14.5)
SODIUM SERPL-SCNC: 134 MMOL/L — LOW (ref 135–146)
URATE SERPL-MCNC: 4.8 MG/DL — SIGNIFICANT CHANGE UP (ref 2.5–7)
WBC # BLD: 6.49 K/UL — SIGNIFICANT CHANGE UP (ref 4.8–10.8)
WBC # FLD AUTO: 6.49 K/UL — SIGNIFICANT CHANGE UP (ref 4.8–10.8)

## 2019-07-13 PROCEDURE — 99233 SBSQ HOSP IP/OBS HIGH 50: CPT

## 2019-07-13 PROCEDURE — 99222 1ST HOSP IP/OBS MODERATE 55: CPT

## 2019-07-13 PROCEDURE — 71045 X-RAY EXAM CHEST 1 VIEW: CPT | Mod: 26

## 2019-07-13 RX ORDER — SODIUM CHLORIDE 9 MG/ML
1000 INJECTION INTRAMUSCULAR; INTRAVENOUS; SUBCUTANEOUS
Refills: 0 | Status: DISCONTINUED | OUTPATIENT
Start: 2019-07-13 | End: 2019-07-13

## 2019-07-13 RX ORDER — SODIUM CHLORIDE 9 MG/ML
250 INJECTION INTRAMUSCULAR; INTRAVENOUS; SUBCUTANEOUS ONCE
Refills: 0 | Status: COMPLETED | OUTPATIENT
Start: 2019-07-13 | End: 2019-07-13

## 2019-07-13 RX ADMIN — Medication 81 MILLIGRAM(S): at 11:10

## 2019-07-13 RX ADMIN — PANTOPRAZOLE SODIUM 40 MILLIGRAM(S): 20 TABLET, DELAYED RELEASE ORAL at 05:11

## 2019-07-13 RX ADMIN — MIDODRINE HYDROCHLORIDE 5 MILLIGRAM(S): 2.5 TABLET ORAL at 05:10

## 2019-07-13 RX ADMIN — Medication 1 APPLICATION(S): at 14:07

## 2019-07-13 RX ADMIN — MEROPENEM 100 MILLIGRAM(S): 1 INJECTION INTRAVENOUS at 21:53

## 2019-07-13 RX ADMIN — MIDODRINE HYDROCHLORIDE 5 MILLIGRAM(S): 2.5 TABLET ORAL at 21:53

## 2019-07-13 RX ADMIN — Medication 100 MILLIGRAM(S): at 11:10

## 2019-07-13 RX ADMIN — SODIUM CHLORIDE 250 MILLILITER(S): 9 INJECTION INTRAMUSCULAR; INTRAVENOUS; SUBCUTANEOUS at 17:48

## 2019-07-13 RX ADMIN — MEROPENEM 100 MILLIGRAM(S): 1 INJECTION INTRAVENOUS at 13:12

## 2019-07-13 RX ADMIN — Medication 200 MILLIGRAM(S): at 13:11

## 2019-07-13 RX ADMIN — ENOXAPARIN SODIUM 40 MILLIGRAM(S): 100 INJECTION SUBCUTANEOUS at 11:11

## 2019-07-13 RX ADMIN — MEROPENEM 100 MILLIGRAM(S): 1 INJECTION INTRAVENOUS at 05:09

## 2019-07-13 RX ADMIN — GABAPENTIN 300 MILLIGRAM(S): 400 CAPSULE ORAL at 21:53

## 2019-07-13 RX ADMIN — MIDODRINE HYDROCHLORIDE 5 MILLIGRAM(S): 2.5 TABLET ORAL at 13:11

## 2019-07-13 RX ADMIN — SIMVASTATIN 20 MILLIGRAM(S): 20 TABLET, FILM COATED ORAL at 21:53

## 2019-07-13 NOTE — CONSULT NOTE ADULT - SUBJECTIVE AND OBJECTIVE BOX
Patient is a 85y old  Female who presents with a chief complaint of hypotnsion (13 Jul 2019 08:46)      HPI:  84 yo F wth PVD, HTN, chronic stasis ulcer, multiple debridement by Burn p/w 1 day hx of hypotension from NH. Hx was obtained from aide at bedside. Pt has been lethargic for the past 3 days w/ reduced po intake. Pt had a tmax of 103F at NH. They checked bP today and was in low 70s so was sent to ED. Pt also was complaining of vague abdominal pain but no dysuria or frequency or urgency. no productive cough, wegiht loss or hemoptysis or red blood per rectum (11 Jul 2019 02:50)         PAST MEDICAL & SURGICAL HISTORY:  PVD (peripheral vascular disease)  Skin ulcer of left ankle, limited to breakdown of skin  Leg swelling  HTN (hypertension)  History of hip replacement    FAMILY HISTORY:    Allergies    vancomycin (Flushing)    Intolerances    HOME MEDICATIONS:  citalopram 10 mg oral tablet: 1 tab(s) orally once a day (11 Jul 2019 03:09)  docusate sodium 100 mg oral capsule: 1 cap(s) orally once a day (11 Jul 2019 03:09)  ferrous sulfate 325 mg (65 mg elemental iron) oral tablet: 1 tab(s) orally 3 times a day (11 Jul 2019 03:09)  gabapentin 300 mg oral capsule: 1 cap(s) orally once a day (at bedtime) (11 Jul 2019 03:09)  senna oral tablet: 2 tab(s) orally once a day (at bedtime) (11 Jul 2019 03:09)  valsartan 40 mg oral tablet: 40 milligram(s) orally once a day (11 Jul 2019 03:09)      Vital Signs Last 24 Hrs  T(C): 36.3 (13 Jul 2019 06:00), Max: 38.1 (12 Jul 2019 17:22)  T(F): 97.3 (13 Jul 2019 06:00), Max: 100.5 (12 Jul 2019 17:22)  HR: 74 (13 Jul 2019 06:00) (67 - 98)  BP: 135/62 (13 Jul 2019 06:00) (97/50 - 135/62)  BP(mean): --  RR: 20 (13 Jul 2019 06:00) (18 - 20)  SpO2: 96% (12 Jul 2019 18:32) (96% - 98%)    PHYSICAL EXAM  General: adult in NAD  HEENT: clear oropharynx, anicteric sclera, pale conjunctiva  Neck: supple - no palpable LN   CV: normal S1/S2   Lungs: positive air movement b/l  Abdomen: soft non-tender non-distended  Ext: bilateral swelling of LE with chronic venous stasis  Neuro: alert and oriented X 4, no focal deficits    MEDICATIONS  (STANDING):  aspirin enteric coated 81 milliGRAM(s) Oral daily  collagenase Ointment 1 Application(s) Topical daily  docusate sodium 100 milliGRAM(s) Oral daily  enoxaparin Injectable 40 milliGRAM(s) SubCutaneous daily  gabapentin 300 milliGRAM(s) Oral at bedtime  meropenem  IVPB 500 milliGRAM(s) IV Intermittent every 8 hours  midodrine 5 milliGRAM(s) Oral three times a day  pantoprazole    Tablet 40 milliGRAM(s) Oral before breakfast  senna 2 Tablet(s) Oral at bedtime  simvastatin 20 milliGRAM(s) Oral at bedtime  sodium chloride 0.9% Bolus 250 milliLiter(s) IV Bolus once    MEDICATIONS  (PRN):  guaiFENesin   Syrup  (Sugar-Free) 200 milliGRAM(s) Oral every 6 hours PRN Cough      LABS:                          8.1    6.49  )-----------( 497      ( 13 Jul 2019 09:19 )             26.0         Mean Cell Volume : 87.2 fL  Mean Cell Hemoglobin : 27.2 pg  Mean Cell Hemoglobin Concentration : 31.2 g/dL  Auto Neutrophil # : 5.38 K/uL  Auto Lymphocyte # : 0.16 K/uL  Auto Monocyte # : 0.67 K/uL  Auto Eosinophil # : 0.22 K/uL  Auto Basophil # : 0.03 K/uL  Auto Neutrophil % : 82.8 %  Auto Lymphocyte % : 2.5 %  Auto Monocyte % : 10.3 %  Auto Eosinophil % : 3.4 %  Auto Basophil % : 0.5 %      Serial CBC's  07-13 @ 09:19  Hct-26.0 / Hgb-8.1 / Plat-497 / RBC-2.98 / WBC-6.49  Serial CBC's  07-12 @ 20:32  Hct-25.7 / Hgb-8.0 / Plat-512 / RBC-2.92 / WBC-7.04  Serial CBC's  07-11 @ 11:23  Hct-24.4 / Hgb-7.7 / Plat-488 / RBC-2.78 / WBC-8.37  Serial CBC's  07-11 @ 06:05  Hct--- / Hgb--- / Plat--- / RBC-3.43 / WBC---  Serial CBC's  07-11 @ 00:05  Hct-25.3 / Hgb-8.1 / Plat-487 / RBC-2.89 / WBC-5.76      07-13    134<L>  |  101  |  19  ----------------------------<  122<H>  5.0   |  23  |  0.8    Ca    8.0<L>      13 Jul 2019 09:19  Mg     2.2     07-13            Iron - Total Binding Capacity.: 185 ug/dL (07-11 @ 06:05)  Ferritin, Serum: 216 ng/mL (07-11 @ 06:05)  Vitamin B12, Serum: 534 pg/mL (07-11 @ 06:05)  Folate, Serum: 10.5 ng/mL (07-11 @ 06:05)  Reticulocyte Percent: 1.3 % (07-11 @ 06:05)                    Culture - Blood (collected 11 Jul 2019 06:05)  Source: .Blood None  Preliminary Report (12 Jul 2019 14:01):    No growth to date.    Culture - Urine (collected 11 Jul 2019 04:20)  Source: .Urine Clean Catch (Midstream)  Final Report (12 Jul 2019 08:44):    No growth    Culture - Blood (collected 11 Jul 2019 00:05)  Source: .Blood Blood-Peripheral  Preliminary Report (12 Jul 2019 06:01):    No growth to date.    Culture - Blood (collected 11 Jul 2019 00:05)  Source: .Blood Blood-Peripheral  Preliminary Report (12 Jul 2019 06:01):    No growth to date.    RADIOLOGY & ADDITIONAL STUDIES:  < from: Xray Foot AP + Lateral, Left (07.11.19 @ 08:40) >  Impression:    Diffuse osteopenia without evidence of acute osseous abnormality.    No radiographic evidence of soft tissue ulcer or osteomyelitis.        < end of copied text >    < from: [facility] Duplex Lower Ext Vein Scan, Bilat (07.11.19 @ 08:23) >    Impression:    No evidence of deep venous thrombosis or superficial thrombophlebitis in   bilateral lower extremities.    ICD-10: M 79.89    < end of copied text >    < from: VA Duplex Lower Extrem Arterial, Bilat (07.11.19 @ 08:21) >  Impression:    Diffuse mild to moderate arterial atherosclerotic disease bilateral lower   extremities.    No evidence of high-grade stenosis or arterial occlusion in bilateral   lower extremities.    Correlate clinically.      < end of copied text >    < from: CT Abdomen and Pelvis w/ IV Cont (07.11.19 @ 05:02) >  1.  Since 10/4/2017, interval enlargement in multistation calcified   lymphadenopathy as described above.    2.  New mild left-sided hydroureteronephrosis likely secondary to   encasement by enlarged retroperitoneal lymph nodes.    3.  Small/trace bilateral pleural effusions.    4.Cholelithiasis.        < end of copied text >

## 2019-07-13 NOTE — PROGRESS NOTE ADULT - ASSESSMENT
ASSESSMENT  86 yo F PVD, HTN, chronic stasis ulcer, multiple debridement by Burn, last wcx with pseudomonas p/w 1 day hx of hypotension from NH.  Pt has been lethargic for the past 3 days w/ reduced po intake. Pt had a tmax of 103F at NH. Was on abx for ESBL in urine 7/8   Pt also was complaining of vague abdominal pain but no dysuria or frequency or urgency.   Tmax last 24 hours is 103, lactate has been within normal range.  started on broad spectrum ABx     # Septic shock unclear etiology. 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

## 2019-07-13 NOTE — CHART NOTE - NSCHARTNOTEFT_GEN_A_CORE
Covering Resident A/P    # Septic Shock (Resolved) 2/2 UTI / Ucx + E.coli  - Ucx are in HIE from 7/8 (done at NH)  - C/w Meropenem, switch to Bactrim on DC for total 7 days ( End: 7/17/19 )    # Pt has a hx of Kaposi Sarcoma  - Oncologist is Dr. Jesús Friedman ([phone]). Office closed.  - The Lymphadenopathy likely related this per Oncology team here.    # Urinary Retention - TOV today    # Hyperkalemia - Kayexalate, repeat in the afternoon. Covering Resident A/P    # Septic Shock (Resolved) 2/2 UTI / Ucx + E.coli  - Ucx are in HIE from 7/8 (done at NH)  - C/w Meropenem, switch to Bactrim on DC for total 7 days ( End: 7/17/19 )    # Pt has a hx of Kaposi Sarcoma  - Oncologist is Dr. Jesús Friedman ([phone]). Office closed.  - The Lymphadenopathy likely related this per Oncology team here.    # Urinary Retention - TOV today Covering Resident A/P    # Septic Shock (Resolved) 2/2 UTI / Ucx + E.coli  - Ucx are in HIE from 7/8 (done at NH)  - C/w Meropenem, switch to Bactrim on DC for total 7 days ( End: 7/17/19 )    # Pt has a hx of Kaposi Sarcoma currently on Chemotherapy   - Oncologist is Dr. Jesús Friedman ([phone]). Office closed.  - The Lymphadenopathy likely related this per Oncology team here.    # Urinary Retention - TOV today Covering Resident A/P    # Septic Shock (Resolved) 2/2 UTI / Ucx + E.coli  - Ucx are in HIE from 7/8 (done at NH)  - C/w Meropenem, switch to Bactrim on DC for total 7 days ( End: 7/17/19 )    # Elevated Lactate - IVF bolus, repeat level    # Pt has a hx of Kaposi Sarcoma currently on Chemotherapy   - Oncologist is Dr. Jesús Friedman ([phone]). Office closed.  - The Lymphadenopathy likely related this per Oncology team here.    # Urinary Retention - TOV today

## 2019-07-13 NOTE — PROGRESS NOTE ADULT - SUBJECTIVE AND OBJECTIVE BOX
KALI ANDERS  85y, Female  Allergy: vancomycin (Flushing)    Hospital Day: 2d    Patient seen and examined earlier today.     PMH/PSH:  PAST MEDICAL & SURGICAL HISTORY:  PVD (peripheral vascular disease)  Skin ulcer of left ankle, limited to breakdown of skin  Leg swelling  HTN (hypertension)  History of hip replacement      LAST 24-Hr EVENTS:    VITALS:  T(F): 97.3 (07-13-19 @ 06:00), Max: 101.4 (07-12-19 @ 13:02)  HR: 74 (07-13-19 @ 06:00)  BP: 135/62 (07-13-19 @ 06:00) (97/50 - 150/72)  RR: 20 (07-13-19 @ 06:00)  SpO2: 96% (07-12-19 @ 18:32)    TESTS & MEASUREMENTS:  Weight (Kg):       07-11-19 @ 07:01  -  07-12-19 @ 07:00  --------------------------------------------------------  IN: 300 mL / OUT: 920 mL / NET: -620 mL    07-12-19 @ 07:01  -  07-13-19 @ 07:00  --------------------------------------------------------  IN: 0 mL / OUT: 450 mL / NET: -450 mL                            8.0    7.04  )-----------( 512      ( 12 Jul 2019 20:32 )             25.7       07-12    134<L>  |  100  |  20  ----------------------------<  142<H>  5.1<H>   |  23  |  0.8    Ca    7.8<L>      12 Jul 2019 20:32  Mg     2.1     07-12        CARDIAC MARKERS ( 11 Jul 2019 11:23 )  x     / <0.01 ng/mL / x     / x     / <1.0 ng/mL        Culture - Blood (collected 07-11-19 @ 06:05)  Source: .Blood None  Preliminary Report (07-12-19 @ 14:01):    No growth to date.    Culture - Urine (collected 07-11-19 @ 04:20)  Source: .Urine Clean Catch (Midstream)  Final Report (07-12-19 @ 08:44):    No growth    Culture - Blood (collected 07-11-19 @ 00:05)  Source: .Blood Blood-Peripheral  Preliminary Report (07-12-19 @ 06:01):    No growth to date.    Culture - Blood (collected 07-11-19 @ 00:05)  Source: .Blood Blood-Peripheral  Preliminary Report (07-12-19 @ 06:01):    No growth to date.    RADIOLOGY & ADDITIONAL TESTS:  < from: Xray Foot AP + Lateral, Left (07.11.19 @ 08:40) >  Diffuse osteopenia without evidence of acute osseous abnormality.    No radiographic evidence of soft tissue ulcer or osteomyelitis.    < from: VA Duplex Lower Ext Vein Scan, Bilat (07.11.19 @ 08:23) >  No evidence of deep venous thrombosis or superficial thrombophlebitis in   bilateral lower extremities.    < from: Transthoracic Echocardiogram (03.08.19 @ 08:47) >   1. LV Ejection Fraction by Zavala's Method with a biplane EF of 58 %.   2. Normal left ventricular size and wall thicknesses, with normal   systolic function.   3. Spectral Doppler shows impaired relaxation pattern of left   ventricular myocardial filling (Grade I diastolic dysfunction).   4. Moderate mitral annular calcification.   5. Structurally normal mitral valve, with normal leaflet excursion.   6. Mild tricuspid regurgitation.   7. 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 MildAS by   doppler.   8. Pulmonic valve regurgitation.          < from: Xray Chest 1 View-PORTABLE IMMEDIATE (07.11.19 @ 01:36) >  Left basilar opacity.    Likely mediastinal lymphadenopathy.      RECENT DIAGNOSTIC ORDERS:  Basic Metabolic Panel: Repeat From: 12-Jul-2019 11:48 To: 15-Jul-2019 04:30, Every 1 day(s) (07-12-19 @ 11:48)  Basic Metabolic Panel: AM Sched. Collection: 13-Jul-2019 04:30 (07-12-19 @ 11:48)  Complete Blood Count + Automated Diff: Repeat From: 12-Jul-2019 11:48 To: 15-Jul-2019 04:30, Every 1 day(s) (07-12-19 @ 11:48)  Complete Blood Count + Automated Diff: AM Sched. Collection: 13-Jul-2019 04:30 (07-12-19 @ 11:48)  Magnesium, Serum: Repeat From: 12-Jul-2019 11:48 To: 15-Jul-2019 04:30, Every 1 day(s) (07-12-19 @ 11:48)  Magnesium, Serum: AM Sched. Collection: 13-Jul-2019 04:30 (07-12-19 @ 11:48)  Xray Chest 1 View- PORTABLE-Routine: AM   Indication: Pneumonia  Transport: Portable  Exam Completed (07-12-19 @ 16:06)  Uric Acid, Serum: AM Sched. Collection: 13-Jul-2019 04:30 (07-12-19 @ 16:14)  Transthoracic Echocardiogram:   Transport: Stretcher-Crib  Monitor: w/o Monitor (07-12-19 @ 17:23)  Lactate, Blood: AM Sched. Collection: 13-Jul-2019 04:30 (07-12-19 @ 18:26)      MEDICATIONS:  MEDICATIONS  (STANDING):  aspirin enteric coated 81 milliGRAM(s) Oral daily  collagenase Ointment 1 Application(s) Topical daily  docusate sodium 100 milliGRAM(s) Oral daily  enoxaparin Injectable 40 milliGRAM(s) SubCutaneous daily  gabapentin 300 milliGRAM(s) Oral at bedtime  meropenem  IVPB 500 milliGRAM(s) IV Intermittent every 8 hours  midodrine 5 milliGRAM(s) Oral three times a day  pantoprazole    Tablet 40 milliGRAM(s) Oral before breakfast  senna 2 Tablet(s) Oral at bedtime  simvastatin 20 milliGRAM(s) Oral at bedtime    MEDICATIONS  (PRN):      HOME MEDICATIONS:  citalopram 10 mg oral tablet (07-11)  docusate sodium 100 mg oral capsule (07-11)  ferrous sulfate 325 mg (65 mg elemental iron) oral tablet (07-11)  gabapentin 300 mg oral capsule (07-11)  senna oral tablet (07-11)  valsartan 40 mg oral tablet (07-11)      PHYSICAL EXAM:  GENERAL: A&O x3,  in NAD/P,   NECK: No Swelling  CHEST/LUNG: Good air entry, No wheezing  HEART: RRR, No murmurs  ABDOMEN: Soft, Bowel sounds present  EXTREMITIES:  No clubbing, KALI ANDERS  85y, Female  Allergy: vancomycin (Flushing)    Hospital Day: 2d    Patient seen and examined earlier today.     LAST 24-Hr EVENTS:  Feeling better  NO acute events reported  Remains with SINGH    VITALS:  T(F): 97.3 (07-13-19 @ 06:00), Max: 101.4 (07-12-19 @ 13:02)  HR: 74 (07-13-19 @ 06:00)  BP: 135/62 (07-13-19 @ 06:00) (97/50 - 150/72)  RR: 20 (07-13-19 @ 06:00)  SpO2: 96% (07-12-19 @ 18:32)    TESTS & MEASUREMENTS:    07-11-19 @ 07:01  -  07-12-19 @ 07:00  --------------------------------------------------------  IN: 300 mL / OUT: 920 mL / NET: -620 mL    07-12-19 @ 07:01  -  07-13-19 @ 07:00  --------------------------------------------------------  IN: 0 mL / OUT: 450 mL / NET: -450 mL                            8.0    7.04  )-----------( 512      ( 12 Jul 2019 20:32 )             25.7       07-12    134<L>  |  100  |  20  ----------------------------<  142<H>  5.1<H>   |  23  |  0.8    Ca    7.8<L>      12 Jul 2019 20:32  Mg     2.1     07-12        CARDIAC MARKERS ( 11 Jul 2019 11:23 )  x     / <0.01 ng/mL / x     / x     / <1.0 ng/mL        Culture - Blood (collected 07-11-19 @ 06:05)  Source: .Blood None  Preliminary Report (07-12-19 @ 14:01):    No growth to date.    Culture - Urine (collected 07-11-19 @ 04:20)  Source: .Urine Clean Catch (Midstream)  Final Report (07-12-19 @ 08:44):    No growth    Culture - Blood (collected 07-11-19 @ 00:05)  Source: .Blood Blood-Peripheral  Preliminary Report (07-12-19 @ 06:01):    No growth to date.    Culture - Blood (collected 07-11-19 @ 00:05)  Source: .Blood Blood-Peripheral  Preliminary Report (07-12-19 @ 06:01):    No growth to date.    RADIOLOGY & ADDITIONAL TESTS:  < from: Xray Foot AP + Lateral, Left (07.11.19 @ 08:40) >  Diffuse osteopenia without evidence of acute osseous abnormality.    No radiographic evidence of soft tissue ulcer or osteomyelitis.    < from: VA Duplex Lower Ext Vein Scan, Bilat (07.11.19 @ 08:23) >  No evidence of deep venous thrombosis or superficial thrombophlebitis in   bilateral lower extremities.    < from: Transthoracic Echocardiogram (03.08.19 @ 08:47) >   1. LV Ejection Fraction by Zavala's Method with a biplane EF of 58 %.   2. Normal left ventricular size and wall thicknesses, with normal   systolic function.   3. Spectral Doppler shows impaired relaxation pattern of left   ventricular myocardial filling (Grade I diastolic dysfunction).   4. Moderate mitral annular calcification.   5. Structurally normal mitral valve, with normal leaflet excursion.   6. Mild tricuspid regurgitation.   7. 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 MildAS by   doppler.   8. Pulmonic valve regurgitation.        < from: Xray Chest 1 View-PORTABLE IMMEDIATE (07.11.19 @ 01:36) >  Left basilar opacity.    Likely mediastinal lymphadenopathy.      MEDICATIONS:  MEDICATIONS  (STANDING):  aspirin enteric coated 81 milliGRAM(s) Oral daily  collagenase Ointment 1 Application(s) Topical daily  docusate sodium 100 milliGRAM(s) Oral daily  enoxaparin Injectable 40 milliGRAM(s) SubCutaneous daily  gabapentin 300 milliGRAM(s) Oral at bedtime  meropenem  IVPB 500 milliGRAM(s) IV Intermittent every 8 hours  midodrine 5 milliGRAM(s) Oral three times a day  pantoprazole    Tablet 40 milliGRAM(s) Oral before breakfast  senna 2 Tablet(s) Oral at bedtime  simvastatin 20 milliGRAM(s) Oral at bedtime    MEDICATIONS  (PRN):      HOME MEDICATIONS:  citalopram 10 mg oral tablet (07-11)  docusate sodium 100 mg oral capsule (07-11)  ferrous sulfate 325 mg (65 mg elemental iron) oral tablet (07-11)  gabapentin 300 mg oral capsule (07-11)  senna oral tablet (07-11)  valsartan 40 mg oral tablet (07-11)      PHYSICAL EXAM:  GENERAL: A&O x3,  in NAD/P,   NECK: No Swelling  CHEST/LUNG: Good air entry, No wheezing  HEART: RRR, No murmurs  ABDOMEN: Soft, Bowel sounds present  EXTREMITIES:  No clubbing, KALI ANDERS  85y, Female  Allergy: vancomycin (Flushing)    Hospital Day: 2d    Patient seen and examined earlier today.     LAST 24-Hr EVENTS:  Feeling better  NO acute events reported  Remains with SINGH    VITALS:  T(F): 97.3 (07-13-19 @ 06:00), Max: 101.4 (07-12-19 @ 13:02)  HR: 74 (07-13-19 @ 06:00)  BP: 135/62 (07-13-19 @ 06:00) (97/50 - 150/72)  RR: 20 (07-13-19 @ 06:00)  SpO2: 96% (07-12-19 @ 18:32)    TESTS & MEASUREMENTS:    07-11-19 @ 07:01  -  07-12-19 @ 07:00  --------------------------------------------------------  IN: 300 mL / OUT: 920 mL / NET: -620 mL    07-12-19 @ 07:01  -  07-13-19 @ 07:00  --------------------------------------------------------  IN: 0 mL / OUT: 450 mL / NET: -450 mL                            8.0    7.04  )-----------( 512      ( 12 Jul 2019 20:32 )             25.7       07-12    134<L>  |  100  |  20  ----------------------------<  142<H>  5.1<H>   |  23  |  0.8    Ca    7.8<L>      12 Jul 2019 20:32  Mg     2.1     07-12        CARDIAC MARKERS ( 11 Jul 2019 11:23 )  x     / <0.01 ng/mL / x     / x     / <1.0 ng/mL        Culture - Blood (collected 07-11-19 @ 06:05)  Source: .Blood None  Preliminary Report (07-12-19 @ 14:01):    No growth to date.    Culture - Urine (collected 07-11-19 @ 04:20)  Source: .Urine Clean Catch (Midstream)  Final Report (07-12-19 @ 08:44):    No growth    Culture - Blood (collected 07-11-19 @ 00:05)  Source: .Blood Blood-Peripheral  Preliminary Report (07-12-19 @ 06:01):    No growth to date.    Culture - Blood (collected 07-11-19 @ 00:05)  Source: .Blood Blood-Peripheral  Preliminary Report (07-12-19 @ 06:01):    No growth to date.    RADIOLOGY & ADDITIONAL TESTS:  < from: Xray Foot AP + Lateral, Left (07.11.19 @ 08:40) >  Diffuse osteopenia without evidence of acute osseous abnormality.    No radiographic evidence of soft tissue ulcer or osteomyelitis.    < from: VA Duplex Lower Ext Vein Scan, Bilat (07.11.19 @ 08:23) >  No evidence of deep venous thrombosis or superficial thrombophlebitis in   bilateral lower extremities.    < from: Transthoracic Echocardiogram (03.08.19 @ 08:47) >   1. LV Ejection Fraction by Zavala's Method with a biplane EF of 58 %.   2. Normal left ventricular size and wall thicknesses, with normal   systolic function.   3. Spectral Doppler shows impaired relaxation pattern of left   ventricular myocardial filling (Grade I diastolic dysfunction).   4. Moderate mitral annular calcification.   5. Structurally normal mitral valve, with normal leaflet excursion.   6. Mild tricuspid regurgitation.   7. 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 MildAS by   doppler.   8. Pulmonic valve regurgitation.        < from: Xray Chest 1 View-PORTABLE IMMEDIATE (07.11.19 @ 01:36) >  Left basilar opacity.    Likely mediastinal lymphadenopathy.      MEDICATIONS:  MEDICATIONS  (STANDING):  aspirin enteric coated 81 milliGRAM(s) Oral daily  collagenase Ointment 1 Application(s) Topical daily  docusate sodium 100 milliGRAM(s) Oral daily  enoxaparin Injectable 40 milliGRAM(s) SubCutaneous daily  gabapentin 300 milliGRAM(s) Oral at bedtime  meropenem  IVPB 500 milliGRAM(s) IV Intermittent every 8 hours  midodrine 5 milliGRAM(s) Oral three times a day  pantoprazole    Tablet 40 milliGRAM(s) Oral before breakfast  senna 2 Tablet(s) Oral at bedtime  simvastatin 20 milliGRAM(s) Oral at bedtime    MEDICATIONS  (PRN):      HOME MEDICATIONS:  citalopram 10 mg oral tablet (07-11)  docusate sodium 100 mg oral capsule (07-11)  ferrous sulfate 325 mg (65 mg elemental iron) oral tablet (07-11)  gabapentin 300 mg oral capsule (07-11)  senna oral tablet (07-11)  valsartan 40 mg oral tablet (07-11)      PHYSICAL EXAM:  GENERAL: A&O x3,  in NAD/P, laying comfortably in bed  NECK: No Swelling  CHEST/LUNG: Good air entry, No wheezing  HEART: RRR, No murmurs  ABDOMEN: Soft, Bowel sounds present, ++ Singh  EXTREMITIES:  No clubbing, chronic venous ulcer (calf), no discharge

## 2019-07-13 NOTE — PROGRESS NOTE ADULT - SUBJECTIVE AND OBJECTIVE BOX
KALI ANDERS  85y, Female  Allergy: vancomycin (Flushing)      CHIEF COMPLAINT: hypotension (12 Jul 2019 16:43)      INTERVAL EVENTS/HPI  - No acute events overnight  - T(F): , Max: 101.4 (07-12-19 @ 13:02)  - Denies any worsening symptoms  - Tolerating medication  - WBC Count: 7.04 K/uL (07-12-19 @ 20:32)      ROS  General: Denies rigors, nightsweats  HEENT: Denies headache, rhinorrhea, sore throat, eye pain  CV: Denies CP, palpitations  PULM: Denies SOB, wheezing  GI: Denies abdominal pain, hematochezia/melena  : +dysuria  MSK: Denies arthralgias, myalgias  SKIN: Denies rash, lesions  NEURO: Denies paresthesias, weakness  PSYCH: Denies depression, anxiety    VITALS:  T(F): 97.3, Max: 101.4 (07-12-19 @ 13:02)  HR: 74  BP: 135/62  RR: 20Vital Signs Last 24 Hrs  T(C): 36.3 (13 Jul 2019 06:00), Max: 38.6 (12 Jul 2019 13:02)  T(F): 97.3 (13 Jul 2019 06:00), Max: 101.4 (12 Jul 2019 13:02)  HR: 74 (13 Jul 2019 06:00) (67 - 106)  BP: 135/62 (13 Jul 2019 06:00) (97/50 - 150/72)  BP(mean): --  RR: 20 (13 Jul 2019 06:00) (18 - 20)  SpO2: 96% (12 Jul 2019 18:32) (96% - 98%)    PHYSICAL EXAM:  Gen: Obese. NAD, resting in bed  HEENT: Normocephalic, atraumatic  Neck: supple, no lymphadenopathy  CV: murmur at left second intercostal space   Lungs: decreased BS at bases  Abdomen: Soft, BS present  Ext: Warm, well perfused, LLE with large defect of calf, good granulation tissue, some purulence vs fibrinous tissue on dressings  Neuro: non focal, awake  Skin: no rash, no erythema        FH: Non-contributory  Social Hx: Non-contributory    TESTS & MEASUREMENTS:                        8.0    7.04  )-----------( 512      ( 12 Jul 2019 20:32 )             25.7     07-12    134<L>  |  100  |  20  ----------------------------<  142<H>  5.1<H>   |  23  |  0.8    Ca    7.8<L>      12 Jul 2019 20:32  Mg     2.1     07-12      eGFR if Non African American: 67 mL/min/1.73M2 (07-12-19 @ 20:32)  eGFR if : 78 mL/min/1.73M2 (07-12-19 @ 20:32)          Culture - Blood (collected 07-11-19 @ 06:05)  Source: .Blood None  Preliminary Report (07-12-19 @ 14:01):    No growth to date.    Culture - Urine (collected 07-11-19 @ 04:20)  Source: .Urine Clean Catch (Midstream)  Final Report (07-12-19 @ 08:44):    No growth    Culture - Blood (collected 07-11-19 @ 00:05)  Source: .Blood Blood-Peripheral  Preliminary Report (07-12-19 @ 06:01):    No growth to date.    Culture - Blood (collected 07-11-19 @ 00:05)  Source: .Blood Blood-Peripheral  Preliminary Report (07-12-19 @ 06:01):    No growth to date.        Lactate, Blood: 2.4 mmol/L (07-12-19 @ 20:32)  Blood Gas Venous - Lactate: 1.0 mmoL/L (07-11-19 @ 00:05)      INFECTIOUS DISEASES TESTING      RADIOLOGY & ADDITIONAL TESTS:  I have personally reviewed the last Chest xray  CXR      CT  CT Abdomen and Pelvis w/ IV Cont:   EXAM:  CT ABDOMEN AND PELVIS IC            PROCEDURE DATE:  07/11/2019            INTERPRETATION:  CLINICAL STATEMENT: Hypotension and abdominal pain.    TECHNIQUE: Contiguous axial CT images were obtained from the lower chest   to the pubic symphysis following administration of 100cc Optiray 320   intravenous contrast.  Oral contrast was not administered.  Reformatted   images in the coronal and sagittal planes were acquired.    COMPARISON: CT abdomen and pelvis October 4, 2017.      FINDINGS:    LOWER CHEST: Small bilateral pleural effusions with overlying   subsegmental atelectasis.      HEPATOBILIARY: No suspicious parenchymal lesion or biliary ductal   dilatation. Cholelithiasis..    SPLEEN: Redemonstrated multiple irregular hypodensities within the   spleen, nonspecific.    PANCREAS: Unremarkable.    ADRENAL GLANDS: Unremarkable.    KIDNEYS: Symmetric renal enhancement. No hydroureteronephrosis. Mild   left-sided hydroureteronephrosis without evidence of obstructing calculus.    ABDOMINOPELVIC NODES: Interval enlargement of multiple calcified lymph   nodes within the retroperitoneal, periaortic, bilateral iliac, bilateral   pelvic sidewall and bilateral inguinal stations. Largest lymph node   within the periaortic space measures 3.6 x 1.6 cm (series 6, image 27),   largest within the pelvis is along the left external iliac chain   measuring 3.6 x 2.2 cm (series 3, image 63), and largest in the inguinal   stations is on the left measuring 2.5 x 2.1 cm (series 3, image 85).    PELVIC ORGANS: Calcified fibroid uterus. Saha catheter within the   urinary bladder.    PERITONEUM/MESENTERY/BOWEL: No bowel obstruction, pneumoperitoneum or   ascites. Colonic diverticulosis without evidence of   diverticulitis.Nonspecific presacral edema, new    BONES/SOFT TISSUES: Bones are diffusely osteopenic. Post total left hip   arthroplasty. Degenerative changes of the spine.    OTHER: Normal caliber aorta. Atherosclerotic disease.      IMPRESSION:     1.  Since 10/4/2017, interval enlargement in multistation calcified   lymphadenopathy as described above.    2.  New mild left-sided hydroureteronephrosis likely secondary to   encasement by enlarged retroperitoneal lymph nodes.    3.  Small/trace bilateral pleural effusions.    4.Cholelithiasis.        Dr. Yao Baptiste discussed preliminary findings with KENNEY KEBEDE MD   on 7/11/2019 5:30 AM with readback.              YAO BAPTISTE M.D., RESIDENT RADIOLOGIST  This document has been electronically signed.  ELLIOT LANDAU M.D., ATTENDING RADIOLOGIST  This document has been electronically signed. Jul 11 2019  6:22AM             (07-11-19 @ 05:02)      CARDIOLOGY TESTING  12 Lead ECG:   Ventricular Rate 103 BPM    Atrial Rate 103 BPM    P-R Interval 224 ms    QRS Duration 90 ms    Q-T Interval 334 ms    QTC Calculation(Bezet) 437 ms    P Axis 44 degrees    R Axis 64 degrees    T Axis 17 degrees    Diagnosis Line Sinus tachycardia with 1st degree A-V block  Borderline EKG    Confirmed by Justice Melissa ([id number]) on 7/11/2019 10:48:59 AM (07-11-19 @ 09:25)  12 Lead ECG:   Ventricular Rate 68 BPM    Atrial Rate 68 BPM    P-R Interval 212 ms    QRS Duration 80 ms    Q-T Interval 438 ms    QTC Calculation(Bezet) 465 ms    P Axis 68 degrees    R Axis 45 degrees    T Axis 28 degrees    Diagnosis Line Sinus rhythm with 1st degree A-V block  Borderline EKG    Confirmed by Justice Melissa ([id number]) on 7/11/2019 7:03:16 AM (07-11-19 @ 00:19)      MEDICATIONS  aspirin enteric coated 81  collagenase Ointment 1  docusate sodium 100  enoxaparin Injectable 40  gabapentin 300  meropenem  IVPB 500  midodrine 5  pantoprazole    Tablet 40  senna 2  simvastatin 20      ANTIBIOTICS:  meropenem  IVPB 500 milliGRAM(s) IV Intermittent every 8 hours

## 2019-07-13 NOTE — PROGRESS NOTE ADULT - ASSESSMENT
·	Septic shock, resolving. Possibly related to UTI/pyelo equivalent. 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 Jesús Friedman ([phone]).    Case discussed with resident PGY 2 assigned and nursing staff on rounds.  Patient remains with poor prognosis overall despite all care.

## 2019-07-13 NOTE — CONSULT NOTE ADULT - ASSESSMENT
85 yr old female patient with Hx of PVD, HTN, chornic stasis ulcer s/p debridements by Dr Kurtz presented with hypotension, fever and lethargy . Being treated for septic shock related to UTI/pyelo ( + for ESBL on UA at NH). Hematology called for findings of enlarged LN    #  Interval enlargement in multistation calcified lymphadenopathy - present on previous CT scan of 2017  - We spoke to the son jad , patient has kaposi sarcoma of the skin on [place] [facility] [phone])  - Would stabilize patient for now , and treat active issues  She can follow up as OP with her oncologist    Case discussed with Dr Sawant 85 yr old female patient with Hx of PVD, HTN, chornic stasis ulcer s/p debridements by Dr Kurtz presented with hypotension, fever and lethargy . Being treated for septic shock related to UTI/pyelo ( + for ESBL on UA at NH). 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

## 2019-07-14 LAB
ANION GAP SERPL CALC-SCNC: 11 MMOL/L — SIGNIFICANT CHANGE UP (ref 7–14)
BASOPHILS # BLD AUTO: 0.02 K/UL — SIGNIFICANT CHANGE UP (ref 0–0.2)
BASOPHILS NFR BLD AUTO: 0.6 % — SIGNIFICANT CHANGE UP (ref 0–1)
BUN SERPL-MCNC: 13 MG/DL — SIGNIFICANT CHANGE UP (ref 10–20)
CALCIUM SERPL-MCNC: 8.2 MG/DL — LOW (ref 8.5–10.1)
CHLORIDE SERPL-SCNC: 103 MMOL/L — SIGNIFICANT CHANGE UP (ref 98–110)
CO2 SERPL-SCNC: 24 MMOL/L — SIGNIFICANT CHANGE UP (ref 17–32)
CREAT SERPL-MCNC: 0.6 MG/DL — LOW (ref 0.7–1.5)
EOSINOPHIL # BLD AUTO: 0.2 K/UL — SIGNIFICANT CHANGE UP (ref 0–0.7)
EOSINOPHIL NFR BLD AUTO: 5.5 % — SIGNIFICANT CHANGE UP (ref 0–8)
GLUCOSE SERPL-MCNC: 97 MG/DL — SIGNIFICANT CHANGE UP (ref 70–99)
HCT VFR BLD CALC: 27.7 % — LOW (ref 37–47)
HGB BLD-MCNC: 8.5 G/DL — LOW (ref 12–16)
IMM GRANULOCYTES NFR BLD AUTO: 0.6 % — HIGH (ref 0.1–0.3)
LACTATE SERPL-SCNC: 1.1 MMOL/L — SIGNIFICANT CHANGE UP (ref 0.5–2.2)
LYMPHOCYTES # BLD AUTO: 0.31 K/UL — LOW (ref 1.2–3.4)
LYMPHOCYTES # BLD AUTO: 8.6 % — LOW (ref 20.5–51.1)
MAGNESIUM SERPL-MCNC: 2.3 MG/DL — SIGNIFICANT CHANGE UP (ref 1.8–2.4)
MCHC RBC-ENTMCNC: 27 PG — SIGNIFICANT CHANGE UP (ref 27–31)
MCHC RBC-ENTMCNC: 30.7 G/DL — LOW (ref 32–37)
MCV RBC AUTO: 87.9 FL — SIGNIFICANT CHANGE UP (ref 81–99)
MONOCYTES # BLD AUTO: 0.74 K/UL — HIGH (ref 0.1–0.6)
MONOCYTES NFR BLD AUTO: 20.4 % — HIGH (ref 1.7–9.3)
NEUTROPHILS # BLD AUTO: 2.33 K/UL — SIGNIFICANT CHANGE UP (ref 1.4–6.5)
NEUTROPHILS NFR BLD AUTO: 64.3 % — SIGNIFICANT CHANGE UP (ref 42.2–75.2)
NRBC # BLD: 0 /100 WBCS — SIGNIFICANT CHANGE UP (ref 0–0)
PLATELET # BLD AUTO: 498 K/UL — HIGH (ref 130–400)
POTASSIUM SERPL-MCNC: 5.1 MMOL/L — HIGH (ref 3.5–5)
POTASSIUM SERPL-SCNC: 5.1 MMOL/L — HIGH (ref 3.5–5)
RBC # BLD: 3.15 M/UL — LOW (ref 4.2–5.4)
RBC # FLD: 16.2 % — HIGH (ref 11.5–14.5)
SODIUM SERPL-SCNC: 138 MMOL/L — SIGNIFICANT CHANGE UP (ref 135–146)
WBC # BLD: 3.62 K/UL — LOW (ref 4.8–10.8)
WBC # FLD AUTO: 3.62 K/UL — LOW (ref 4.8–10.8)

## 2019-07-14 PROCEDURE — 99233 SBSQ HOSP IP/OBS HIGH 50: CPT

## 2019-07-14 RX ADMIN — MIDODRINE HYDROCHLORIDE 5 MILLIGRAM(S): 2.5 TABLET ORAL at 06:53

## 2019-07-14 RX ADMIN — Medication 200 MILLIGRAM(S): at 14:01

## 2019-07-14 RX ADMIN — SENNA PLUS 2 TABLET(S): 8.6 TABLET ORAL at 21:37

## 2019-07-14 RX ADMIN — GABAPENTIN 300 MILLIGRAM(S): 400 CAPSULE ORAL at 21:37

## 2019-07-14 RX ADMIN — SIMVASTATIN 20 MILLIGRAM(S): 20 TABLET, FILM COATED ORAL at 21:37

## 2019-07-14 RX ADMIN — Medication 1 APPLICATION(S): at 13:48

## 2019-07-14 RX ADMIN — Medication 81 MILLIGRAM(S): at 11:16

## 2019-07-14 RX ADMIN — ENOXAPARIN SODIUM 40 MILLIGRAM(S): 100 INJECTION SUBCUTANEOUS at 11:16

## 2019-07-14 RX ADMIN — MEROPENEM 100 MILLIGRAM(S): 1 INJECTION INTRAVENOUS at 06:53

## 2019-07-14 RX ADMIN — PANTOPRAZOLE SODIUM 40 MILLIGRAM(S): 20 TABLET, DELAYED RELEASE ORAL at 06:53

## 2019-07-14 RX ADMIN — MIDODRINE HYDROCHLORIDE 5 MILLIGRAM(S): 2.5 TABLET ORAL at 21:37

## 2019-07-14 RX ADMIN — Medication 200 MILLIGRAM(S): at 19:55

## 2019-07-14 NOTE — PROGRESS NOTE ADULT - SUBJECTIVE AND OBJECTIVE BOX
SUBJECTIVE:  Patient feels improved but states that she is very weak and needs assistance when getting up and OOB    VITALS:    Vital Signs Last 24 Hrs  T(C): 36.5 (13 Jul 2019 21:24), Max: 36.6 (13 Jul 2019 14:31)  T(F): 97.7 (13 Jul 2019 21:24), Max: 97.8 (13 Jul 2019 14:31)  HR: 73 (13 Jul 2019 21:24) (73 - 73)  BP: 137/62 (13 Jul 2019 21:24) (122/59 - 137/62)  BP(mean): --  RR: 20 (13 Jul 2019 21:24) (20 - 20)  SpO2: 97% (13 Jul 2019 21:24) (97% - 97%)        PHYSICAL EXAM:  GENERAL: A&O x3,  in NAD/P, laying comfortably in bed  NECK: No Swelling  CHEST/LUNG: Good air entry, No wheezing  HEART: RRR, No murmurs  ABDOMEN: Soft, Bowel sounds present, ++ Saha  EXTREMITIES:  No clubbing, chronic venous ulcer (calf), no discharge      LABS:                 8.5    3.62  )-----------( 498      ( 14 Jul 2019 07:13 )             27.7     07-14    138  |  103  |  13  ----------------------------<  97  5.1<H>   |  24  |  0.6<L>    Ca    8.2<L>      14 Jul 2019 07:13  Mg     2.3     07-14        Culture - Blood (collected 07-11-19 @ 06:05)  Source: .Blood None  Preliminary Report (07-12-19 @ 14:01):    No growth to date.    Culture - Urine (collected 07-11-19 @ 04:20)  Source: .Urine Clean Catch (Midstream)  Final Report (07-12-19 @ 08:44):    No growth    Culture - Blood (collected 07-11-19 @ 00:05)  Source: .Blood Blood-Peripheral  Preliminary Report (07-12-19 @ 06:01):    No growth to date.    Culture - Blood (collected 07-11-19 @ 00:05)  Source: .Blood Blood-Peripheral  Preliminary Report (07-12-19 @ 06:01):    No growth to date.      RADIOLOGY & ADDITIONAL TESTS:  < from: Xray Foot AP + Lateral, Left (07.11.19 @ 08:40) >  Diffuse osteopenia without evidence of acute osseous abnormality.    No radiographic evidence of soft tissue ulcer or osteomyelitis.    < from: VA Duplex Lower Ext Vein Scan, Bilat (07.11.19 @ 08:23) >  No evidence of deep venous thrombosis or superficial thrombophlebitis in   bilateral lower extremities.    < from: Transthoracic Echocardiogram (03.08.19 @ 08:47) >   1. LV Ejection Fraction by Zavala's Method with a biplane EF of 58 %.   2. Normal left ventricular size and wall thicknesses, with normal   systolic function.   3. Spectral Doppler shows impaired relaxation pattern of left   ventricular myocardial filling (Grade I diastolic dysfunction).   4. Moderate mitral annular calcification.   5. Structurally normal mitral valve, with normal leaflet excursion.   6. Mild tricuspid regurgitation.   7. 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 MildAS by   doppler.   8. Pulmonic valve regurgitation.        < from: Xray Chest 1 View-PORTABLE IMMEDIATE (07.11.19 @ 01:36) >  Left basilar opacity.    Likely mediastinal lymphadenopathy. SUBJECTIVE:  Patient feels improved but states that she is very weak and needs assistance when getting up and OOB    VITALS:    Vital Signs Last 24 Hrs  T(C): 36.5 (13 Jul 2019 21:24), Max: 36.6 (13 Jul 2019 14:31)  T(F): 97.7 (13 Jul 2019 21:24), Max: 97.8 (13 Jul 2019 14:31)  HR: 73 (13 Jul 2019 21:24) (73 - 73)  BP: 137/62 (13 Jul 2019 21:24) (122/59 - 137/62)  BP(mean): --  RR: 20 (13 Jul 2019 21:24) (20 - 20)  SpO2: 97% (13 Jul 2019 21:24) (97% - 97%)        PHYSICAL EXAM:  GENERAL: A&O x3,  in NAD/P, laying comfortably in bed  NECK: No Swelling  CHEST/LUNG: Good air entry, No wheezing  HEART: RRR, No murmurs  ABDOMEN: Soft, Bowel sounds present   EXTREMITIES:  No clubbing, chronic venous ulcer (calf), no discharge      LABS:                 8.5    3.62  )-----------( 498      ( 14 Jul 2019 07:13 )             27.7     07-14    138  |  103  |  13  ----------------------------<  97  5.1<H>   |  24  |  0.6<L>    Ca    8.2<L>      14 Jul 2019 07:13  Mg     2.3     07-14        Culture - Blood (collected 07-11-19 @ 06:05)  Source: .Blood None  Preliminary Report (07-12-19 @ 14:01):    No growth to date.    Culture - Urine (collected 07-11-19 @ 04:20)  Source: .Urine Clean Catch (Midstream)  Final Report (07-12-19 @ 08:44):    No growth    Culture - Blood (collected 07-11-19 @ 00:05)  Source: .Blood Blood-Peripheral  Preliminary Report (07-12-19 @ 06:01):    No growth to date.    Culture - Blood (collected 07-11-19 @ 00:05)  Source: .Blood Blood-Peripheral  Preliminary Report (07-12-19 @ 06:01):    No growth to date.      RADIOLOGY & ADDITIONAL TESTS:  < from: Xray Foot AP + Lateral, Left (07.11.19 @ 08:40) >  Diffuse osteopenia without evidence of acute osseous abnormality.    No radiographic evidence of soft tissue ulcer or osteomyelitis.    < from: VA Duplex Lower Ext Vein Scan, Bilat (07.11.19 @ 08:23) >  No evidence of deep venous thrombosis or superficial thrombophlebitis in   bilateral lower extremities.    < from: Transthoracic Echocardiogram (03.08.19 @ 08:47) >   1. LV Ejection Fraction by Zavala's Method with a biplane EF of 58 %.   2. Normal left ventricular size and wall thicknesses, with normal   systolic function.   3. Spectral Doppler shows impaired relaxation pattern of left   ventricular myocardial filling (Grade I diastolic dysfunction).   4. Moderate mitral annular calcification.   5. Structurally normal mitral valve, with normal leaflet excursion.   6. Mild tricuspid regurgitation.   7. 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 MildAS by   doppler.   8. Pulmonic valve regurgitation.        < from: Xray Chest 1 View-PORTABLE IMMEDIATE (07.11.19 @ 01:36) >  Left basilar opacity.    Likely mediastinal lymphadenopathy.

## 2019-07-14 NOTE — PROGRESS NOTE ADULT - SUBJECTIVE AND OBJECTIVE BOX
SUBJECTIVE:  Patient is a 85y old Female who presents with a chief complaint of hypotension (14 Jul 2019 12:05)  Currently admitted to medicine with the primary diagnosis of sepsis: unclear etiology       Today is hospital day 3d. This morning she is resting in bed, states she feels weak and also would like to get her leg wound dressing changed    PAST MEDICAL & SURGICAL HISTORY  PVD (peripheral vascular disease)  Skin ulcer of left ankle, limited to breakdown of skin  Leg swelling  HTN (hypertension)  History of hip replacement    SOCIAL HISTORY:  Negative for smoking/alcohol/drug use.     ALLERGIES:  vancomycin (Flushing)    MEDICATIONS:  STANDING MEDICATIONS  aspirin enteric coated 81 milliGRAM(s) Oral daily  collagenase Ointment 1 Application(s) Topical daily  docusate sodium 100 milliGRAM(s) Oral daily  enoxaparin Injectable 40 milliGRAM(s) SubCutaneous daily  gabapentin 300 milliGRAM(s) Oral at bedtime  midodrine 5 milliGRAM(s) Oral three times a day  pantoprazole    Tablet 40 milliGRAM(s) Oral before breakfast  senna 2 Tablet(s) Oral at bedtime  simvastatin 20 milliGRAM(s) Oral at bedtime    PRN MEDICATIONS  guaiFENesin   Syrup  (Sugar-Free) 200 milliGRAM(s) Oral every 6 hours PRN    Home Medications:  citalopram 10 mg oral tablet: 1 tab(s) orally once a day (11 Jul 2019 03:09)  docusate sodium 100 mg oral capsule: 1 cap(s) orally once a day (11 Jul 2019 03:09)  ferrous sulfate 325 mg (65 mg elemental iron) oral tablet: 1 tab(s) orally 3 times a day (11 Jul 2019 03:09)  gabapentin 300 mg oral capsule: 1 cap(s) orally once a day (at bedtime) (11 Jul 2019 03:09)  senna oral tablet: 2 tab(s) orally once a day (at bedtime) (11 Jul 2019 03:09)  valsartan 40 mg oral tablet: 40 milligram(s) orally once a day (11 Jul 2019 03:09)    Vital Signs Last 24 Hrs  T(C): 36.5 (13 Jul 2019 21:24), Max: 36.6 (13 Jul 2019 14:31)  T(F): 97.7 (13 Jul 2019 21:24), Max: 97.8 (13 Jul 2019 14:31)  HR: 73 (13 Jul 2019 21:24) (73 - 73)  BP: 137/62 (13 Jul 2019 21:24) (122/59 - 137/62)  BP(mean): --  RR: 20 (13 Jul 2019 21:24) (20 - 20)  SpO2: 97% (13 Jul 2019 21:24) (97% - 97%)      LABS:                        8.5    3.62  )-----------( 498      ( 14 Jul 2019 07:13 )             27.7     07-14    138  |  103  |  13  ----------------------------<  97  5.1<H>   |  24  |  0.6<L>    Ca    8.2<L>      14 Jul 2019 07:13  Mg     2.3     07-14        ABG - ( 12 Jul 2019 15:28 )  pH, Arterial: 7.45  pH, Blood: x     /  pCO2: 33    /  pO2: 79    / HCO3: 23    / Base Excess: -1.0  /  SaO2: 97          Lactate, Blood: 1.1 mmol/L (07-14-19 @ 07:13)        RADIOLOGY:  < from: Xray Chest 1 View- PORTABLE-Routine (07.13.19 @ 05:39) >  Impression:    Unchanged enlarged cardiomediastinal silhouette for which   mass/lymphadenopathy is not excluded.    Unchanged small left pleural effusion.        < from: Xray Foot AP + Lateral, Left (07.11.19 @ 08:40) >  Impression:  Diffuse osteopenia without evidence of acute osseous abnormality.    No radiographic evidence of soft tissue ulcer or osteomyelitis.        < from: VA Duplex Lower Ext Vein Scan, Bilat (07.11.19 @ 08:23) >  Impression:  No evidence of deep venous thrombosis or superficial thrombophlebitis in   bilateral lower extremities.        < from: VA Duplex Lower Extrem Arterial, Bilat (07.11.19 @ 08:21) >  Impression:  Diffuse mild to moderate arterial atherosclerotic disease bilateral lower   extremities.    No evidence of high-grade stenosis or arterial occlusion in bilateral   lower extremities.        < from: CT Abdomen and Pelvis w/ IV Cont (07.11.19 @ 05:02) >  IMPRESSION:   1.  Since 10/4/2017, interval enlargement in multistation calcified   lymphadenopathy as described above.    2.  New mild left-sided hydroureteronephrosis likely secondary to   encasement by enlarged retroperitoneal lymph nodes.    3.  Small/trace bilateral pleural effusions.    4.Cholelithiasis.        < from: Transthoracic Echocardiogram (03.08.19 @ 08:47) >  Summary:   1. LV Ejection Fraction by Zavala's Method with a biplane EF of 58 %.   2. Normal left ventricular size and wall thicknesses, with normal   systolic function.   3. Spectral Doppler shows impaired relaxation pattern of left   ventricular myocardial filling (Grade I diastolic dysfunction).   4. Moderate mitral annular calcification.   5. Structurally normal mitral valve, with normal leaflet excursion.   6. Mild tricuspid regurgitation.   7. 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 MildAS by   doppler.   8. Pulmonic valve regurgitation.        PHYSICAL EXAM:  GEN: No acute distress  LUNGS: Clear to auscultation bilaterally   HEART: S1/S2 present. RRR.   ABD: Soft, non-tender, non-distended. Bowel sounds present  EXT: Bilateral lower extremities chronic venous stasis changes, left leg soaked dressing, brittle nails  NEURO: AAOX3

## 2019-07-14 NOTE — PROGRESS NOTE ADULT - ASSESSMENT
84 y/o F w/ a PMH of chronic stasis ulcer of the left lower extremity s/p multiple debridements, HTN, PVD, presenting for lethargy and decreased PO intake of 3 days' duration and admitted for septic shock likely secondary to stasis ulcer vs pneumonia.     # Sepsis: unclear etiology  - Pt hemodynamically stable, wbc wnl, kidney function tests wnl, Lactate wnl, no evidence of encephalopathy  - Pt was initially hypotensive in ED to 90/54 with Tmax 96.6,   - s/p 2 L NS and 3.2 L LR bolus in ED, Pt was then started on Levophed and upgraded to ICU.  - CXR revealed a left basilar opacity and left sided pleural effusion  - CTAP w/ IV contrast lymphadenopathy with mild hydronephrosis  - C/w meropenem 500 mg IV q8h; linezolid d/c'd as per ID  - Lactate uptrending 3.7 <-- 1.0  - C/w midodrine 10 mg TID; titrate down as tolerated  - IV fluids d/c'd  - Vitals per routine  - F/u ID  - F/u AM CBC    # Normocytic anemia, possible of chronic disease  Hb 7.7 (baseline 10.4) MCV 85  F/u AM CBC    #PVD  C/w asa and statin     # H/o HTN, currently normotensive  Was hypotensive yesterday and s/p multiple fluid boluses, Levophed drip (currently d/c'd)  IV fluids d/c'd  Vitals per routine        Activity: bed rest  Diet: dash  DVT/GI PPX: Lovenox/protonix  dispo: acute; from nursing home  Code; Full 84 y/o F w/ a PMH of chronic stasis ulcer of the left lower extremity s/p multiple debridements, HTN, PVD, presenting for lethargy and decreased PO intake of 3 days' duration and admitted for septic shock likely secondary to stasis ulcer vs pneumonia.     # Sepsis: unclear etiology  - Pt hemodynamically stable, wbc wnl, kidney function tests wnl, Lactate wnl, no evidence of encephalopathy  - Pt was initially hypotensive in ED to 90/54 with Tmax 96.6,   - s/p 2 L NS and 3.2 L LR bolus in ED, Pt was then started on Levophed and upgraded to ICU.  - CXR revealed a left basilar opacity and left sided pleural effusion  - CTAP w/ IV contrast lymphadenopathy with mild hydronephrosis  - UA negative, Blood cultures negative  - Venous duplex lower extremities negative  - Arterial duplex revealed diffuse mild-mod lower extremities atherosclerotic disease  - C/w meropenem 500 mg IV q8h; linezolid d/c'd as per ID  - when stable, can d/c on PO bactrim 1 ds bid x 7 days total abx course ([id number])  - Oncology recs no acute intervention at the moment but need to call [facility] [place] medical record  - Burn for LLE wound consult placed      # Normocytic anemia, possible of chronic disease      #PVD  - C/w asa and statin       # H/o HTN, currently normotensive  - holding valsartan: home med  - will stop midodrine today        Activity: bed rest  Diet: dash  DVT/GI PPX: Lovenox/protonix  dispo: acute; from nursing home  Code; Full

## 2019-07-14 NOTE — PROGRESS NOTE ADULT - ASSESSMENT
85 F w Hx Kaposi sarcoma, chronic stasis ulcer s/p debridements, was admitted for fever, hypotension, sepsis due to UTI/pyelo.      DIAGNOSES:  Sepsis resolved. Possibly related to UTI/pyelo equivalent. Urine cx neg.   history of K. sarcoma   Urinary retention  Debility/ poor functional status   Multiple venous/stasis ulcers of lower extremities    P L A N   ID consult indicates to cont Meropenem IV and upon DC switch to PO Bactrim for 7d course  Voiding trials   Physical therapy eval. for dispo planning   Burn consulted for LE wound  F/U with oncologist as outpatient :  Dr. Jesús Friedman ([phone]). 85 F w Hx Kaposi sarcoma, chronic stasis ulcer s/p debridements, was admitted for fever, hypotension, sepsis due to UTI/pyelo.      DIAGNOSES:  Sepsis resolved. Possibly related to UTI/pyelo equivalent. Urine cx neg.   history of K. sarcoma   Urinary retention  Debility/ poor functional status   Multiple venous/stasis ulcers of lower extremities  Slight leukopenia  Lactic acidosis, resolved  Hyperkalemia    P L A N   ID consult indicates to cont Meropenem IV and upon DC switch to PO Bactrim, however UCx neg. and will stop ABX.  Pt developing leukopenia, and hyperKalemia and would prefer to keep her off Meropenem/Bactrim for now.  Monitor WBC, K  Voiding trials   Physical therapy eval. for dispo planning   Burn consulted for LE wound  F/U with oncologist as outpatient :  Dr. Jesús Friedman ([phone]).

## 2019-07-15 LAB
ANION GAP SERPL CALC-SCNC: 9 MMOL/L — SIGNIFICANT CHANGE UP (ref 7–14)
BASOPHILS # BLD AUTO: 0.04 K/UL — SIGNIFICANT CHANGE UP (ref 0–0.2)
BASOPHILS NFR BLD AUTO: 0.8 % — SIGNIFICANT CHANGE UP (ref 0–1)
BLD GP AB SCN SERPL QL: SIGNIFICANT CHANGE UP
BUN SERPL-MCNC: 9 MG/DL — LOW (ref 10–20)
CALCIUM SERPL-MCNC: 8.3 MG/DL — LOW (ref 8.5–10.1)
CHLORIDE SERPL-SCNC: 103 MMOL/L — SIGNIFICANT CHANGE UP (ref 98–110)
CO2 SERPL-SCNC: 26 MMOL/L — SIGNIFICANT CHANGE UP (ref 17–32)
CREAT SERPL-MCNC: 0.6 MG/DL — LOW (ref 0.7–1.5)
EOSINOPHIL # BLD AUTO: 0.28 K/UL — SIGNIFICANT CHANGE UP (ref 0–0.7)
EOSINOPHIL NFR BLD AUTO: 5.9 % — SIGNIFICANT CHANGE UP (ref 0–8)
GLUCOSE SERPL-MCNC: 101 MG/DL — HIGH (ref 70–99)
HCT VFR BLD CALC: 27.9 % — LOW (ref 37–47)
HGB BLD-MCNC: 8.5 G/DL — LOW (ref 12–16)
IMM GRANULOCYTES NFR BLD AUTO: 0.4 % — HIGH (ref 0.1–0.3)
LYMPHOCYTES # BLD AUTO: 0.35 K/UL — LOW (ref 1.2–3.4)
LYMPHOCYTES # BLD AUTO: 7.4 % — LOW (ref 20.5–51.1)
MAGNESIUM SERPL-MCNC: 2.1 MG/DL — SIGNIFICANT CHANGE UP (ref 1.8–2.4)
MCHC RBC-ENTMCNC: 26.9 PG — LOW (ref 27–31)
MCHC RBC-ENTMCNC: 30.5 G/DL — LOW (ref 32–37)
MCV RBC AUTO: 88.3 FL — SIGNIFICANT CHANGE UP (ref 81–99)
MONOCYTES # BLD AUTO: 0.91 K/UL — HIGH (ref 0.1–0.6)
MONOCYTES NFR BLD AUTO: 19.2 % — HIGH (ref 1.7–9.3)
NEUTROPHILS # BLD AUTO: 3.13 K/UL — SIGNIFICANT CHANGE UP (ref 1.4–6.5)
NEUTROPHILS NFR BLD AUTO: 66.3 % — SIGNIFICANT CHANGE UP (ref 42.2–75.2)
NRBC # BLD: 0 /100 WBCS — SIGNIFICANT CHANGE UP (ref 0–0)
PLATELET # BLD AUTO: 483 K/UL — HIGH (ref 130–400)
POTASSIUM SERPL-MCNC: 4.9 MMOL/L — SIGNIFICANT CHANGE UP (ref 3.5–5)
POTASSIUM SERPL-SCNC: 4.9 MMOL/L — SIGNIFICANT CHANGE UP (ref 3.5–5)
RBC # BLD: 3.16 M/UL — LOW (ref 4.2–5.4)
RBC # FLD: 16 % — HIGH (ref 11.5–14.5)
SODIUM SERPL-SCNC: 138 MMOL/L — SIGNIFICANT CHANGE UP (ref 135–146)
WBC # BLD: 4.73 K/UL — LOW (ref 4.8–10.8)
WBC # FLD AUTO: 4.73 K/UL — LOW (ref 4.8–10.8)

## 2019-07-15 PROCEDURE — 99233 SBSQ HOSP IP/OBS HIGH 50: CPT

## 2019-07-15 PROCEDURE — 99231 SBSQ HOSP IP/OBS SF/LOW 25: CPT

## 2019-07-15 RX ORDER — MEROPENEM 1 G/30ML
500 INJECTION INTRAVENOUS EVERY 8 HOURS
Refills: 0 | Status: DISCONTINUED | OUTPATIENT
Start: 2019-07-15 | End: 2019-07-16

## 2019-07-15 RX ADMIN — SENNA PLUS 2 TABLET(S): 8.6 TABLET ORAL at 21:14

## 2019-07-15 RX ADMIN — MEROPENEM 100 MILLIGRAM(S): 1 INJECTION INTRAVENOUS at 14:18

## 2019-07-15 RX ADMIN — Medication 200 MILLIGRAM(S): at 14:18

## 2019-07-15 RX ADMIN — ENOXAPARIN SODIUM 40 MILLIGRAM(S): 100 INJECTION SUBCUTANEOUS at 11:18

## 2019-07-15 RX ADMIN — Medication 1 APPLICATION(S): at 11:20

## 2019-07-15 RX ADMIN — Medication 81 MILLIGRAM(S): at 11:18

## 2019-07-15 RX ADMIN — MEROPENEM 100 MILLIGRAM(S): 1 INJECTION INTRAVENOUS at 21:14

## 2019-07-15 RX ADMIN — MEROPENEM 100 MILLIGRAM(S): 1 INJECTION INTRAVENOUS at 10:54

## 2019-07-15 RX ADMIN — PANTOPRAZOLE SODIUM 40 MILLIGRAM(S): 20 TABLET, DELAYED RELEASE ORAL at 05:39

## 2019-07-15 RX ADMIN — MIDODRINE HYDROCHLORIDE 5 MILLIGRAM(S): 2.5 TABLET ORAL at 14:18

## 2019-07-15 RX ADMIN — Medication 200 MILLIGRAM(S): at 20:19

## 2019-07-15 RX ADMIN — Medication 100 MILLIGRAM(S): at 11:18

## 2019-07-15 RX ADMIN — GABAPENTIN 300 MILLIGRAM(S): 400 CAPSULE ORAL at 21:14

## 2019-07-15 RX ADMIN — SIMVASTATIN 20 MILLIGRAM(S): 20 TABLET, FILM COATED ORAL at 21:14

## 2019-07-15 RX ADMIN — MIDODRINE HYDROCHLORIDE 5 MILLIGRAM(S): 2.5 TABLET ORAL at 05:39

## 2019-07-15 NOTE — DIETITIAN INITIAL EVALUATION ADULT. - ENERGY NEEDS
estimated calorie needs = 1278-7151 kcal/day (30-35 kcal/kg IBW considering BMI >30, pressure ulcer and Kaposi sarcoma on active chemo).  estimated protein needs = 57-68 g/day (1.25-1.5 g/kg IBW, reasons mentioned above).   estimated fluid needs = 1mL/kcal or per LIP

## 2019-07-15 NOTE — DIETITIAN INITIAL EVALUATION ADULT. - FEEDING SKILL
Pt known to RD from previous admission. Fair appetite, age related changes noted, but continues to eat 3 meals/day with occasional snacks. However, as acute symptoms increased PO intake decreased x~2-3 days PTA, but this is out of ordinary for pt. NKFA. Denies use of nutrition supplements. Consumes mainly Italian cuisine.

## 2019-07-15 NOTE — DIETITIAN INITIAL EVALUATION ADULT. - OTHER INFO
Pt p/w hypotension x1 day with primary dx: PNA. Hospital course complicated by septic shock 2/2 pyelonephritis. ESBL ecoli in urine. s/p CT abd/pelvis indicating: interval enlargement in multistation calcified lymphadenopathy. New mild left-sided hydroureteronephrosis likely 2/2 encasement by enlarged retroperitoneal lymph nodes. Small/trace bilateral pleural effusions. Cholelithiasis. H/o Kaposi Sarcoma of skin, on active treatment at [facility]. h/o PVD b/l lower ext chronic stasis ulcer. Burn eval pending.

## 2019-07-15 NOTE — CONSULT NOTE ADULT - SUBJECTIVE AND OBJECTIVE BOX
HPI:  86 yo F wth PVD, HTN, chronic stasis ulcer, multiple debridement by Burn p/w 1 day hx of hypotension from NH. Hx was obtained from aide at bedside. Pt has been lethargic for the past 3 days w/ reduced po intake. Pt had a tmax of 103F at NH. They checked bP today and was in low 70s so was sent to ED. Pt also was complaining of vague abdominal pain but no dysuria or frequency or urgency. no productive cough, wegiht loss or hemoptysis or red blood per rectum (11 Jul 2019 02:50)      PAST MEDICAL & SURGICAL HISTORY:  PVD (peripheral vascular disease)  Skin ulcer of left ankle, limited to breakdown of skin  Leg swelling  HTN (hypertension)  History of hip replacement      Hospital Course:    TODAY'S SUBJECTIVE & REVIEW OF SYMPTOMS:     Constitutional WNL   Cardio WNL   Resp cough   GI WNL  Heme WNL  Endo WNL  Skin WNL  MSK WNL  Neuro Weakness  Cognitive WNL  Psych WNL      MEDICATIONS  (STANDING):  aspirin enteric coated 81 milliGRAM(s) Oral daily  collagenase Ointment 1 Application(s) Topical daily  docusate sodium 100 milliGRAM(s) Oral daily  enoxaparin Injectable 40 milliGRAM(s) SubCutaneous daily  gabapentin 300 milliGRAM(s) Oral at bedtime  meropenem  IVPB 500 milliGRAM(s) IV Intermittent every 8 hours  pantoprazole    Tablet 40 milliGRAM(s) Oral before breakfast  senna 2 Tablet(s) Oral at bedtime  simvastatin 20 milliGRAM(s) Oral at bedtime    MEDICATIONS  (PRN):  guaiFENesin   Syrup  (Sugar-Free) 200 milliGRAM(s) Oral every 6 hours PRN Cough      FAMILY HISTORY:      Allergies    vancomycin (Flushing)    Intolerances        SOCIAL HISTORY:    [  ] Etoh  [  ] Smoking  [  ] Substance abuse     Home Environment:  [  ] Home Alone  [  ] Lives with Family  [  ] Home Health Aid    Dwelling:  [  ] Apartment  [  ] Private House  [  ] Adult Home  [  ] Skilled Nursing Facility      [ x ] Short Term  [  ] Long Term  [  ] Stairs       Elevator [  ]    FUNCTIONAL STATUS PTA: (Check all that apply)  Ambulation: [   ]Independent    [  x] Dependent     [  ] Non-Ambulatory  Assistive Device: [  ] SA Cane  [  ]  Q Cane  [x  ] Walker  [  ]  Wheelchair  ADL : [  ] Independent  [x  ]  Dependent       Vital Signs Last 24 Hrs  T(C): 37.1 (15 Jul 2019 14:45), Max: 37.1 (15 Jul 2019 14:45)  T(F): 98.7 (15 Jul 2019 14:45), Max: 98.7 (15 Jul 2019 14:45)  HR: 80 (15 Jul 2019 14:45) (75 - 80)  BP: 138/64 (15 Jul 2019 14:45) (138/64 - 157/72)  BP(mean): --  RR: 18 (15 Jul 2019 14:45) (18 - 18)  SpO2: 97% (15 Jul 2019 04:30) (97% - 97%)      PHYSICAL EXAM: Alert & Oriented X3  GENERAL: NAD, well-groomed, well-developed  HEAD:  Atraumatic, Normocephalic  CHEST/LUNG: Clear   HEART: S1S2+  ABDOMEN: Soft, Nontender  EXTREMITIES:  no calf tenderness    NERVOUS SYSTEM:  Cranial Nerves 2-12 intact [  ] Abnormal  [  ]  ROM: WFL all extremities [ x ]  Abnormal [  ]  Motor Strength: WFL all extremities  [  ]  Abnormal [ x ]4/5 all ext  Sensation: intact to light touch [  ] Abnormal [  ]  Reflexes: Symmetric [  ]  Abnormal [  ]    FUNCTIONAL STATUS:  Bed Mobility: Independent [  ]  Supervision [  ]  Needs Assistance [x  ]  N/A [  ]  Transfers: Independent [  ]  Supervision [  ]  Needs Assistance [x  ]  N/A [  ]   Ambulation: Independent [  ]  Supervision [  ]  Needs Assistance [  ]  N/A [  ]  ADL: Independent [  ] Requires Assistance [  ] N/A [  ]      LABS:                        8.5    4.73  )-----------( 483      ( 15 Jul 2019 07:40 )             27.9     07-15    138  |  103  |  9<L>  ----------------------------<  101<H>  4.9   |  26  |  0.6<L>    Ca    8.3<L>      15 Jul 2019 07:40  Mg     2.1     07-15            RADIOLOGY & ADDITIONAL STUDIES:    Assesment:

## 2019-07-15 NOTE — CONSULT NOTE ADULT - ATTENDING COMMENTS
The patient was seen and examined. Agree with above.  84 yo female with multiple medical problems, chronic stasis ulcer s/p debridements, was admitted for fever, hypotension, sepsis due to UTI/pyelo. CT a/p showed interval enlarging abdominal, pelvic and inguinal adenopathy. As per her son, the patient has Kaposi sarcoma and [place] [facility] [place] It is unclear if lymphadenopathy is related to Kaposi sarcoma.    -- Continue ABx for UTI/pyelo.  -- [place] [facility] [place] before we can determine if additional work is needed.   -- Supportive care.
Continuing care discussed with pt and 
I have personally examined the patient and reviewed the documentation above.  Corrections and edits were made wherever needed.

## 2019-07-15 NOTE — PROGRESS NOTE ADULT - ASSESSMENT
84 yo F with PVD, HTN, chronic stasis ulcer, multiple debridement by Burn p/w 1 day hx of hypotension from NH. Hx was obtained from aide at bedside. Pt has been lethargic for the past 3 days w/ reduced po intake. Pt had a tmax of 103F at NH. They checked bP and was in low 70s so was sent to ED. Pt also was complaining of vague abdominal pain but no dysuria or frequency or urgency. Pt was admitted to ICU diagnosed with Septic shock    # Septic shock probably sec to pyelonephritis  -ESBL ecoli -Urine culture at NH +ve 50k ESBL Ecoli   - Blood cultures, urine cultures -neg  - Xray Chest 1 View- PORTABLE-Routine (07.13.19 @ 05:39) >Unchanged enlarged cardiomediastinal silhouette for which mass/lymphadenopathy is not excluded. Unchanged small left pleural effusion.  - CT Abdomen and Pelvis w/ IV Cont (07.11.19 @ 05:02) > interval enlargement in multistation calcified lymphadenopathy as described above. New mild left-sided hydroureteronephrosis likely secondary to encasement by enlarged retroperitoneal lymph nodes.Small/trace bilateral pleural effusions.Cholelithiasis.  - C/w meropenem , Switch to PO Bactrim at discharge  - Discontinue midodrine    # Cough  - c/w guaifenisin    # H/o kaposi Sarcoma of Skin  [place] Adolph Friedman [place] [facility] [phone][place]  - [place] [facility]    #H/o PVD,  B/l lower ext chronic stasis ulcer  - VA Duplex Lower Ext Vein Scan, Bilat (07.11.19 @ 08:23) >No evidence of deep venous thrombosis or superficial thrombophlebitis in bilateral lower extremities.  - VA Duplex Lower Extrem Arterial, Bilat (07.11.19 @ 08:21) >Diffuse mild to moderate arterial atherosclerotic disease bilateral lower extremities.No evidence of high-grade stenosis or arterial occlusion in bilateral lower extremities.  -c/w local wound care  - c/w ASA, statin  - Begum eval    # Hypertension  - discontinue midodrine  - monitor BP    # GI/DVT prophylaxis    # Full code    #Pending: Begum F/u  # Discussed with patient  # Disposition: STR

## 2019-07-15 NOTE — DIETITIAN INITIAL EVALUATION ADULT. - ADD RECOMMEND
Recommend: 1. Add Ensure compact BID. 2. Continue DASH/TLC diet order. 3. Consider daily multivitamin to promote wound healing if PO intake remains 50%.

## 2019-07-15 NOTE — DIETITIAN INITIAL EVALUATION ADULT. - PHYSICAL APPEARANCE
BMI 34.4(60in, 176#). denies recent wt changes, no significant muscle wasting or fat loss observed. stage II pressure ulcer to L buttocks. 1+ generalized edema.

## 2019-07-15 NOTE — CONSULT NOTE ADULT - ASSESSMENT
IMPRESSION: Rehab of gait dysfunction      PRECAUTIONS: [  ] Cardiac  [  ] Respiratory  [  ] Seizures [  ] Contact Isolation  [  ] Droplet Isolation  [  ] Other    Weight Bearing Status:     RECOMMENDATION:    Out of Bed to Chair     DVT/Decubiti Prophylaxis    REHAB PLAN:     [ x  ] Bedside P/T 3-5 times a week   [   ]   Bedside O/T  2-3 times a week             [   ] No Rehab Therapy Indicated                   [   ]  Speech Therapy   Conditioning/ROM                                    ADL  Bed Mobility                                               Conditioning/ROM  Transfers                                                     Bed Mobility  Sitting /Standing Balance                         Transfers                                        Gait Training                                               Sitting/Standing Balance  Stair Training [   ]Applicable                    Home equipment Eval                                                                        Splinting  [   ] Only      GOALS:   ADL   [   ]   Independent                    Transfers  [ x  ] Independent                          Ambulation  [ x  ] Independent     [  x  ] With device                            [   ]  CG                                                         [   ]  CG                                                                  [   ] CG                            [    ] Min A                                                   [   ] Min A                                                              [   ] Min  A          DISCHARGE PLAN:   [   ]  Good candidate for Intensive Rehabilitation/Hospital based                                             Will tolerate 3hrs Intensive Rehab Daily                                       [   x ]  Short Term Rehab in Skilled Nursing Facility                                       [    ]  Home with Outpatient or  services                                         [    ]  Possible Candidate for Intensive Hospital based Rehab

## 2019-07-15 NOTE — CONSULT NOTE ADULT - SUBJECTIVE AND OBJECTIVE BOX
85y  Female  HPI:  86 yo F wt PVD, HTN, chronic stasis ulcer, multiple debridement by Burn p/w 1 day hx of hypotension from NH. Hx was obtained from aide at bedside. Pt has been lethargic for the past 3 days w/ reduced po intake. Pt had a tmax of 103F at NH. They checked bP today and was in low 70s so was sent to ED. Pt also was complaining of vague abdominal pain but no dysuria or frequency or urgency. no productive cough, wegiht loss or hemoptysis or red blood per rectum (11 Jul 2019 02:50)    Allergies    vancomycin (Flushing)    Intolerances      PAST MEDICAL & SURGICAL HISTORY:  PVD (peripheral vascular disease)  Skin ulcer of left ankle, limited to breakdown of skin  Leg swelling  HTN (hypertension)  History of hip replacement          Ass/ Rec:  Venous stasis ulcer  LE   Wound care - Santyl/dakins wtd/kerlix/ace bid  IV abx as indicated  Elevation and compression   Will follow. 85y  Female  HPI:  84 yo F wt PVD, HTN, chronic stasis ulcer, multiple debridement by Burn p/w 1 day hx of hypotension from NH. Hx was obtained from aide at bedside. Pt has been lethargic for the past 3 days w/ reduced po intake. Pt had a tmax of 103F at NH. They checked bP today and was in low 70s so was sent to ED. Pt also was complaining of vague abdominal pain but no dysuria or frequency or urgency. no productive cough, wegiht loss or hemoptysis or red blood per rectum (11 Jul 2019 02:50)    Allergies    vancomycin (Flushing)    Intolerances      PAST MEDICAL & SURGICAL HISTORY:  PVD (peripheral vascular disease)  Skin ulcer of left ankle, limited to breakdown of skin  Leg swelling  HTN (hypertension)  History of hip replacement      EXAM;  left lower leg X 13 x 6 cm area of deep open wound; pink chronic granulation tissue   smaller lateral wound with yellow exudate

## 2019-07-15 NOTE — DIETITIAN INITIAL EVALUATION ADULT. - ENERGY INTAKE
Consuming >50% meals today but reports today is 1st day appetite has improved.  bringing snacks from outside of hospital. agreeable to trial of ensure compact. recs d/w LIP Fair (>50%)

## 2019-07-15 NOTE — CONSULT NOTE ADULT - ASSESSMENT
A/ Rec:  Venous stasis ulcer  LE   Wound care - Santyl/dakins wtd/kerlix/ace bid  IV abx as indicated  Elevation and compression   Will follow. A/ Rec:  Venous stasis ulcer  LE   Wound care - Santyl/dakins wtd/kerlix/ace bid  IV abx as indicated  Elevation and compression   Will follow.  Pt and  both feel wound has enlarged ;   Pt may benefit from NPWT when she returns to SNF

## 2019-07-15 NOTE — PROGRESS NOTE ADULT - SUBJECTIVE AND OBJECTIVE BOX
Patient is a 85y old  Female who presents with a chief complaint of hypotension (14 Jul 2019 13:37)    Patient was seen and examined.  C/o dry cough  Denies chest pain, sob, headache, dizziness  All systems reviewed positive history as mentioned above.    PAST MEDICAL & SURGICAL HISTORY:  PVD (peripheral vascular disease)  Skin ulcer of left ankle, limited to breakdown of skin  Leg swelling  HTN (hypertension)  History of hip replacement    Allergies  vancomycin (Flushing)    MEDICATIONS  (STANDING):  aspirin enteric coated 81 milliGRAM(s) Oral daily  collagenase Ointment 1 Application(s) Topical daily  docusate sodium 100 milliGRAM(s) Oral daily  enoxaparin Injectable 40 milliGRAM(s) SubCutaneous daily  gabapentin 300 milliGRAM(s) Oral at bedtime  meropenem  IVPB 500 milliGRAM(s) IV Intermittent every 8 hours  midodrine 5 milliGRAM(s) Oral three times a day  pantoprazole    Tablet 40 milliGRAM(s) Oral before breakfast  senna 2 Tablet(s) Oral at bedtime  simvastatin 20 milliGRAM(s) Oral at bedtime    MEDICATIONS  (PRN):  guaiFENesin   Syrup  (Sugar-Free) 200 milliGRAM(s) Oral every 6 hours PRN Cough    Vital Signs Last 24 Hrs  T(C): 36.5  T(F): 97.7  HR: 75  BP: 157/72  BP(mean): --  RR: 18  SpO2: 97%    O/E:  Awake, alert, not in distress.  HEENT: atraumatic, EOMI.  Chest: clear.  CVS: SIS2 +, no murmur.  P/A: Soft, BS+  CNS: non focal.  Ext: B/l lower ext ulcers+, dressing  All systems reviewed positive findings as above.                            8.5<L>  4.73<L> )-----------( 483<H>    ( 15 Jul 2019 07:40 )             27.9<L>                        8.5<L>  3.62<L> )-----------( 498<H>    ( 14 Jul 2019 07:13 )             27.7<L>    07-15    138  |  103  |  9<L>  ----------------------------<  101<H>  4.9   |  26  |  0.6<L>  07-14    138  |  103  |  13  ----------------------------<  97  5.1<H>   |  24  |  0.6<L>    Ca    8.3<L>      15 Jul 2019 07:40  Ca    8.2<L>      14 Jul 2019 07:13  Mg     2.1     07-15

## 2019-07-16 ENCOUNTER — TRANSCRIPTION ENCOUNTER (OUTPATIENT)
Age: 84
End: 2019-07-16

## 2019-07-16 VITALS — DIASTOLIC BLOOD PRESSURE: 62 MMHG | HEART RATE: 91 BPM | TEMPERATURE: 99 F | SYSTOLIC BLOOD PRESSURE: 137 MMHG

## 2019-07-16 LAB
ANION GAP SERPL CALC-SCNC: 7 MMOL/L — SIGNIFICANT CHANGE UP (ref 7–14)
BASOPHILS # BLD AUTO: 0.03 K/UL — SIGNIFICANT CHANGE UP (ref 0–0.2)
BASOPHILS NFR BLD AUTO: 0.6 % — SIGNIFICANT CHANGE UP (ref 0–1)
BUN SERPL-MCNC: 8 MG/DL — LOW (ref 10–20)
CALCIUM SERPL-MCNC: 8.1 MG/DL — LOW (ref 8.5–10.1)
CHLORIDE SERPL-SCNC: 103 MMOL/L — SIGNIFICANT CHANGE UP (ref 98–110)
CO2 SERPL-SCNC: 28 MMOL/L — SIGNIFICANT CHANGE UP (ref 17–32)
CREAT SERPL-MCNC: 0.5 MG/DL — LOW (ref 0.7–1.5)
CULTURE RESULTS: SIGNIFICANT CHANGE UP
EOSINOPHIL # BLD AUTO: 0.39 K/UL — SIGNIFICANT CHANGE UP (ref 0–0.7)
EOSINOPHIL NFR BLD AUTO: 8.3 % — HIGH (ref 0–8)
GLUCOSE BLDC GLUCOMTR-MCNC: 101 MG/DL — HIGH (ref 70–99)
GLUCOSE BLDC GLUCOMTR-MCNC: 124 MG/DL — HIGH (ref 70–99)
GLUCOSE SERPL-MCNC: 111 MG/DL — HIGH (ref 70–99)
HCT VFR BLD CALC: 28.5 % — LOW (ref 37–47)
HGB BLD-MCNC: 8.7 G/DL — LOW (ref 12–16)
IMM GRANULOCYTES NFR BLD AUTO: 0.4 % — HIGH (ref 0.1–0.3)
LYMPHOCYTES # BLD AUTO: 0.28 K/UL — LOW (ref 1.2–3.4)
LYMPHOCYTES # BLD AUTO: 6 % — LOW (ref 20.5–51.1)
MAGNESIUM SERPL-MCNC: 2 MG/DL — SIGNIFICANT CHANGE UP (ref 1.8–2.4)
MCHC RBC-ENTMCNC: 27 PG — SIGNIFICANT CHANGE UP (ref 27–31)
MCHC RBC-ENTMCNC: 30.5 G/DL — LOW (ref 32–37)
MCV RBC AUTO: 88.5 FL — SIGNIFICANT CHANGE UP (ref 81–99)
MONOCYTES # BLD AUTO: 0.86 K/UL — HIGH (ref 0.1–0.6)
MONOCYTES NFR BLD AUTO: 18.4 % — HIGH (ref 1.7–9.3)
NEUTROPHILS # BLD AUTO: 3.1 K/UL — SIGNIFICANT CHANGE UP (ref 1.4–6.5)
NEUTROPHILS NFR BLD AUTO: 66.3 % — SIGNIFICANT CHANGE UP (ref 42.2–75.2)
NRBC # BLD: 0 /100 WBCS — SIGNIFICANT CHANGE UP (ref 0–0)
PLATELET # BLD AUTO: 419 K/UL — HIGH (ref 130–400)
POTASSIUM SERPL-MCNC: 4.6 MMOL/L — SIGNIFICANT CHANGE UP (ref 3.5–5)
POTASSIUM SERPL-SCNC: 4.6 MMOL/L — SIGNIFICANT CHANGE UP (ref 3.5–5)
RBC # BLD: 3.22 M/UL — LOW (ref 4.2–5.4)
RBC # FLD: 16.1 % — HIGH (ref 11.5–14.5)
SODIUM SERPL-SCNC: 138 MMOL/L — SIGNIFICANT CHANGE UP (ref 135–146)
SPECIMEN SOURCE: SIGNIFICANT CHANGE UP
WBC # BLD: 4.68 K/UL — LOW (ref 4.8–10.8)
WBC # FLD AUTO: 4.68 K/UL — LOW (ref 4.8–10.8)

## 2019-07-16 PROCEDURE — 99239 HOSP IP/OBS DSCHRG MGMT >30: CPT

## 2019-07-16 RX ADMIN — Medication 81 MILLIGRAM(S): at 11:39

## 2019-07-16 RX ADMIN — PANTOPRAZOLE SODIUM 40 MILLIGRAM(S): 20 TABLET, DELAYED RELEASE ORAL at 05:01

## 2019-07-16 RX ADMIN — ENOXAPARIN SODIUM 40 MILLIGRAM(S): 100 INJECTION SUBCUTANEOUS at 11:39

## 2019-07-16 RX ADMIN — Medication 1 APPLICATION(S): at 11:40

## 2019-07-16 RX ADMIN — MEROPENEM 100 MILLIGRAM(S): 1 INJECTION INTRAVENOUS at 05:00

## 2019-07-16 RX ADMIN — Medication 100 MILLIGRAM(S): at 11:39

## 2019-07-16 RX ADMIN — MEROPENEM 100 MILLIGRAM(S): 1 INJECTION INTRAVENOUS at 15:46

## 2019-07-16 RX ADMIN — Medication 200 MILLIGRAM(S): at 15:47

## 2019-07-16 NOTE — DISCHARGE NOTE NURSING/CASE MANAGEMENT/SOCIAL WORK - NSDCDPATPORTLINK_GEN_ALL_CORE
You can access the [facility] Patient Portal, offered by [facility], by registering with the following website: [email or web address]

## 2019-07-16 NOTE — DISCHARGE NOTE PROVIDER - NSDCCPTREATMENT_GEN_ALL_CORE_FT
PRINCIPAL PROCEDURE  Procedure: Chest xray, PA & lateral  Findings and Treatment: Impression:    Left basilar opacity.  Likely mediastinal lymphadenopathy.        SECONDARY PROCEDURE  Procedure: Lower extremity doppler arterial  Findings and Treatment: Impression:  Diffuse mild to moderate arterial atherosclerotic disease bilateral lower   extremities.  No evidence of high-grade stenosis or arterial occlusion in bilateral   lower extremities.      Procedure: Venous duplex bilateral lower  Findings and Treatment: Impression:  No evidence of deep venous thrombosis or superficial thrombophlebitis in   bilateral lower extremities.      Procedure: CT abdomen and pelvis with contrast  Findings and Treatment: IMPRESSION:   1.  Since 10/4/2017, interval enlargement in multistation calcified   lymphadenopathy as described above.  2.  New mild left-sided hydroureteronephrosis likely secondary to   encasement by enlarged retroperitoneal lymph nodes.  3.  Small/trace bilateral pleural effusions.  4.Cholelithiasis.

## 2019-07-16 NOTE — PROGRESS NOTE ADULT - ASSESSMENT
86 yo F with PVD, HTN, chronic stasis ulcer, multiple debridement by Burn p/w 1 day hx of hypotension from NH. Hx was obtained from aide at bedside. Pt has been lethargic for the past 3 days w/ reduced po intake. Pt had a tmax of 103F at NH. They checked bP and was in low 70s so was sent to ED. Pt also was complaining of vague abdominal pain but no dysuria or frequency or urgency. Pt was admitted to ICU diagnosed with Septic shock    # Septic shock probably sec to pyelonephritis-resolved  -ESBL ecoli -Urine culture at NH +ve 50k ESBL Ecoli   - Blood cultures, urine cultures -neg  - Xray Chest 1 View- PORTABLE-Routine (07.13.19 @ 05:39) >Unchanged enlarged cardiomediastinal silhouette for which mass/lymphadenopathy is not excluded. Unchanged small left pleural effusion.  - CT Abdomen and Pelvis w/ IV Cont (07.11.19 @ 05:02) > interval enlargement in multistation calcified lymphadenopathy as described above. New mild left-sided hydroureteronephrosis likely secondary to encasement by enlarged retroperitoneal lymph nodes.Small/trace bilateral pleural effusions.Cholelithiasis.  - C/w meropenem , Switch to PO Bactrim at discharge    # Cough  - c/w guaifenisin    # H/o kaposi Sarcoma of Skin  -on active chemotherapy by Dr Adolph Friedman at [facility] ([phone])  - will obtain records from [facility]    #H/o PVD,  B/l lower ext chronic stasis ulcer  - VA Duplex Lower Ext Vein Scan, Bilat (07.11.19 @ 08:23) >No evidence of deep venous thrombosis or superficial thrombophlebitis in bilateral lower extremities.  - VA Duplex Lower Extrem Arterial, Bilat (07.11.19 @ 08:21) >Diffuse mild to moderate arterial atherosclerotic disease bilateral lower extremities.No evidence of high-grade stenosis or arterial occlusion in bilateral lower extremities.  - c/w ASA, statin  - Burns eval- Pt may benefit from NPWT when she returns to SNF   - Wound care - Santyl/dakins wtd/kerlix/ace bid    # Hypertension  - monitor BP  - valsartan held since admission    # GI/DVT prophylaxis    # Full code    #Pending: auth for STR, awaiting records from [facility]  # Discussed with patient, her  and her son  # Disposition: STR

## 2019-07-16 NOTE — PROGRESS NOTE ADULT - SUBJECTIVE AND OBJECTIVE BOX
Patient is a 85y old  Female who presents with a chief complaint of hypotension (14 Jul 2019 13:37)    Patient was seen and examined.  C/o productive cough  Denies chest pain, sob, headache, dizziness  All systems reviewed positive history as mentioned above.    PAST MEDICAL & SURGICAL HISTORY:  PVD (peripheral vascular disease)  Skin ulcer of left ankle, limited to breakdown of skin  Leg swelling  HTN (hypertension)  History of hip replacement    Allergies  vancomycin (Flushing)    MEDICATIONS  (STANDING):  aspirin enteric coated 81 milliGRAM(s) Oral daily  collagenase Ointment 1 Application(s) Topical daily  docusate sodium 100 milliGRAM(s) Oral daily  enoxaparin Injectable 40 milliGRAM(s) SubCutaneous daily  gabapentin 300 milliGRAM(s) Oral at bedtime  meropenem  IVPB 500 milliGRAM(s) IV Intermittent every 8 hours  pantoprazole    Tablet 40 milliGRAM(s) Oral before breakfast  senna 2 Tablet(s) Oral at bedtime  simvastatin 20 milliGRAM(s) Oral at bedtime    MEDICATIONS  (PRN):  guaiFENesin   Syrup  (Sugar-Free) 200 milliGRAM(s) Oral every 6 hours PRN Cough    T(C): 36.4 (07-16-19 @ 04:55), Max: 37.1 (07-15-19 @ 14:45)  HR: 71 (07-16-19 @ 04:55) (71 - 80)  BP: 136/63 (07-16-19 @ 04:55) (133/61 - 138/64)  RR: 18 (07-16-19 @ 04:55) (18 - 18)  SpO2: --    O/E:  Awake, alert, not in distress.  HEENT: atraumatic, EOMI.  Chest: clear.  CVS: SIS2 +, no murmur.  P/A: Soft, BS+  CNS: non focal.  Ext: B/l lower ext ulcers+, dressing  All systems reviewed positive findings as above.                          8.7<L>  4.68<L> )-----------( 419<H>    ( 16 Jul 2019 06:31 )             28.5<L>                        8.5<L>  4.73<L> )-----------( 483<H>    ( 15 Jul 2019 07:40 )             27.9<L>  07-16    138  |  103  |  8<L>  ----------------------------<  111<H>  4.6   |  28  |  0.5<L>  07-15    138  |  103  |  9<L>  ----------------------------<  101<H>  4.9   |  26  |  0.6<L>    Ca    8.1<L>      16 Jul 2019 06:31  Ca    8.3<L>      15 Jul 2019 07:40  Mg     2.0     07-16

## 2019-07-16 NOTE — DISCHARGE NOTE PROVIDER - NSDCCPCAREPLAN_GEN_ALL_CORE_FT
PRINCIPAL DISCHARGE DIAGNOSIS  Diagnosis: Sepsis due to gram-negative UTI  Assessment and Plan of Treatment: Pt was admitted with diagnosis of possible sepsis likely secondary to Infection in the kidney.  Urine culture at Nursing home was positive for ESBL.  Pt was started on iv meropenem and she clinically improved, she is now being discharged on po Bactrim for one more day.  Pt advised to take her medications as prescribed.  Stay hydrated.  Maintain good hand and personal hygiene.  If develop fever, back pain, burning with urination, abdominal pain, vist PMD or come to ED        SECONDARY DISCHARGE DIAGNOSES  Diagnosis: Venous ulcer of lower leg without varicose veins  Assessment and Plan of Treatment: Pt has chronic left lower extremity venous ulcer.  Burn team changd the dressing and recommended negative pressure wound therapy at Nursing home    Diagnosis: Hypertension  Assessment and Plan of Treatment: Pt's home medication Valsartan was held due to Low BP initially.   she did not get any anti hypertension medicatiosn during the hospital course.  Valsaratn is being started, advised to hold it if systolic BP is less than 110 mmhg.  Take low salt low cholesterol diet.  Follow up with PMD in 1 week.  Measure BP daily at home and take the readings to PMD in case BP medications need to held or some other needs to be added. PRINCIPAL DISCHARGE DIAGNOSIS  Diagnosis: Sepsis due to gram-negative UTI  Assessment and Plan of Treatment: Pt was admitted with diagnosis of possible sepsis likely secondary to Infection in the kidney.  Urine culture at Nursing home was positive for ESBL.  Pt was started on iv meropenem and she clinically improved, she is now being discharged on po Bactrim for one more day.  Pt advised to take her medications as prescribed.  Stay hydrated.  Maintain good hand and personal hygiene.  If develop fever, back pain, burning with urination, abdominal pain, vist PMD or come to ED        SECONDARY DISCHARGE DIAGNOSES  Diagnosis: Venous ulcer of lower leg without varicose veins  Assessment and Plan of Treatment: Pt has chronic left lower extremity venous ulcer.  Burn team changd the dressing and recommended negative pressure wound therapy at Nursing home.  pt advised to follow up with Dr Narinder Lr Burn team as outpatient.    Diagnosis: Hypertension  Assessment and Plan of Treatment: Pt's home medication Valsartan was held due to Low BP initially.   she did not get any anti hypertension medicatiosn during the hospital course.  Valsaratn is being started, advised to hold it if systolic BP is less than 110 mmhg.  Take low salt low cholesterol diet.  Follow up with PMD in 1 week.  Measure BP daily at home and take the readings to PMD in case BP medications need to held or some other needs to be added.

## 2019-07-16 NOTE — DISCHARGE NOTE PROVIDER - CARE PROVIDER_API CALL
Elder Farfan  Phone: [phone]  Fax: (   )    -  Follow Up Time: 1 week    Adeline Barcenas)  Plastic Surgery  [address] [place] [address]  Phone: [phone]  Fax: [phone]  Follow Up Time: 1 week Adeline Barcenas)  Plastic Surgery  [address] [address]  Phone: [phone]  Fax: [phone]  Follow Up Time: 1 week    Elder Farfan  Phone: [phone]  Fax: (   )    -  Follow Up Time: 1 week    Manuelito Jacques)  Internal Medicine  [address] [place] [address]  Phone: [phone]  Fax: [phone]  Follow Up Time: 1 week

## 2019-07-16 NOTE — DISCHARGE NOTE PROVIDER - CARE PROVIDERS DIRECT ADDRESSES
,DirectAddress_Unknown,[email or web address] ,[email or web address],DirectAddress_Unknown,[email or web address]

## 2019-07-16 NOTE — DISCHARGE NOTE PROVIDER - NSDCFUADDINST_GEN_ALL_CORE_FT
Please follow up with Dr Adolph Friedman at [facility] within 1 week of discharge Please follow up with Dr Adolph Friedman at [facility] within 1 week of discharge  F/u with Burns- Pt may benefit from NPWT when she returns to SNF   - Wound care - Santyl/dakins wtd/kerlix/ace bid Please follow up with Dr Adolph Friedman at [facility] within 1 week of discharge  F/u with Burn team Dr Narinder Lr- Pt may benefit from NPWT when she returns to SNF   - Wound care - Santyl/dakins wtd/kerlix/ace bid Please follow up with Dr Adolph Friedman at [facility] within 1 week of discharge.    F/u with Burn team Dr Narinder Lr- Pt may benefit from NPWT when she returns to SNF   - Wound care - Santyl/dakins wtd/kerlix/ace bid    Follow up with Dr Jacques PMD in 1 week.

## 2019-07-16 NOTE — DISCHARGE NOTE PROVIDER - PROVIDER TOKENS
FREE:[LAST:[Adolph],FIRST:[Elder],PHONE:[phone]],FAX:[(   )    -],FOLLOWUP:[1 week]],PROVIDER:[TOKEN:[[id number]],FOLLOWUP:[1 week]] PROVIDER:[TOKEN:[[id number]],FOLLOWUP:[1 week]],FREE:[LAST:[Adolph],FIRST:[Elder],PHONE:[phone]],FAX:[(   )    -],FOLLOWUP:[1 week]],PROVIDER:[TOKEN:[[id number]],FOLLOWUP:[1 week]]

## 2019-07-16 NOTE — DISCHARGE NOTE PROVIDER - HOSPITAL COURSE
84 yo F wth PVD, HTN, chronic stasis ulcer, multiple debridement by Burn p/w 1 day hx of hypotension from NH. Hx was obtained from aide at bedside. Pt has been lethargic for the past 3 days w/ reduced po intake. Pt had a tmax of 103F at NH. They checked BP and was in low 70s so was sent to ED. Pt also was complaining of vague abdominal pain but no dysuria or frequency or urgency.         Pt was admitted with diagnosis of possible sepsis likely secondary to pneumonia.    She initially had low BP to 90/54 in ED with Tmax 96.6. She received 2 L NS and 3.5 L LR in the ED but she remained hypotensive to 82/49. She ws then started at low dose Levophed and upgraded to ICU.     Chest xray initially revealed left basilar opacity and left sided pleural effusion. Pt was started on iv meropenem and she clinically imrpoved. Levophed was stopped the next day and pt was started on midodrine but she did not require it so it was stopped as well. She remained hemodynamically stable later on and was transferred to the regular floor.    Urinalysis, blood cultures were negative. venous duplex lower extremity was negative for any acute DVT. Arterial duplex showed diffuse mild-moderate lower extremity atherosclerotic disease.    Pt is now being switched to oral bactrim for 1 more day to complete 7 days course of antibiotics.            CT abdomen with iv contrast revealed multistation lymphadenopathy. 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 We called Dr Adolph Friedman at [phone], but could not get the details, left the message with the PA. 86 yo F wth PVD, HTN, chronic stasis ulcer, multiple debridement by Burn p/w 1 day hx of hypotension from NH. Hx was obtained from aide at bedside. Pt has been lethargic for the past 3 days w/ reduced po intake. Pt had a tmax of 103F at NH. They checked BP and was in low 70s so was sent to ED. Pt also was complaining of vague abdominal pain but no dysuria or frequency or urgency.         Pt was admitted with diagnosis of possible sepsis likely secondary to Infection in the kidney.    She initially had low BP to 90/54 in ED with Tmax 96.6. She received 2 L NS and 3.5 L LR in the ED but she remained hypotensive to 82/49. She ws then started at low dose Levophed and upgraded to ICU.     Urine culture at Nursing home was positive for ESBL.    Pt was started on iv meropenem and she clinically imrpoved. Levophed was stopped the next day and pt was started on midodrine but she did not require it so it was stopped as well. She remained hemodynamically stable later on and was transferred to the regular floor.    Urinalysis, blood cultures were negative here in the hospital but Pt had already received antibiotics at the hospital. venous duplex lower extremity was negative for any acute DVT. Arterial duplex showed diffuse mild-moderate lower extremity atherosclerotic disease.            Pt's home medication Valsartan was held due to Low BP initially.     she did not get any anti hypertension medicatiosn during the hospital course.            Left leg wound was seen by Burn team, dressing was changed. Burn team recommended Non pressure wound therapy at Nursing home.             CT abdomen with iv contrast revealed multistation lymphadenopathy. 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 We called Dr Adolph Friedman at [phone], but could not get the details, left the message with the PA. 86 yo F wt PVD, HTN, chronic stasis ulcer, multiple debridement by Burn p/w 1 day hx of hypotension from NH. Hx was obtained from aide at bedside. Pt has been lethargic for the past 3 days w/ reduced po intake. Pt had a tmax of 103F at NH. They checked BP and was in low 70s so was sent to ED. Pt also was complaining of vague abdominal pain but no dysuria or frequency or urgency.         Pt was admitted with diagnosis of possible sepsis likely secondary to Infection in the kidney.    She initially had low BP to 90/54 in ED with Tmax 96.6. She received 2 L NS and 3.5 L LR in the ED but she remained hypotensive to 82/49. She ws then started at low dose Levophed and upgraded to ICU.     Urine culture at Nursing home was positive for ESBL.    Pt was started on iv meropenem and she clinically imrpoved. Levophed was stopped the next day and pt was started on midodrine but she did not require it so it was stopped as well. She remained hemodynamically stable later on and was transferred to the regular floor.    Urinalysis, blood cultures were negative here in the hospital but Pt had already received antibiotics at the hospital. venous duplex lower extremity was negative for any acute DVT. Arterial duplex showed diffuse mild-moderate lower extremity atherosclerotic disease.            Pt's home medication Valsartan was held due to Low BP initially.     she did not get any anti hypertension medication during the hospital course.            Left leg wound was seen by Burn team, dressing was changed. Burn team recommended Non pressure wound therapy at Nursing home.             CT abdomen with iv contrast revealed multistation lymphadenopathy. 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 We called Dr Adolph Friedman at [phone], but could not get the details, left the message with the PA.         # Septic shock probably sec to pyelonephritis-resolved    -ESBL ecoli -Urine culture at NH +ve 50k ESBL Ecoli     - Blood cultures, urine cultures -neg    - Xray Chest 1 View- PORTABLE-Routine (07.13.19 @ 05:39) >Unchanged enlarged cardiomediastinal silhouette for which mass/lymphadenopathy is not excluded. Unchanged small left pleural effusion.    - CT Abdomen and Pelvis w/ IV Cont (07.11.19 @ 05:02) > interval enlargement in multistation calcified lymphadenopathy as described above. New mild left-sided hydroureteronephrosis likely secondary to encasement by enlarged retroperitoneal lymph nodes.Small/trace bilateral pleural effusions.Cholelithiasis.    - C/w meropenem , Switch to PO Bactrim at discharge        # Cough    - c/w guaifenisin        # H/o kaposi Sarcoma of Skin    -on active chemotherapy by Dr Adolph Friedman at [facility] ([phone])    - Discussed with Dr Adolph Friedman, recommends outpt F/u with him        #H/o PVD,  B/l lower ext chronic stasis ulcer    - [facility] Duplex Lower Ext Vein Scan, Bilat (07.11.19 @ 08:23) >No evidence of deep venous thrombosis or superficial thrombophlebitis in bilateral lower extremities.    - VA Duplex Lower Extrem Arterial, Bilat (07.11.19 @ 08:21) >Diffuse mild to moderate arterial atherosclerotic disease bilateral lower extremities.No evidence of high-grade stenosis or arterial occlusion in bilateral lower extremities.    - c/w ASA, statin    - Burns eval- Pt may benefit from NPWT when she returns to SNF     - Wound care - Santyl/dakins wtd/kerlix/ace bid        # Hypertension    - restart  valsartan, hold for SBP <110 86 yo F [facility] PVD, HTN, chronic stasis ulcer, multiple debridement by Burn p/w 1 day hx of hypotension from NH. Hx was obtained from aide at bedside. Pt has been lethargic for the past 3 days w/ reduced po intake. Pt had a tmax of 103F at NH. They checked BP and was in low 70s so was sent to ED. Pt also was complaining of vague abdominal pain but no dysuria or frequency or urgency.         Pt was admitted with diagnosis of possible sepsis likely secondary to Infection in the kidney.    She initially had low BP to 90/54 in ED with Tmax 96.6. She received 2 L NS and 3.5 L LR in the ED but she remained hypotensive to 82/49. She ws then started at low dose Levophed and upgraded to ICU.     Urine culture at Nursing home was positive for ESBL.    Pt was started on iv meropenem and she clinically imrpoved. Levophed was stopped the next day and pt was started on midodrine but she did not require it so it was stopped as well. She remained hemodynamically stable later on and was transferred to the regular floor.    Urinalysis, blood cultures were negative here in the hospital but Pt had already received antibiotics at the hospital. venous duplex lower extremity was negative for any acute DVT. Arterial duplex showed diffuse mild-moderate lower extremity atherosclerotic disease.            Pt's home medication Valsartan was held due to Low BP initially.     she did not get any anti hypertension medication during the hospital course.            Left leg wound was seen by Burn team, dressing was changed. Burn team recommended Non pressure wound therapy at Nursing home.             CT abdomen with iv contrast revealed multistation lymphadenopathy. Pt is already following at [facility] for kaposi sarcoma of skin at [facility] with Dr Adolph Friedman at [phone]/2001, He called back and said she has Kaposi sarcoma diagnosed for past 4 years and has been on Liposomal Doxorubicin monthly. Pt advised to follow up with him 1 week.        # Septic shock probably sec to pyelonephritis-resolved    -ESBL ecoli -Urine culture at NH +ve 50k ESBL Ecoli     - Blood cultures, urine cultures -neg    - Xray Chest 1 View- PORTABLE-Routine (07.13.19 @ 05:39) >Unchanged enlarged cardiomediastinal silhouette for which mass/lymphadenopathy is not excluded. Unchanged small left pleural effusion.    - CT Abdomen and Pelvis w/ IV Cont (07.11.19 @ 05:02) > interval enlargement in multistation calcified lymphadenopathy as described above. New mild left-sided hydroureteronephrosis likely secondary to encasement by enlarged retroperitoneal lymph nodes.Small/trace bilateral pleural effusions.Cholelithiasis.    - C/w meropenem , Switch to PO Bactrim at discharge        # Cough    - c/w guaifenisin        # H/o kaposi Sarcoma of Skin    -on active chemotherapy by Dr Adolph Friedman at [facility] ([phone])    - Discussed with Dr Adolph Friedman, recommends outpt F/u with him        #H/o PVD,  B/l lower ext chronic stasis ulcer    - VA Duplex Lower Ext Vein Scan, Bilat (07.11.19 @ 08:23) >No evidence of deep venous thrombosis or superficial thrombophlebitis in bilateral lower extremities.    - VA Duplex Lower Extrem Arterial, Bilat (07.11.19 @ 08:21) >Diffuse mild to moderate arterial atherosclerotic disease bilateral lower extremities.No evidence of high-grade stenosis or arterial occlusion in bilateral lower extremities.    - c/w ASA, statin    - Burns eval- Pt may benefit from NPWT when she returns to SNF     - Wound care - Santyl/dakins wtd/kerlix/ace bid        # Hypertension    - restart  valsartan, hold for SBP <110

## 2019-07-17 ENCOUNTER — OUTPATIENT (OUTPATIENT)
Dept: OUTPATIENT SERVICES | Facility: HOSPITAL | Age: 84
LOS: 1 days | Discharge: HOME | End: 2019-07-17

## 2019-07-17 DIAGNOSIS — R79.9 ABNORMAL FINDING OF BLOOD CHEMISTRY, UNSPECIFIED: ICD-10-CM

## 2019-07-17 DIAGNOSIS — Z96.649 PRESENCE OF UNSPECIFIED ARTIFICIAL HIP JOINT: Chronic | ICD-10-CM

## 2019-07-17 DIAGNOSIS — N39.0 URINARY TRACT INFECTION, SITE NOT SPECIFIED: ICD-10-CM

## 2019-07-22 DIAGNOSIS — E87.2 ACIDOSIS: ICD-10-CM

## 2019-07-22 DIAGNOSIS — I95.9 HYPOTENSION, UNSPECIFIED: ICD-10-CM

## 2019-07-22 DIAGNOSIS — Z92.21 PERSONAL HISTORY OF ANTINEOPLASTIC CHEMOTHERAPY: ICD-10-CM

## 2019-07-22 DIAGNOSIS — Z79.82 LONG TERM (CURRENT) USE OF ASPIRIN: ICD-10-CM

## 2019-07-22 DIAGNOSIS — R10.9 UNSPECIFIED ABDOMINAL PAIN: ICD-10-CM

## 2019-07-22 DIAGNOSIS — I10 ESSENTIAL (PRIMARY) HYPERTENSION: ICD-10-CM

## 2019-07-22 DIAGNOSIS — N12 TUBULO-INTERSTITIAL NEPHRITIS, NOT SPECIFIED AS ACUTE OR CHRONIC: ICD-10-CM

## 2019-07-22 DIAGNOSIS — B96.20 UNSPECIFIED ESCHERICHIA COLI [E. COLI] AS THE CAUSE OF DISEASES CLASSIFIED ELSEWHERE: ICD-10-CM

## 2019-07-22 DIAGNOSIS — I87.8 OTHER SPECIFIED DISORDERS OF VEINS: ICD-10-CM

## 2019-07-22 DIAGNOSIS — I70.209 UNSPECIFIED ATHEROSCLEROSIS OF NATIVE ARTERIES OF EXTREMITIES, UNSPECIFIED EXTREMITY: ICD-10-CM

## 2019-07-22 DIAGNOSIS — R65.21 SEVERE SEPSIS WITH SEPTIC SHOCK: ICD-10-CM

## 2019-07-22 DIAGNOSIS — R53.81 OTHER MALAISE: ICD-10-CM

## 2019-07-22 DIAGNOSIS — Z96.649 PRESENCE OF UNSPECIFIED ARTIFICIAL HIP JOINT: ICD-10-CM

## 2019-07-22 DIAGNOSIS — Z88.8 ALLERGY STATUS TO OTHER DRUGS, MEDICAMENTS AND BIOLOGICAL SUBSTANCES: ICD-10-CM

## 2019-07-22 DIAGNOSIS — K80.20 CALCULUS OF GALLBLADDER WITHOUT CHOLECYSTITIS WITHOUT OBSTRUCTION: ICD-10-CM

## 2019-07-22 DIAGNOSIS — R05 COUGH: ICD-10-CM

## 2019-07-22 DIAGNOSIS — D64.9 ANEMIA, UNSPECIFIED: ICD-10-CM

## 2019-07-22 DIAGNOSIS — D72.819 DECREASED WHITE BLOOD CELL COUNT, UNSPECIFIED: ICD-10-CM

## 2019-07-22 DIAGNOSIS — A41.9 SEPSIS, UNSPECIFIED ORGANISM: ICD-10-CM

## 2019-07-22 DIAGNOSIS — C46.1 KAPOSI'S SARCOMA OF SOFT TISSUE: ICD-10-CM

## 2019-07-22 DIAGNOSIS — I44.0 ATRIOVENTRICULAR BLOCK, FIRST DEGREE: ICD-10-CM

## 2019-07-22 DIAGNOSIS — E87.5 HYPERKALEMIA: ICD-10-CM

## 2019-07-22 DIAGNOSIS — I73.9 PERIPHERAL VASCULAR DISEASE, UNSPECIFIED: ICD-10-CM

## 2019-07-22 DIAGNOSIS — R33.9 RETENTION OF URINE, UNSPECIFIED: ICD-10-CM

## 2019-07-22 DIAGNOSIS — L98.419 NON-PRESSURE CHRONIC ULCER OF BUTTOCK WITH UNSPECIFIED SEVERITY: ICD-10-CM

## 2019-07-22 DIAGNOSIS — L97.909 NON-PRESSURE CHRONIC ULCER OF UNSPECIFIED PART OF UNSPECIFIED LOWER LEG WITH UNSPECIFIED SEVERITY: ICD-10-CM

## 2019-07-22 DIAGNOSIS — R59.9 ENLARGED LYMPH NODES, UNSPECIFIED: ICD-10-CM

## 2019-07-22 DIAGNOSIS — N13.6 PYONEPHROSIS: ICD-10-CM

## 2019-07-22 DIAGNOSIS — J90 PLEURAL EFFUSION, NOT ELSEWHERE CLASSIFIED: ICD-10-CM

## 2019-07-25 ENCOUNTER — APPOINTMENT (OUTPATIENT)
Dept: BURN CARE | Facility: CLINIC | Age: 84
End: 2019-07-25

## 2019-08-13 ENCOUNTER — OUTPATIENT (OUTPATIENT)
Dept: OUTPATIENT SERVICES | Facility: HOSPITAL | Age: 84
LOS: 1 days | Discharge: HOME | End: 2019-08-13

## 2019-08-13 DIAGNOSIS — Z96.649 PRESENCE OF UNSPECIFIED ARTIFICIAL HIP JOINT: Chronic | ICD-10-CM

## 2019-08-13 DIAGNOSIS — R79.9 ABNORMAL FINDING OF BLOOD CHEMISTRY, UNSPECIFIED: ICD-10-CM

## 2019-08-20 ENCOUNTER — APPOINTMENT (OUTPATIENT)
Dept: BURN CARE | Facility: CLINIC | Age: 84
End: 2019-08-20

## 2021-01-25 NOTE — DISCHARGE NOTE NURSING/CASE MANAGEMENT/SOCIAL WORK - NSTRANSFEREYEGLASSESPAIRS_GEN_A_NUR
Refill requested for:     Montelukast 10mg    Last filled on:     3/19/2020 #90 1 refill  Last office visit:     11/23/2020  Pending office visit 11/4/2021    Medication refilled per protocol.   1 pair

## 2021-03-16 NOTE — PATIENT PROFILE ADULT - NSPROGENANESREACTION_GEN_A_NUR
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

## 2021-09-27 NOTE — PRE-ANESTHESIA EVALUATION ADULT - BMI (KG/M2)
Attended afternoon meet and greet. Updated on staffing and daily expectations. Participated in Q and A regarding floor and flow of floor. 32.1

## 2021-09-29 NOTE — PATIENT PROFILE ADULT - NSPROREFERSVCHOMECARDIO_GEN_A_NUR
Initiate Treatment: Aczone Detail Level: Zone Render In Strict Bullet Format?: No Continue Regimen: Clindamycin lotion Modify Regimen: Increase Tretinoin to .05% no

## 2022-10-28 NOTE — DISCHARGE NOTE NURSING/CASE MANAGEMENT/SOCIAL WORK - NSDCPECAREEDUMAT _GEN_ALL_CORE
Patient has been having dizzy spells she has taken off work today. Has been checking blood pressure and has been normal range. Blood Sugars was 89 this morning but already 246 after checking again. She states that these high blood sugars happen due to stress from job, that it gets to 300. I informed her that can cause dizziness, as well as her stress or many other things, but she is concerned it is the pristiq. Asked if she wanted appointment and she stated she has an upcoming one 11-16-22   Diabetes

## 2023-02-07 NOTE — PROGRESS NOTE ADULT - NEUROLOGICAL
Alert & oriented; no sensory, motor or coordination deficits, normal reflexes Cibinqo Pregnancy And Lactation Text: It is unknown if this medication will adversely affect pregnancy or breast feeding.  You should not take this medication if you are currently pregnant or planning a pregnancy or while breastfeeding.

## 2023-02-15 NOTE — PATIENT PROFILE ADULT - BRADEN ACTIVITY
Addended by: HANNA BORJA on: 2/15/2023 12:39 PM     Modules accepted: Orders     (3) walks occasionally

## 2023-03-13 NOTE — PRE-OP CHECKLIST - TEMPERATURE IN CELSIUS (DEGREES C)
Rx Refill Note  Requested Prescriptions     Pending Prescriptions Disp Refills   • traMADol (ULTRAM) 50 MG tablet 180 tablet 0     Sig: Take 1 tablet by mouth 2 (Two) Times a Day.      Last office visit with prescribing clinician: 9/15/2022   Last telemedicine visit with prescribing clinician: 3/28/2023   Next office visit with prescribing clinician: 3/28/2023          ToxASSURE Select 13 (MW) - Urine, Clean Catch (01/05/2021 11:29)                   Would you like a call back once the refill request has been completed: [] Yes [] No    If the office needs to give you a call back, can they leave a voicemail: [] Yes [] No    Sharon Torres MA  03/13/23, 09:16 EDT  
35.8
